# Patient Record
Sex: FEMALE | Race: WHITE | HISPANIC OR LATINO | Employment: UNEMPLOYED | URBAN - METROPOLITAN AREA
[De-identification: names, ages, dates, MRNs, and addresses within clinical notes are randomized per-mention and may not be internally consistent; named-entity substitution may affect disease eponyms.]

---

## 2017-02-28 ENCOUNTER — TRANSCRIBE ORDERS (OUTPATIENT)
Dept: LAB | Facility: HOSPITAL | Age: 52
End: 2017-02-28

## 2017-02-28 ENCOUNTER — APPOINTMENT (OUTPATIENT)
Dept: LAB | Facility: HOSPITAL | Age: 52
End: 2017-02-28
Attending: FAMILY MEDICINE

## 2017-02-28 DIAGNOSIS — R94.6 NONSPECIFIC ABNORMAL RESULTS OF THYROID FUNCTION STUDY: ICD-10-CM

## 2017-02-28 DIAGNOSIS — K59.00 UNSPECIFIED CONSTIPATION: ICD-10-CM

## 2017-02-28 DIAGNOSIS — R94.6 NONSPECIFIC ABNORMAL RESULTS OF THYROID FUNCTION STUDY: Primary | ICD-10-CM

## 2017-02-28 LAB
T4 FREE SERPL-MCNC: 0.98 NG/DL (ref 0.76–1.46)
TSH SERPL DL<=0.05 MIU/L-ACNC: 5.27 UIU/ML (ref 0.36–3.74)

## 2017-02-28 PROCEDURE — 84439 ASSAY OF FREE THYROXINE: CPT

## 2017-02-28 PROCEDURE — 36415 COLL VENOUS BLD VENIPUNCTURE: CPT

## 2017-02-28 PROCEDURE — 84443 ASSAY THYROID STIM HORMONE: CPT

## 2017-04-18 ENCOUNTER — ALLSCRIPTS OFFICE VISIT (OUTPATIENT)
Dept: OTHER | Facility: OTHER | Age: 52
End: 2017-04-18

## 2017-04-18 DIAGNOSIS — Z12.11 ENCOUNTER FOR SCREENING FOR MALIGNANT NEOPLASM OF COLON: ICD-10-CM

## 2017-04-19 ENCOUNTER — APPOINTMENT (OUTPATIENT)
Dept: LAB | Facility: CLINIC | Age: 52
End: 2017-04-19

## 2017-04-19 DIAGNOSIS — Z12.11 ENCOUNTER FOR SCREENING FOR MALIGNANT NEOPLASM OF COLON: ICD-10-CM

## 2017-04-19 LAB — HEMOCCULT STL QL IA: NEGATIVE

## 2017-04-19 PROCEDURE — G0328 FECAL BLOOD SCRN IMMUNOASSAY: HCPCS

## 2018-01-14 VITALS
HEIGHT: 61 IN | SYSTOLIC BLOOD PRESSURE: 96 MMHG | WEIGHT: 149.47 LBS | TEMPERATURE: 98.2 F | HEART RATE: 76 BPM | BODY MASS INDEX: 28.22 KG/M2 | DIASTOLIC BLOOD PRESSURE: 70 MMHG

## 2021-10-19 ENCOUNTER — OFFICE VISIT (OUTPATIENT)
Dept: OBGYN CLINIC | Facility: CLINIC | Age: 56
End: 2021-10-19

## 2021-10-19 VITALS
SYSTOLIC BLOOD PRESSURE: 115 MMHG | BODY MASS INDEX: 29.77 KG/M2 | HEART RATE: 66 BPM | DIASTOLIC BLOOD PRESSURE: 73 MMHG | WEIGHT: 155 LBS

## 2021-10-19 DIAGNOSIS — Z72.51 HIGH RISK HETEROSEXUAL BEHAVIOR: ICD-10-CM

## 2021-10-19 DIAGNOSIS — Z01.419 WOMEN'S ANNUAL ROUTINE GYNECOLOGICAL EXAMINATION: Primary | ICD-10-CM

## 2021-10-19 DIAGNOSIS — Z12.4 SCREENING FOR CERVICAL CANCER: ICD-10-CM

## 2021-10-19 DIAGNOSIS — Z11.3 SCREENING FOR STDS (SEXUALLY TRANSMITTED DISEASES): ICD-10-CM

## 2021-10-19 PROCEDURE — G0145 SCR C/V CYTO,THINLAYER,RESCR: HCPCS | Performed by: OBSTETRICS & GYNECOLOGY

## 2021-10-19 PROCEDURE — 87591 N.GONORRHOEAE DNA AMP PROB: CPT | Performed by: OBSTETRICS & GYNECOLOGY

## 2021-10-19 PROCEDURE — 87491 CHLMYD TRACH DNA AMP PROBE: CPT | Performed by: OBSTETRICS & GYNECOLOGY

## 2021-10-19 PROCEDURE — 99386 PREV VISIT NEW AGE 40-64: CPT | Performed by: OBSTETRICS & GYNECOLOGY

## 2021-10-19 PROCEDURE — G0476 HPV COMBO ASSAY CA SCREEN: HCPCS | Performed by: OBSTETRICS & GYNECOLOGY

## 2021-10-19 RX ORDER — DIPHENOXYLATE HYDROCHLORIDE AND ATROPINE SULFATE 2.5; .025 MG/1; MG/1
1 TABLET ORAL DAILY
COMMUNITY

## 2021-10-20 ENCOUNTER — HOSPITAL ENCOUNTER (OUTPATIENT)
Dept: RADIOLOGY | Age: 56
Discharge: HOME | End: 2021-10-20

## 2021-10-20 ENCOUNTER — APPOINTMENT (OUTPATIENT)
Dept: LAB | Age: 56
End: 2021-10-20

## 2021-10-20 VITALS — HEIGHT: 60 IN | WEIGHT: 155 LBS | BODY MASS INDEX: 30.43 KG/M2

## 2021-10-20 DIAGNOSIS — Z01.419 WOMEN'S ANNUAL ROUTINE GYNECOLOGICAL EXAMINATION: ICD-10-CM

## 2021-10-20 DIAGNOSIS — Z72.51 HIGH RISK HETEROSEXUAL BEHAVIOR: ICD-10-CM

## 2021-10-20 DIAGNOSIS — Z12.31 ENCOUNTER FOR SCREENING MAMMOGRAM FOR MALIGNANT NEOPLASM OF BREAST: ICD-10-CM

## 2021-10-20 LAB
HBV SURFACE AG SER QL: NORMAL
HCV AB SER QL: NORMAL

## 2021-10-20 PROCEDURE — 77067 SCR MAMMO BI INCL CAD: CPT

## 2021-10-20 PROCEDURE — 87340 HEPATITIS B SURFACE AG IA: CPT

## 2021-10-20 PROCEDURE — 87389 HIV-1 AG W/HIV-1&-2 AB AG IA: CPT

## 2021-10-20 PROCEDURE — 36415 COLL VENOUS BLD VENIPUNCTURE: CPT

## 2021-10-20 PROCEDURE — 77063 BREAST TOMOSYNTHESIS BI: CPT

## 2021-10-20 PROCEDURE — 86592 SYPHILIS TEST NON-TREP QUAL: CPT

## 2021-10-20 PROCEDURE — 86803 HEPATITIS C AB TEST: CPT

## 2021-10-21 LAB
C TRACH DNA SPEC QL NAA+PROBE: NEGATIVE
HIV 1+2 AB+HIV1 P24 AG SERPL QL IA: NORMAL
HPV HR 12 DNA CVX QL NAA+PROBE: NEGATIVE
HPV16 DNA CVX QL NAA+PROBE: NEGATIVE
HPV18 DNA CVX QL NAA+PROBE: NEGATIVE
N GONORRHOEA DNA SPEC QL NAA+PROBE: NEGATIVE
RPR SER QL: NORMAL

## 2021-10-25 ENCOUNTER — PATIENT OUTREACH (OUTPATIENT)
Dept: OBGYN CLINIC | Facility: CLINIC | Age: 56
End: 2021-10-25

## 2021-10-26 ENCOUNTER — OFFICE VISIT (OUTPATIENT)
Dept: INTERNAL MEDICINE CLINIC | Facility: CLINIC | Age: 56
End: 2021-10-26

## 2021-10-26 VITALS
OXYGEN SATURATION: 97 % | TEMPERATURE: 97.9 F | HEART RATE: 66 BPM | WEIGHT: 155.8 LBS | HEIGHT: 61 IN | BODY MASS INDEX: 29.42 KG/M2 | SYSTOLIC BLOOD PRESSURE: 105 MMHG | DIASTOLIC BLOOD PRESSURE: 66 MMHG

## 2021-10-26 DIAGNOSIS — Z23 NEED FOR INFLUENZA VACCINATION: ICD-10-CM

## 2021-10-26 DIAGNOSIS — Z76.89 ESTABLISHING CARE WITH NEW DOCTOR, ENCOUNTER FOR: Primary | ICD-10-CM

## 2021-10-26 PROBLEM — Z00.00 HEALTHCARE MAINTENANCE: Status: ACTIVE | Noted: 2021-10-26

## 2021-10-26 LAB
LAB AP GYN PRIMARY INTERPRETATION: NORMAL
Lab: NORMAL
PATH INTERP SPEC-IMP: NORMAL

## 2021-10-26 PROCEDURE — 90471 IMMUNIZATION ADMIN: CPT | Performed by: INTERNAL MEDICINE

## 2021-10-26 PROCEDURE — 90682 RIV4 VACC RECOMBINANT DNA IM: CPT | Performed by: INTERNAL MEDICINE

## 2021-10-26 PROCEDURE — 99204 OFFICE O/P NEW MOD 45 MIN: CPT | Performed by: INTERNAL MEDICINE

## 2021-10-26 RX ORDER — GLUCOSAM/CHON-MSM1/C/MANG/BOSW 750-644 MG
TABLET ORAL
COMMUNITY

## 2021-10-26 RX ORDER — LOVASTATIN 20 MG/1
1 TABLET ORAL
COMMUNITY
Start: 2017-04-18 | End: 2022-04-22 | Stop reason: ALTCHOICE

## 2021-10-26 RX ORDER — CYCLOBENZAPRINE HCL 5 MG
1 TABLET ORAL
COMMUNITY
Start: 2017-04-18 | End: 2022-07-27 | Stop reason: ALTCHOICE

## 2021-10-26 RX ORDER — AMITRIPTYLINE HYDROCHLORIDE 25 MG/1
TABLET, FILM COATED ORAL
COMMUNITY
End: 2022-07-27 | Stop reason: ALTCHOICE

## 2021-10-26 RX ORDER — ACETAMINOPHEN 500 MG
500 TABLET ORAL EVERY 6 HOURS PRN
COMMUNITY

## 2021-10-26 RX ORDER — AMITRIPTYLINE HYDROCHLORIDE 50 MG/1
1 TABLET, FILM COATED ORAL
COMMUNITY
Start: 2017-04-18 | End: 2022-07-27 | Stop reason: ALTCHOICE

## 2021-10-27 ENCOUNTER — APPOINTMENT (OUTPATIENT)
Dept: LAB | Facility: CLINIC | Age: 56
End: 2021-10-27

## 2021-10-27 ENCOUNTER — TELEPHONE (OUTPATIENT)
Dept: OBGYN CLINIC | Facility: CLINIC | Age: 56
End: 2021-10-27

## 2021-10-27 DIAGNOSIS — Z76.89 ESTABLISHING CARE WITH NEW DOCTOR, ENCOUNTER FOR: ICD-10-CM

## 2021-10-27 LAB
ALBUMIN SERPL BCP-MCNC: 3.6 G/DL (ref 3.5–5)
ALP SERPL-CCNC: 107 U/L (ref 46–116)
ALT SERPL W P-5'-P-CCNC: 22 U/L (ref 12–78)
ANION GAP SERPL CALCULATED.3IONS-SCNC: 4 MMOL/L (ref 4–13)
AST SERPL W P-5'-P-CCNC: 18 U/L (ref 5–45)
BILIRUB SERPL-MCNC: 1.15 MG/DL (ref 0.2–1)
BUN SERPL-MCNC: 11 MG/DL (ref 5–25)
CALCIUM SERPL-MCNC: 9.5 MG/DL (ref 8.3–10.1)
CHLORIDE SERPL-SCNC: 108 MMOL/L (ref 100–108)
CHOLEST SERPL-MCNC: 231 MG/DL (ref 50–200)
CO2 SERPL-SCNC: 27 MMOL/L (ref 21–32)
CREAT SERPL-MCNC: 0.6 MG/DL (ref 0.6–1.3)
EST. AVERAGE GLUCOSE BLD GHB EST-MCNC: 128 MG/DL
GFR SERPL CREATININE-BSD FRML MDRD: 102 ML/MIN/1.73SQ M
GLUCOSE P FAST SERPL-MCNC: 114 MG/DL (ref 65–99)
HBA1C MFR BLD: 6.1 %
HDLC SERPL-MCNC: 56 MG/DL
HEMOCCULT STL QL IA: NEGATIVE
LDLC SERPL CALC-MCNC: 150 MG/DL (ref 0–100)
POTASSIUM SERPL-SCNC: 3.5 MMOL/L (ref 3.5–5.3)
PROT SERPL-MCNC: 7.7 G/DL (ref 6.4–8.2)
SODIUM SERPL-SCNC: 139 MMOL/L (ref 136–145)
T4 FREE SERPL-MCNC: 1.01 NG/DL (ref 0.76–1.46)
TRIGL SERPL-MCNC: 124 MG/DL
TSH SERPL DL<=0.05 MIU/L-ACNC: 4.27 UIU/ML (ref 0.36–3.74)

## 2021-10-27 PROCEDURE — 36415 COLL VENOUS BLD VENIPUNCTURE: CPT

## 2021-10-27 PROCEDURE — 80053 COMPREHEN METABOLIC PANEL: CPT

## 2021-10-27 PROCEDURE — 83036 HEMOGLOBIN GLYCOSYLATED A1C: CPT

## 2021-10-27 PROCEDURE — G0328 FECAL BLOOD SCRN IMMUNOASSAY: HCPCS

## 2021-10-27 PROCEDURE — 84439 ASSAY OF FREE THYROXINE: CPT

## 2021-10-27 PROCEDURE — 84443 ASSAY THYROID STIM HORMONE: CPT

## 2021-10-27 PROCEDURE — 80061 LIPID PANEL: CPT

## 2021-11-08 DIAGNOSIS — E03.9 HYPOTHYROIDISM, UNSPECIFIED TYPE: Primary | ICD-10-CM

## 2021-11-08 RX ORDER — LEVOTHYROXINE SODIUM 0.05 MG/1
50 TABLET ORAL DAILY
Qty: 30 TABLET | Refills: 2 | Status: SHIPPED | OUTPATIENT
Start: 2021-11-08 | End: 2022-04-22 | Stop reason: SDUPTHER

## 2021-11-09 ENCOUNTER — OFFICE VISIT (OUTPATIENT)
Dept: OBGYN CLINIC | Facility: CLINIC | Age: 56
End: 2021-11-09

## 2021-11-09 VITALS
HEART RATE: 72 BPM | DIASTOLIC BLOOD PRESSURE: 75 MMHG | HEIGHT: 61 IN | WEIGHT: 124 LBS | BODY MASS INDEX: 23.41 KG/M2 | SYSTOLIC BLOOD PRESSURE: 108 MMHG

## 2021-11-09 DIAGNOSIS — N39.46 MIXED STRESS AND URGE URINARY INCONTINENCE: Primary | ICD-10-CM

## 2021-11-09 DIAGNOSIS — N95.2 VAGINAL ATROPHY: ICD-10-CM

## 2021-11-09 PROCEDURE — 99213 OFFICE O/P EST LOW 20 MIN: CPT | Performed by: OBSTETRICS & GYNECOLOGY

## 2021-11-09 RX ORDER — CONJUGATED ESTROGENS 0.62 MG/G
0.5 CREAM VAGINAL DAILY
Qty: 30 G | Refills: 2 | Status: SHIPPED | OUTPATIENT
Start: 2021-11-09

## 2021-11-10 ENCOUNTER — OFFICE VISIT (OUTPATIENT)
Dept: DENTISTRY | Facility: CLINIC | Age: 56
End: 2021-11-10

## 2021-11-10 VITALS — SYSTOLIC BLOOD PRESSURE: 116 MMHG | HEART RATE: 71 BPM | TEMPERATURE: 97.1 F | DIASTOLIC BLOOD PRESSURE: 77 MMHG

## 2021-11-10 DIAGNOSIS — K02.9 DENTAL CARIES INTO PULP: ICD-10-CM

## 2021-11-10 DIAGNOSIS — Z01.21 ENCOUNTER FOR DENTAL EXAMINATION AND CLEANING WITH ABNORMAL FINDINGS: Primary | ICD-10-CM

## 2021-11-10 PROCEDURE — D0140 LIMITED ORAL EVALUATION - PROBLEM FOCUSED: HCPCS | Performed by: DENTIST

## 2021-11-10 PROCEDURE — D0220 INTRAORAL - PERIAPICAL FIRST RADIOGRAPHIC IMAGE: HCPCS | Performed by: DENTIST

## 2021-11-10 PROCEDURE — D0230 INTRAORAL - PERIAPICAL EACH ADDITIONAL RADIOGRAPHIC IMAGE: HCPCS | Performed by: DENTIST

## 2021-11-30 ENCOUNTER — OFFICE VISIT (OUTPATIENT)
Dept: DENTISTRY | Facility: CLINIC | Age: 56
End: 2021-11-30

## 2021-11-30 VITALS — SYSTOLIC BLOOD PRESSURE: 113 MMHG | HEART RATE: 72 BPM | TEMPERATURE: 96.6 F | DIASTOLIC BLOOD PRESSURE: 68 MMHG

## 2021-11-30 DIAGNOSIS — K02.9 CARIES: Primary | ICD-10-CM

## 2021-11-30 DIAGNOSIS — Z01.20 VISIT FOR DENTAL EXAMINATION: ICD-10-CM

## 2021-11-30 PROCEDURE — D7210 EXTRACTION, ERUPTED TOOTH REQUIRING REMOVAL OF BONE AND/OR SECTIONING OF TOOTH, AND INCLUDING ELEVATION OF MUCOPERIOSTEAL FLAP IF INDICATED: HCPCS | Performed by: DENTIST

## 2021-11-30 PROCEDURE — D7140 EXTRACTION, ERUPTED TOOTH OR EXPOSED ROOT (ELEVATION AND/OR FORCEPS REMOVAL): HCPCS | Performed by: DENTIST

## 2021-12-04 ENCOUNTER — IMMUNIZATIONS (OUTPATIENT)
Dept: FAMILY MEDICINE CLINIC | Facility: HOSPITAL | Age: 56
End: 2021-12-04

## 2021-12-04 DIAGNOSIS — Z23 ENCOUNTER FOR IMMUNIZATION: Primary | ICD-10-CM

## 2021-12-04 PROCEDURE — 91300 COVID-19 PFIZER VACC 0.3 ML: CPT

## 2021-12-04 PROCEDURE — 0001A COVID-19 PFIZER VACC 0.3 ML: CPT

## 2022-01-25 ENCOUNTER — OFFICE VISIT (OUTPATIENT)
Dept: DENTISTRY | Facility: CLINIC | Age: 57
End: 2022-01-25

## 2022-01-25 VITALS — DIASTOLIC BLOOD PRESSURE: 77 MMHG | SYSTOLIC BLOOD PRESSURE: 118 MMHG | TEMPERATURE: 97.2 F | HEART RATE: 78 BPM

## 2022-01-25 DIAGNOSIS — Z01.20 VISIT FOR DENTAL EXAMINATION: Primary | ICD-10-CM

## 2022-01-25 DIAGNOSIS — K05.6 PERIODONTAL DISEASE: ICD-10-CM

## 2022-01-25 PROCEDURE — D0210 INTRAORAL - COMPLETE SERIES OF RADIOGRAPHIC IMAGES: HCPCS

## 2022-01-25 PROCEDURE — D0150 COMPREHENSIVE ORAL EVALUATION - NEW OR ESTABLISHED PATIENT: HCPCS

## 2022-01-25 NOTE — PROGRESS NOTES
Comprehensive Exam/FMX/ Perio chart  Reviewed hhx    CC: "I still have pain on the upper right where my tooth was pulled out " Pt is pointing to #3 area  FMX obtained    Perio charting completed  Perio findings: generalized bone loss present with 5-8mm pockets and bleeding with generalized recession  Mobility: #11      Dr Burnett Pi did exam    -Pt wears upper and lower partial dentures     -Pt has 8 remaining teeth in mouth  -Due to mobility on #11 which anchors the partial denture, it is recommended to have remaining upper 4 teeth extracted and have full upper denture made  Gave patient option on valplast partial denture for maxilla, but not ideal option as upper teeth have bone loss    -Pt also needs alveoplasty on #3 area at the time of extractions      -Recommended SRP on LL 1-3 teeth, LR 1-3 teeth to save lowers for now, may need a full denture in future, but no mobility is present on ginny teeth so she would like to try to save them for now  Wants lower partial denture smoothed/polished at a future appt as it rubs  Pt pays out of pocket for all tx, but is on the sliding fee program     Lana Zambrano RDH      NV: ext #5, 6, 10, 11 and alveoplasty 90 mins on OS day- 90 minutes minimum  Nv2: Will need SRP for LL, LR- 60 mins with hygiene     NV3: start with making full upper denture

## 2022-02-02 ENCOUNTER — OFFICE VISIT (OUTPATIENT)
Dept: DENTISTRY | Facility: CLINIC | Age: 57
End: 2022-02-02

## 2022-02-02 VITALS — SYSTOLIC BLOOD PRESSURE: 136 MMHG | HEART RATE: 98 BPM | DIASTOLIC BLOOD PRESSURE: 77 MMHG | TEMPERATURE: 97.8 F

## 2022-02-02 DIAGNOSIS — Z01.21 ENCOUNTER FOR DENTAL EXAMINATION AND CLEANING WITH ABNORMAL FINDINGS: Primary | ICD-10-CM

## 2022-02-02 PROCEDURE — D7140 EXTRACTION, ERUPTED TOOTH OR EXPOSED ROOT (ELEVATION AND/OR FORCEPS REMOVAL): HCPCS | Performed by: DENTIST

## 2022-02-02 PROCEDURE — D7321: HCPCS | Performed by: DENTIST

## 2022-02-02 RX ORDER — PENICILLIN V POTASSIUM 500 MG/1
500 TABLET ORAL EVERY 6 HOURS SCHEDULED
Qty: 28 TABLET | Refills: 0 | Status: SHIPPED | OUTPATIENT
Start: 2022-02-02 | End: 2022-02-09

## 2022-02-02 NOTE — PROGRESS NOTES
Oral Surgery    [facility] presents for Ext #5, 6, 10, 11 and alveoloplasty on site tooth# 14  [facility], patient denies any changes  Obtained a direct and personal consent  Risks and complications were explained  Pt agreed and consented  Consent scanned in doc center  Pre-Op BP WNL  Administered 4 carpule of 2 % Lidocaine w/ 1:100,000 epi via infiltration  ? Adequate anesthesia obtained, reflected gingiva, elevated, and extracted #5,610,11  Blade #15 was used to flap tooth #14 site  Periosteal elevator was used to expose the bone  Surgical bur 703 and round #8 bur was used to remove and smooth out the undercuts  Socket irrigated, and 4 0 chromic gut sutures placed       Upon dismissal, patient received POI, ice, gauze, and RX: penicillin     NV: follow up

## 2022-02-07 ENCOUNTER — OFFICE VISIT (OUTPATIENT)
Dept: DENTISTRY | Facility: CLINIC | Age: 57
End: 2022-02-07

## 2022-02-07 VITALS — TEMPERATURE: 97.1 F | SYSTOLIC BLOOD PRESSURE: 106 MMHG | DIASTOLIC BLOOD PRESSURE: 67 MMHG | HEART RATE: 77 BPM

## 2022-02-07 DIAGNOSIS — K05.6 PERIODONTAL DISEASE: Primary | ICD-10-CM

## 2022-02-07 PROCEDURE — D4342 PERIODONTAL SCALING AND ROOT PLANING - 1 TO 3 TEETH PER QUADRANT: HCPCS

## 2022-02-07 NOTE — PROGRESS NOTES
Reviewed hhx  ASA: II    Patient presents for SRP in quads :LR, LL    Anesthesia-  Topical gel benzocaine 20% was applied to tissue  1 Carp of Septocaine 4% with epi was given via short needle  Type of injection: ginny infitrations  Neg aspirations and no complications for all  Cavitron and hand instruments used  Bleeding:moderate to heavy  Supra/sub calculus was removed from tooth surfaces    OHI: reviewed with the patient the need to maintain proper oral hygiene regimen at home  Following scaling and root planing, the patient will continue on 3-4 month periodontal maintenance appointments if healing is sufficient  *Pt inquired about making a new lower partial denture, hygienist explained she must talk to the dentist about that  Pt understands  NV: perio re-eval 4-6 weeks  Pt scheduled 3/22/2022  NV2: Scheduled 5/31/22 with Dr Ford Hernandez to continue tx for FUD Severo Schneider, [facility]

## 2022-02-08 ENCOUNTER — OFFICE VISIT (OUTPATIENT)
Dept: OBGYN CLINIC | Facility: CLINIC | Age: 57
End: 2022-02-08

## 2022-02-08 VITALS
BODY MASS INDEX: 28.53 KG/M2 | HEART RATE: 70 BPM | WEIGHT: 151 LBS | RESPIRATION RATE: 16 BRPM | DIASTOLIC BLOOD PRESSURE: 70 MMHG | SYSTOLIC BLOOD PRESSURE: 136 MMHG

## 2022-02-08 DIAGNOSIS — N39.46 MIXED STRESS AND URGE URINARY INCONTINENCE: Primary | ICD-10-CM

## 2022-02-08 PROBLEM — R32 URINARY INCONTINENCE: Status: ACTIVE | Noted: 2022-02-08

## 2022-02-08 LAB
SL AMB  POCT GLUCOSE, UA: NEGATIVE
SL AMB LEUKOCYTE ESTERASE,UA: NEGATIVE
SL AMB POCT BILIRUBIN,UA: NEGATIVE
SL AMB POCT BLOOD,UA: NEGATIVE
SL AMB POCT CLARITY,UA: CLEAR
SL AMB POCT COLOR,UA: YELLOW
SL AMB POCT KETONES,UA: NEGATIVE
SL AMB POCT NITRITE,UA: NEGATIVE
SL AMB POCT PH,UA: 6.5
SL AMB POCT SPECIFIC GRAVITY,UA: 1
SL AMB POCT URINE PROTEIN: NEGATIVE
SL AMB POCT UROBILINOGEN: 0.2

## 2022-02-08 PROCEDURE — 99213 OFFICE O/P EST LOW 20 MIN: CPT | Performed by: OBSTETRICS & GYNECOLOGY

## 2022-02-08 PROCEDURE — 81002 URINALYSIS NONAUTO W/O SCOPE: CPT | Performed by: OBSTETRICS & GYNECOLOGY

## 2022-02-08 NOTE — PROGRESS NOTES
Follow Up Visit  Urinary Incontinence     Subjective:  Bakari Patel is a 61 y o   female who presents for follow up for mild, mixed stress and urge incontinence  Last seen by Dr Janae Garcia on 2021  Discussed conservative management with behavioral modifications (avoidance of triggers, timed voids, Kegel exercises, possible use of estrogen cream, etc )  Patient returns today for follow up  States her symptoms are much improved  She denies major episodes of incontinence throughout the day  Only occasionally with sneezing  States she is waling more, doing daily Kegel exercises, drinking more water, avoiding carbohydrates  She notes only a couple episodes of awaking at night to use the bathroom  She is happy with the improvement in her symptoms  Objective:  Vitals:    22 1322   BP: 136/70   Pulse: 70   Resp: 16       Physical Exam  Constitutional:       General: She is not in acute distress  Appearance: She is normal weight  She is not ill-appearing or diaphoretic  HENT:      Head: Normocephalic and atraumatic  Eyes:      Conjunctiva/sclera: Conjunctivae normal       Pupils: Pupils are equal, round, and reactive to light  Cardiovascular:      Rate and Rhythm: Normal rate  Pulmonary:      Effort: Pulmonary effort is normal  No respiratory distress  Genitourinary:     Comments: Deferred   Musculoskeletal:         General: Normal range of motion  Cervical back: Normal range of motion  Skin:     General: Skin is warm and dry  Neurological:      General: No focal deficit present  Mental Status: She is alert and oriented to person, place, and time  Mental status is at baseline  Psychiatric:         Mood and Affect: Mood normal          Behavior: Behavior normal          Thought Content:  Thought content normal          Assessment:  Bakari Patel is a 61 y o   female with mild, mixed urinary incontinence, symptoms much improved after behavioral modifications since last office visit  She would like to continue conservative management for now  She states she is happy with the improvement in symptoms       Plan:   - RTC 6 months for follow up, or sooner for worsening symptoms/concerns   - Continue current management   - Urine dip today negative     Carmen Terrazas MD   PGY-3, OBGYN  02/08/22

## 2022-03-10 ENCOUNTER — OFFICE VISIT (OUTPATIENT)
Dept: DENTISTRY | Facility: CLINIC | Age: 57
End: 2022-03-10

## 2022-03-10 VITALS — HEART RATE: 77 BPM | TEMPERATURE: 97.3 F | DIASTOLIC BLOOD PRESSURE: 73 MMHG | SYSTOLIC BLOOD PRESSURE: 117 MMHG

## 2022-03-10 DIAGNOSIS — K05.6 PERIODONTAL DISEASE: Primary | ICD-10-CM

## 2022-03-10 PROCEDURE — D0171 RE-EVALUATION - POST-OPERATIVE OFFICE VISIT: HCPCS

## 2022-03-10 NOTE — PROGRESS NOTES
Periodontal Re-evaluation  Reviewed hhx    Perio charting completed  Perio findings: ideal healing has occurred  Pockets are between 2-3 mm with localized bleeding and generalized recession and bone loss  Following recommendations:  Patient has ideal healing and should remain on 3-4 month periodontal maintenance appointments     -Pt asked to move up her appt from 5/31/22 for fabrication of dentures sooner because her daughter is graduating high school  Confirmed with Dr Selvin Gu the soonest she can come in would be beginning of April 2022 due to having extractions done beginning of February 2022 as long as she is comfortable seeing any resident      -Pt states she has an existing lower partial denture but would like a new one  Added partial denture to tx plan today  NV: fabrication of full upper denture and lower partial denture  NV2: Pt is to remain on 3 month periodontal maintenance appts with hygiene         Cecilia Toribio, [facility]

## 2022-04-04 ENCOUNTER — OFFICE VISIT (OUTPATIENT)
Dept: DENTISTRY | Facility: CLINIC | Age: 57
End: 2022-04-04

## 2022-04-04 VITALS — SYSTOLIC BLOOD PRESSURE: 110 MMHG | HEART RATE: 71 BPM | DIASTOLIC BLOOD PRESSURE: 68 MMHG | TEMPERATURE: 96.8 F

## 2022-04-04 DIAGNOSIS — Z01.21 ENCOUNTER FOR DENTAL EXAMINATION AND CLEANING WITH ABNORMAL FINDINGS: Primary | ICD-10-CM

## 2022-04-04 PROCEDURE — WIS5000 PRELIMINARY IMPRESSIONS: Performed by: DENTIST

## 2022-04-04 NOTE — PROGRESS NOTES
Removable    Pt presents for a denture visit and gave verbal consent for treatment:    Reviewed health history - Pt is ASA 2    Treatment consents signed: Yes    Perio: Non applicable    Pain Scale: 0    Caries Assessment: none    Oral Hygiene instruction reviewed    Annual evaluation of soft tissue recommended    Teeth 21, 22, 27 and 28 are present so routine prophy visits recommended to the pt  Explained denture making process to patient, including the necessary number of visits and timeframe to make denture  Reviewed denture expectations, adjustment period, and hygiene  Upper and lower Silgimix impressions made using stock trays  Sent it to the lab for fabricating of custom trays     NV: Border molding and final impression

## 2022-04-22 ENCOUNTER — OFFICE VISIT (OUTPATIENT)
Dept: INTERNAL MEDICINE CLINIC | Facility: CLINIC | Age: 57
End: 2022-04-22

## 2022-04-22 ENCOUNTER — APPOINTMENT (OUTPATIENT)
Dept: LAB | Facility: CLINIC | Age: 57
End: 2022-04-22

## 2022-04-22 VITALS
HEART RATE: 75 BPM | BODY MASS INDEX: 29.02 KG/M2 | OXYGEN SATURATION: 97 % | SYSTOLIC BLOOD PRESSURE: 97 MMHG | TEMPERATURE: 97.6 F | DIASTOLIC BLOOD PRESSURE: 64 MMHG | WEIGHT: 153.6 LBS

## 2022-04-22 DIAGNOSIS — E78.5 HYPERLIPIDEMIA, UNSPECIFIED HYPERLIPIDEMIA TYPE: ICD-10-CM

## 2022-04-22 DIAGNOSIS — E03.9 HYPOTHYROIDISM, UNSPECIFIED TYPE: Primary | ICD-10-CM

## 2022-04-22 DIAGNOSIS — M79.89 BILATERAL HAND SWELLING: ICD-10-CM

## 2022-04-22 PROBLEM — E03.8 SUBCLINICAL HYPOTHYROIDISM: Status: ACTIVE | Noted: 2022-04-22

## 2022-04-22 LAB
CRP SERPL QL: 6 MG/L
ERYTHROCYTE [SEDIMENTATION RATE] IN BLOOD: 33 MM/HOUR (ref 0–29)

## 2022-04-22 PROCEDURE — 86038 ANTINUCLEAR ANTIBODIES: CPT

## 2022-04-22 PROCEDURE — 99213 OFFICE O/P EST LOW 20 MIN: CPT | Performed by: INTERNAL MEDICINE

## 2022-04-22 PROCEDURE — 86200 CCP ANTIBODY: CPT

## 2022-04-22 PROCEDURE — 36415 COLL VENOUS BLD VENIPUNCTURE: CPT

## 2022-04-22 PROCEDURE — 85652 RBC SED RATE AUTOMATED: CPT

## 2022-04-22 PROCEDURE — 86430 RHEUMATOID FACTOR TEST QUAL: CPT

## 2022-04-22 PROCEDURE — 86140 C-REACTIVE PROTEIN: CPT

## 2022-04-22 RX ORDER — LEVOTHYROXINE SODIUM 0.05 MG/1
50 TABLET ORAL DAILY
Qty: 30 TABLET | Refills: 2 | Status: SHIPPED | OUTPATIENT
Start: 2022-04-22

## 2022-04-22 RX ORDER — ATORVASTATIN CALCIUM 10 MG/1
10 TABLET, FILM COATED ORAL DAILY
Qty: 30 TABLET | Refills: 2 | Status: SHIPPED | OUTPATIENT
Start: 2022-04-22

## 2022-04-22 NOTE — PATIENT INSTRUCTIONS
Please  your medications from the [address] at [facility]  Please get the blood work done as soon as you can  We will call you with the results

## 2022-04-22 NOTE — PROGRESS NOTES
[facility]  INTERNAL MEDICINE OFFICE VISIT     PATIENT INFORMATION     Sridhar Harrison   61 y o  female   MRN: [id number]    ASSESSMENT/PLAN     Diagnoses and all orders for this visit:    Hypothyroidism, unspecified type  -     levothyroxine (Euthyrox) 50 mcg tablet; Take 1 tablet (50 mcg total) by mouth daily    Hyperlipidemia, unspecified hyperlipidemia type  -     atorvastatin (LIPITOR) 10 mg tablet; Take 1 tablet (10 mg total) by mouth daily    Bilateral hand swelling  -     Concern for possible RA given morning stiffness and mild swelling of bilateral hands that lasts 30-45 minutes and has been ongoing for about 3 months  Associated with bilateral L>R knee stiffness (not only in the morning) without any other symptoms   -     Antinuclear Antibodies (KIESHA), IFA; Future  -     Rheumatoid Factor; Future  -     Sedimentation rate, automated; Future  -     C-reactive protein; Future  -     Cyclic citrul peptide antibody, IgG; Future      Schedule a follow-up appointment in 3-6 months  HEALTH MAINTENANCE     Immunization History   Administered Date(s) Administered    COVID-19 PFIZER VACCINE 0 3 ML IM 12/04/2021    Influenza, recombinant, quadrivalent,injectable, preservative free 10/26/2021    Influenza, seasonal, injectable 10/15/2012, 11/18/2013, 10/06/2014, 10/18/2016    Tdap 10/15/2012     CHIEF COMPLAINT     Chief Complaint   Patient presents with    Follow-up      HISTORY OF PRESENT ILLNESS     56F with PMH of hyperlipidemia, subclinical hypothyroidism presents for 6 month follow up  She is complaining of stiffness in her hands in the morning and headaches  She has stiffness in both hands in the morning for about the last 3 months  Stiffness lasts for about 30-45 minutes and is significant, but will self-resolve  Tylenol or Ibuprofen help when the pain is very strong  She also has L>R knee stiffness during the day   She does sometimes notice that her hands get swollen in the morning but her knees do not get swollen  Denies fevers, chills, lightheadedness, dizziness, chest pain, dyspnea, nausea, vomiting, abdominal pain  Regarding her headache, this has been occurring intermittently for a couple years  She notices that it has been improving in both severity and frequency over the last few months  However, she still does have occasional headaches  They used to occur everyday, now 2-3 days a week  They last for a couple hours each time (used to be all day)  Headaches are left sided frontal-parietal region and sometimes periorbital  She denies prodromal symptoms (flashing lights, tunnel vision, changes in hearing)  She denies photophobia, associated n/v, lacrimation  She does admit to some phonophobia  She does not usually take Tylenol and Ibuprofen when she has a headache, but may take 2 Tylenol 500mg or 2 Ibuprofen 200mg when the headache is very strong (she will need this on average once a week)  Discussed levothyroxine use with patient  She is unable to afford the medication at her pharmacy  She was also placed on lovastatin a few years ago but has not been taking for several years due to cost concerns  Her lipid panel in 10/2021 showed persistent hyperlipidemia  I discussed [employer] $4 program with patient and informed her that levothyroxine and lipitor are both covered  REVIEW OF SYSTEMS     Review of Systems - see HPI    OBJECTIVE     Vitals:    04/22/22 0800   BP: 97/64   BP Location: Right arm   Patient Position: Sitting   Cuff Size: Standard   Pulse: 75   Temp: 97 6 °F (36 4 °C)   TempSrc: Temporal   SpO2: 97%   Weight: 69 7 kg (153 lb 9 6 oz)     Physical Exam  Vitals reviewed  Constitutional:       General: She is not in acute distress  Appearance: Normal appearance  She is well-developed  HENT:      Head: Normocephalic and atraumatic  Cardiovascular:      Rate and Rhythm: Normal rate and regular rhythm        Heart sounds: S1 normal and S2 normal  No murmur heard  Pulmonary:      Effort: Pulmonary effort is normal  No respiratory distress  Breath sounds: Normal breath sounds  Musculoskeletal:      Right lower leg: No edema  Left lower leg: No edema  Comments: Bilateral 2-4 DIP enlargement   Skin:     General: Skin is warm and dry  Neurological:      Mental Status: She is alert and oriented to person, place, and time  Psychiatric:         Mood and Affect: Mood normal          Thought Content:  Thought content normal         CURRENT MEDICATIONS     Current Outpatient Medications:     acetaminophen (TYLENOL) 500 mg tablet, Take 500 mg by mouth every 6 (six) hours as needed, Disp: , Rfl:     Calcium Carb-Cholecalciferol (Caltrate 600+D) 600-800 MG-UNIT TABS, Take by mouth  , Disp: , Rfl:     conjugated estrogens (Premarin) vaginal cream, Insert 0 5 g into the vagina daily, Disp: 30 g, Rfl: 2    Glucosamine-Chondroitin (Osteo Bi-Flex Regular Strength) 250-200 MG TABS, Take by mouth  , Disp: , Rfl:     multivitamin (THERAGRAN) TABS, Take 1 tablet by mouth daily, Disp: , Rfl:     amitriptyline (ELAVIL) 25 mg tablet, Take by mouth (Patient not taking: Reported on 10/26/2021), Disp: , Rfl:     amitriptyline (ELAVIL) 50 mg tablet, Take 1 tablet by mouth (Patient not taking: Reported on 10/26/2021), Disp: , Rfl:     cyclobenzaprine (FLEXERIL) 5 mg tablet, Take 1 tablet by mouth (Patient not taking: Reported on 10/26/2021), Disp: , Rfl:     levothyroxine (Euthyrox) 50 mcg tablet, Take 1 tablet (50 mcg total) by mouth daily (Patient not taking: Reported on 2021 ), Disp: 30 tablet, Rfl: 2    lovastatin (MEVACOR) 20 mg tablet, Take 1 tablet by mouth (Patient not taking: Reported on 10/26/2021), Disp: , Rfl:     PAST MEDICAL & SURGICAL HISTORY     Past Medical History:   Diagnosis Date    Periodontal disease 2022     Past Surgical History:   Procedure Laterality Date     SECTION      TUBAL LIGATION SOCIAL & FAMILY HISTORY     Social History     Socioeconomic History    Marital status: Single     Spouse name: Not on file    Number of children: Not on file    Years of education: Not on file    Highest education level: Not on file   Occupational History    Not on file   Tobacco Use    Smoking status: Never Smoker    Smokeless tobacco: Never Used   Vaping Use    Vaping Use: Never used   Substance and Sexual Activity    Alcohol use: Yes     Comment: social    Drug use: Never    Sexual activity: Yes     Partners: Male     Birth control/protection: Post-menopausal   Other Topics Concern    Not on file   Social History Narrative    Not on file     Social Determinants of Health     Financial Resource Strain: Medium Risk    Difficulty of Paying Living Expenses: Somewhat hard   Food Insecurity: No Food Insecurity    Worried About Running Out of Food in the Last Year: Never true    Jimenez of Food in the Last Year: Never true   Transportation Needs: No Transportation Needs    Lack of Transportation (Medical): No    Lack of Transportation (Non-Medical):  No   Physical Activity: Sufficiently Active    Days of Exercise per Week: 7 days    Minutes of Exercise per Session: 30 min   Stress: No Stress Concern Present    Feeling of Stress : Not at all   Social Connections: Socially Isolated    Frequency of Communication with Friends and Family: Twice a week    Frequency of Social Gatherings with Friends and Family: Never    Attends Rastafari Services: Never    Active Member of Clubs or Organizations: No    Attends Club or Organization Meetings: Never    Marital Status: Never    Intimate Partner Violence: Not At Risk    Fear of Current or Ex-Partner: No    Emotionally Abused: No    Physically Abused: No    Sexually Abused: No   Housing Stability: Low Risk     Unable to Pay for Housing in the Last Year: No    Number of Places Lived in the Last Year: 1    Unstable Housing in the Last Year: No     Social History     Substance and Sexual Activity   Alcohol Use Yes    Comment: social     Social History     Substance and Sexual Activity   Drug Use Never     Social History     Tobacco Use   Smoking Status Never Smoker   Smokeless Tobacco Never Used     Family History   Problem Relation Age of Onset    Hypertension Mother     Diabetes Father                ==  Samuel Balderas DO  PGY-1  [facility] [address]  [facility]   [facility] [address] , [address] [address] [facility], [address] [facility]  Office: [phone]  Fax: [phone]

## 2022-04-25 LAB
ANA TITR SER IF: NEGATIVE {TITER}
RHEUMATOID FACT SER QL LA: NEGATIVE

## 2022-04-26 LAB — CCP AB SER IA-ACNC: >340

## 2022-04-29 DIAGNOSIS — M06.041 RHEUMATOID ARTHRITIS INVOLVING BOTH HANDS WITH NEGATIVE RHEUMATOID FACTOR (HCC): Primary | ICD-10-CM

## 2022-04-29 DIAGNOSIS — M06.042 RHEUMATOID ARTHRITIS INVOLVING BOTH HANDS WITH NEGATIVE RHEUMATOID FACTOR (HCC): Primary | ICD-10-CM

## 2022-05-05 ENCOUNTER — OFFICE VISIT (OUTPATIENT)
Dept: DENTISTRY | Facility: CLINIC | Age: 57
End: 2022-05-05

## 2022-05-05 VITALS — SYSTOLIC BLOOD PRESSURE: 105 MMHG | DIASTOLIC BLOOD PRESSURE: 70 MMHG | HEART RATE: 72 BPM | TEMPERATURE: 97.5 F

## 2022-05-05 DIAGNOSIS — K08.109 EDENTULOUS: Primary | ICD-10-CM

## 2022-05-05 PROCEDURE — WIS5001 FINAL IMPRESSIONS DENTURE: Performed by: DENTIST

## 2022-05-05 NOTE — PROGRESS NOTES
Final Impression  Pt presents for maxillary complete denture and mandibular partial denture final impression  Pt initially planned for mandibular cast metal partial  Considering perio status, recommend acrylic mandibular partial denture per Dr Oh Lee     Maxillary custom tray modified to properly fit arch and extend short of vestibule  Palatal stop made with bite registration impression  Border molding performed with heavy body around vestibules then palatal seal  Light body wash performed  Contours, vestibule and intaglio captured  Custom tray fabricated prior to visit not considered adequate  Mandibular stock tray used with alginate        NV: Upper and lower wax rims

## 2022-05-24 ENCOUNTER — OFFICE VISIT (OUTPATIENT)
Dept: DENTISTRY | Facility: CLINIC | Age: 57
End: 2022-05-24

## 2022-05-24 VITALS — SYSTOLIC BLOOD PRESSURE: 112 MMHG | DIASTOLIC BLOOD PRESSURE: 72 MMHG | HEART RATE: 82 BPM | TEMPERATURE: 97.8 F

## 2022-05-24 DIAGNOSIS — K08.109 EDENTULOUS: Primary | ICD-10-CM

## 2022-05-24 PROCEDURE — WIS5002 BITE RIMS: Performed by: DENTIST

## 2022-05-24 NOTE — PROGRESS NOTES
Wax Rim    Pt presents for Wax rim  Denture seated but patient noted mild discomfort  Verified internal and removed any interferences that prevent complete and comfortable seating  Verified and adjusted maxillary plane occlusion to be even and balanced and comply with centric relation  Due to lip length and height of #21, 22, 27, and 28 denture is short of ideal lip length  Verified interpupillary line and ala-tragus line  Adjusted mandibular rim to be even with maxillary rim and leveled to height of Centric relation  Marked midline, and canine positioning  Pt selected shade B2  Discussed expectations  Pt understood and left in good spirits      NV: Teeth Try-in

## 2022-06-13 ENCOUNTER — OFFICE VISIT (OUTPATIENT)
Dept: DENTISTRY | Facility: CLINIC | Age: 57
End: 2022-06-13

## 2022-06-13 VITALS — HEART RATE: 88 BPM | DIASTOLIC BLOOD PRESSURE: 72 MMHG | TEMPERATURE: 92.8 F | SYSTOLIC BLOOD PRESSURE: 107 MMHG

## 2022-06-13 DIAGNOSIS — Z01.21 ENCOUNTER FOR DENTAL EXAMINATION AND CLEANING WITH ABNORMAL FINDINGS: Primary | ICD-10-CM

## 2022-06-13 PROCEDURE — D4910 PERIODONTAL MAINTENANCE: HCPCS

## 2022-06-13 NOTE — PROGRESS NOTES
Perio Maint    Dental procedures in this visit     - PERIODONTAL MAINTENANCE (Completed)     Service provider: Marcus Calero, [facility], [facility]     Billing provider: Adelaida Brink DDS     Rev med hist with Pt  In Epic  ASA-II  Pts CC= " I am excited to get my teeth as it has been a long time "  Pt is scheduled next week for a try in  If all looks and fits well,the next appt should be deliver  Method Used:  · Prophy Method Used: Ultrasonic Scaling  · Hand Scaling  · Polished  · Flossed    Radiographs Taken:  · None    Intra/Extra Oral Cancer Screening:  · Within normal limits      Oral Hygiene:  · Fair    Plaque:  · Generalized  · Moderate    Calculus:  · Localized  · Light  · Posteriors    Bleeding:  · Bleeding on probin 50%  · Localized  · Moderate    Stain:  · None    Periodontal Charting:  · Full probing-Pocket depth has increased in localized areas with generalized recession  Total pocket depth for #22-MB/ML=10mm and direct F=7mm with +2 mobility  Periodontal Classification:  · Localized  · Severe  · Periodontal Disease      Nutritional Counseling:  · N/A    OHI: Disp and demonstrated proxy brush  Reviewed the benefits of a waterpik  Xenia Aguilar    No orders of the defined types were placed in this encounter        NV: Teeth Try-In  NV2:3 mth perio maint /Re-Eval  #22

## 2022-06-20 ENCOUNTER — OFFICE VISIT (OUTPATIENT)
Dept: DENTISTRY | Facility: CLINIC | Age: 57
End: 2022-06-20

## 2022-06-20 VITALS — DIASTOLIC BLOOD PRESSURE: 75 MMHG | HEART RATE: 85 BPM | TEMPERATURE: 96.9 F | SYSTOLIC BLOOD PRESSURE: 111 MMHG

## 2022-06-20 DIAGNOSIS — K08.109 EDENTULOUS: Primary | ICD-10-CM

## 2022-06-20 PROCEDURE — WIS5003 WAX TRY-IN: Performed by: DENTIST

## 2022-06-20 NOTE — PROGRESS NOTES
Teeth Try-In     Pt presents for Teeth try-in (Max comp AND Babar partial)  Denture seated and patient noted it was comfortable  Pt verified satisfaction with shade, shape, and positioning of teeth  Verified occlusion of teeth  Even occlusal contacts throughout denture  Discussed with patient that metal clasp will be present around #50 and 28 and acrylic clasps around #68 and 27  Pt verbalized understanding  Discussed expectations, and alternatives (implants) if patient is still not satisfied with long term fit  Pt understood and left in good spirits  Dentures submitted to Arely patricia Gustafson       NV: Delivery

## 2022-07-19 ENCOUNTER — OFFICE VISIT (OUTPATIENT)
Dept: DENTISTRY | Facility: CLINIC | Age: 57
End: 2022-07-19

## 2022-07-19 VITALS — DIASTOLIC BLOOD PRESSURE: 65 MMHG | TEMPERATURE: 97.6 F | SYSTOLIC BLOOD PRESSURE: 114 MMHG | HEART RATE: 74 BPM

## 2022-07-19 DIAGNOSIS — K08.109 TEETH MISSING: Primary | ICD-10-CM

## 2022-07-19 PROCEDURE — D5110 COMPLETE DENTURE - MAXILLARY: HCPCS | Performed by: DENTIST

## 2022-07-19 PROCEDURE — D5212 MANDIBULAR PARTIAL DENTURE - RESIN BASE (INCLUDING, RETENTIVE/CLASPING MATERIALS, RESTS, AND TEETH): HCPCS | Performed by: DENTIST

## 2022-07-19 NOTE — PROGRESS NOTES
Pt presents for denture delivery, max complete, lower rpd  Denture picked up and verified prior to appt  Maxillary complete denture and mandibular removable denture seated, patient had no sore spots  Verified internal and removed any interferences that prevent complete and comfortable seating  Verified occlusion to be even and balanced  Pt stated she liked the aesthetics, feel, and comfort  Advised patient to return if he feels any pain or discomfort when wearing dentures for adjustments  Provided home care instructions, discussed expectations  Pt was given denture care kit  Pt understood and left in good spirits      NV: Adjustment

## 2022-07-26 ENCOUNTER — OFFICE VISIT (OUTPATIENT)
Dept: DENTISTRY | Facility: CLINIC | Age: 57
End: 2022-07-26

## 2022-07-26 DIAGNOSIS — Z01.20 ENCOUNTER FOR DENTAL EXAMINATION: Primary | ICD-10-CM

## 2022-07-26 NOTE — PROGRESS NOTES
Wash Kinney came with c/o : I need denture adjustment  Med hx rvd: no change  ASA II  Pian level: 3/10   o/e:   Pt recently got upper cd and lower partial dent  Pt c/o discomfort lower rt and left buccal vestibule, sore spots removed, pt comfortable with fit   Pt comfortable with Upper CD , no complaints  Pt asked to return if further adjustments are needed     nv: denture adj

## 2022-07-27 ENCOUNTER — OFFICE VISIT (OUTPATIENT)
Dept: INTERNAL MEDICINE CLINIC | Facility: CLINIC | Age: 57
End: 2022-07-27

## 2022-07-27 VITALS
DIASTOLIC BLOOD PRESSURE: 68 MMHG | WEIGHT: 150.8 LBS | TEMPERATURE: 97.3 F | SYSTOLIC BLOOD PRESSURE: 103 MMHG | OXYGEN SATURATION: 97 % | BODY MASS INDEX: 28.49 KG/M2 | HEART RATE: 79 BPM

## 2022-07-27 DIAGNOSIS — Z78.9 NEED FOR COMMUNITY RESOURCE: ICD-10-CM

## 2022-07-27 DIAGNOSIS — Z00.00 HEALTHCARE MAINTENANCE: ICD-10-CM

## 2022-07-27 DIAGNOSIS — M79.89 BILATERAL HAND SWELLING: ICD-10-CM

## 2022-07-27 DIAGNOSIS — E03.8 SUBCLINICAL HYPOTHYROIDISM: Primary | ICD-10-CM

## 2022-07-27 DIAGNOSIS — R20.2 PARESTHESIA OF LEFT ARM: ICD-10-CM

## 2022-07-27 PROCEDURE — 99214 OFFICE O/P EST MOD 30 MIN: CPT | Performed by: INTERNAL MEDICINE

## 2022-07-27 NOTE — PROGRESS NOTES
[facility]  INTERNAL MEDICINE OFFICE VISIT     PATIENT INFORMATION     Nanda Postal   61 y o  female   MRN: [id number]    ASSESSMENT/PLAN     Diagnoses and all orders for this visit:    Subclinical hypothyroidism  -     Will plan on ordering TSH at annual physical if patient can obtain financial assistance  -     Continue levothyroxine 50mcg daily    Healthcare maintenance  -     Was planning on ordering CBC, BMP, A1c, lipid panel, but due to patient not having insurance will hold off until annual physical   -     Instructed patient to stop atorvastatin for now as it may be contributing to her LUE pain  -     Will contact financial counselor to provide further assistance    Paresthesia of left arm        -     Likely a combination of degenerative cervical spine disease as well as RA that has yet to be treated  Encouraged patient to attend her Rheumatology appointment on 9/27         -     Was planning on ordering XR C spine, but due to patient not having insurance will hold off pending Rheumatology evaluation  Bilateral hand swelling  -     Was planning on ordering XR bilateral hands, anti-SSA/SSB Ab, Lyme antibody for persistent but improving hand stiffness/swelling, but due to patient not having insurance will hold off pending Rheumatology evaluation  Schedule a follow-up appointment in 3 months for annual physical     HEALTH MAINTENANCE     Immunization History   Administered Date(s) Administered    COVID-19 PFIZER VACCINE 0 3 ML IM 12/04/2021    Influenza, recombinant, quadrivalent,injectable, preservative free 10/26/2021    Influenza, seasonal, injectable 10/15/2012, 11/18/2013, 10/06/2014, 10/18/2016    Tdap 10/15/2012     CHIEF COMPLAINT     Chief Complaint   Patient presents with    Follow-up      HISTORY OF PRESENT ILLNESS     56F with PMH of subclinical hypothyroidism and HLD presents for follow up after starting levothyroxine and lipitor  Patient reports she is feeling better with the new medications  Patient has not brooke taking amitriptyline, calcium carb cholecarciferol  She reports compliance with the rest of her medications  After starting her levothyroxine, patient has noted improvement in mild constipation, cold intolerance, fatigue  Patient is still reporting some bilateral knee stiffness similar to her prior visit, albeit somewhat improved  She had labs done after our last visit that suggested possible RA  She has an appointment scheduled with Rheumatology on 9/27  Today she does complain of some LUE paresthesias for about 2 months and its severity has not changed since onset  It primarily happens during the evening and is unable to sleep on her left side because of the sensation (can sleep well otherwise)  Other than exacerbation when laying left lateral recumbent, patient reports no other positional exacerbating factors  Tylenol does help the pain a little  She denies any trauma to her neck or LUE around when this started  The only injury she notes is a L wrist fracture 10 years ago  She has some pain when abducting her LUE above the level of her shoulder, otherwise no limits to her daily function  She noticed this pain/paresthesias prior to starting her lipitor but it has gotten worse  REVIEW OF SYSTEMS     Review of Systems - 12 point ROS performed and negative unless stated above  OBJECTIVE     Vitals:    07/27/22 0758   BP: 103/68   BP Location: Left arm   Patient Position: Sitting   Cuff Size: Standard   Pulse: 79   Temp: (!) 97 3 °F (36 3 °C)   TempSrc: Temporal   SpO2: 97%   Weight: 68 4 kg (150 lb 12 8 oz)     Physical Exam  Vitals and nursing note reviewed  Constitutional:       General: She is not in acute distress  Appearance: Normal appearance  She is well-developed  HENT:      Head: Normocephalic and atraumatic     Eyes:      Conjunctiva/sclera: Conjunctivae normal    Cardiovascular:      Rate and Rhythm: Normal rate and regular rhythm  Heart sounds: S1 normal and S2 normal  No murmur heard  Pulmonary:      Effort: Pulmonary effort is normal  No respiratory distress  Breath sounds: Normal breath sounds  Abdominal:      General: Bowel sounds are normal  There is no distension  Palpations: Abdomen is soft  Tenderness: There is no abdominal tenderness  Musculoskeletal:      Cervical back: Neck supple  Right lower leg: No edema  Left lower leg: No edema  Comments: Tinnel positive   Skin:     General: Skin is warm and dry  Neurological:      Mental Status: She is alert and oriented to person, place, and time  Cranial Nerves: Cranial nerves are intact  Sensory: Sensation is intact  Comments: 4/5 strength in LUE shoulder abduction only, 5/5 in LUE bicep flexion/extension   Psychiatric:         Mood and Affect: Mood normal          Thought Content:  Thought content normal        CURRENT MEDICATIONS     Current Outpatient Medications:     acetaminophen (TYLENOL) 500 mg tablet, Take 500 mg by mouth every 6 (six) hours as needed, Disp: , Rfl:     atorvastatin (LIPITOR) 10 mg tablet, Take 1 tablet (10 mg total) by mouth daily, Disp: 30 tablet, Rfl: 2    conjugated estrogens (Premarin) vaginal cream, Insert 0 5 g into the vagina daily, Disp: 30 g, Rfl: 2    Glucosamine-Chondroitin (Osteo Bi-Flex Regular Strength) 250-200 MG TABS, Take by mouth  , Disp: , Rfl:     levothyroxine (Euthyrox) 50 mcg tablet, Take 1 tablet (50 mcg total) by mouth daily, Disp: 30 tablet, Rfl: 2    multivitamin (THERAGRAN) TABS, Take 1 tablet by mouth daily, Disp: , Rfl:     PAST MEDICAL & SURGICAL HISTORY     Past Medical History:   Diagnosis Date    Periodontal disease 2022     Past Surgical History:   Procedure Laterality Date     SECTION      TUBAL LIGATION       SOCIAL & FAMILY HISTORY     Social History     Socioeconomic History    Marital status: Single     Spouse name: Not on file    Number of children: Not on file    Years of education: Not on file    Highest education level: Not on file   Occupational History    Not on file   Tobacco Use    Smoking status: Never Smoker    Smokeless tobacco: Never Used   Vaping Use    Vaping Use: Never used   Substance and Sexual Activity    Alcohol use: Yes     Comment: social    Drug use: Never    Sexual activity: Yes     Partners: Male     Birth control/protection: Post-menopausal   Other Topics Concern    Not on file   Social History Narrative    Not on file     Social Determinants of Health     Financial Resource Strain: Medium Risk    Difficulty of Paying Living Expenses: Somewhat hard   Food Insecurity: No Food Insecurity    Worried About Running Out of Food in the Last Year: Never true    Jimenez of Food in the Last Year: Never true   Transportation Needs: No Transportation Needs    Lack of Transportation (Medical): No    Lack of Transportation (Non-Medical):  No   Physical Activity: Sufficiently Active    Days of Exercise per Week: 7 days    Minutes of Exercise per Session: 30 min   Stress: No Stress Concern Present    Feeling of Stress : Not at all   Social Connections: Socially Isolated    Frequency of Communication with Friends and Family: Twice a week    Frequency of Social Gatherings with Friends and Family: Never    Attends [employer] Services: Never    Active Member of Clubs or Organizations: No    Attends Club or Organization Meetings: Never    Marital Status: Never    Intimate Partner Violence: Not At Risk    Fear of Current or Ex-Partner: No    Emotionally Abused: No    Physically Abused: No    Sexually Abused: No   Housing Stability: Low Risk     Unable to Pay for Housing in the Last Year: No    Number of Places Lived in the Last Year: 1    Unstable Housing in the Last Year: No     Social History     Substance and Sexual Activity   Alcohol Use Yes    Comment: social     Substance and Sexual Activity   Alcohol Use Yes    Comment: social        Substance and Sexual Activity   Drug Use Never     Social History     Tobacco Use   Smoking Status Never Smoker   Smokeless Tobacco Never Used     Family History   Problem Relation Age of Onset    Hypertension Mother     Diabetes Father                ==  Petey Look, DO  PGY-2  [facility] Internal Medicine [address] [facility]  [address]   [facility] , [facility] [address] [facility]  Office: [phone]  Fax: [phone]

## 2022-07-27 NOTE — PATIENT INSTRUCTIONS
Please STOP taking your ATORVASTATIN for now  Please get the blood work and X-rays done before your appointment with the Rheumatologist (joint doctor) on 9/27/2022

## 2022-08-02 ENCOUNTER — PATIENT OUTREACH (OUTPATIENT)
Dept: INTERNAL MEDICINE CLINIC | Facility: CLINIC | Age: 57
End: 2022-08-02

## 2022-08-02 NOTE — PROGRESS NOTES
MARCELLUS has received PCP request to assist pt with community resources as indicated   SW spoke to pt via  ID # [id number]  Pt does shares that she now has [employer] but she is concerned about the cost of getting blood work done  SW has explained the [facility] program and has provided their # [phone]  Pt relates she had [address] / [facility] years ago  She uses a discount program at [facility] to get her medications  Pt is not eligible for on going [place] not the [place] WA[employer] Program   Sw explained once she has a hospital bill she can re-apply and SW encouraged her to call for more info  Pt relates she resides with her dgt and babysits as clans houses to help get by  She is also aware of the  Food Bank at the [place] [address] of the ,  She relates she is independent in her ADLs  Pt denies any other needs at present  Pt has MARCELLUS # should she have additional questions in the future  Please re-consult MARCELLUS if needed

## 2022-08-11 DIAGNOSIS — E03.9 HYPOTHYROIDISM, UNSPECIFIED TYPE: ICD-10-CM

## 2022-08-11 RX ORDER — LEVOTHYROXINE SODIUM 0.05 MG/1
50 TABLET ORAL DAILY
Qty: 90 TABLET | Refills: 3 | Status: SHIPPED | OUTPATIENT
Start: 2022-08-11

## 2022-08-11 NOTE — TELEPHONE ENCOUNTER
Patient request Medication  refill be sent to [facility] [phone]      Patient states she has 4 pills left

## 2022-09-08 ENCOUNTER — TELEPHONE (OUTPATIENT)
Dept: INTERNAL MEDICINE CLINIC | Facility: CLINIC | Age: 57
End: 2022-09-08

## 2022-09-08 NOTE — TELEPHONE ENCOUNTER
Patient was present in office to get lab work done  Upon review of chart there are no active lab scripts in her chart  Patient stated that she discussed with Angela Villalobos that she was to get blood work done before she sees her Rheumatologist on 09/27/22  Please place scripts before the above date, if appropriate  Thank you

## 2022-09-09 DIAGNOSIS — R20.2 PARESTHESIA OF LEFT ARM: ICD-10-CM

## 2022-09-09 DIAGNOSIS — E03.9 HYPOTHYROIDISM, UNSPECIFIED TYPE: ICD-10-CM

## 2022-09-09 DIAGNOSIS — E03.8 SUBCLINICAL HYPOTHYROIDISM: ICD-10-CM

## 2022-09-09 DIAGNOSIS — Z00.00 HEALTHCARE MAINTENANCE: Primary | ICD-10-CM

## 2022-09-09 DIAGNOSIS — M79.89 BILATERAL HAND SWELLING: ICD-10-CM

## 2022-09-09 NOTE — TELEPHONE ENCOUNTER
Called using  [phone], per patient she must have labs done first and then she can apply for the financial program  Please place lab orders so patient can have completed

## 2022-09-09 NOTE — TELEPHONE ENCOUNTER
Per chart review, it appears Dr Erik Boston was planning to hold off on labs until obtaining financial assistance  Did this occur?

## 2022-09-09 NOTE — TELEPHONE ENCOUNTER
Reached out to patient to ask her if financial assistance has began  Did not get a hold of patient, had to leave a VM  Will try again at a later time

## 2022-09-13 ENCOUNTER — APPOINTMENT (OUTPATIENT)
Dept: LAB | Facility: CLINIC | Age: 57
End: 2022-09-13

## 2022-09-13 DIAGNOSIS — M79.89 BILATERAL HAND SWELLING: ICD-10-CM

## 2022-09-13 DIAGNOSIS — Z00.00 HEALTHCARE MAINTENANCE: ICD-10-CM

## 2022-09-13 DIAGNOSIS — E03.8 SUBCLINICAL HYPOTHYROIDISM: ICD-10-CM

## 2022-09-13 PROCEDURE — 84439 ASSAY OF FREE THYROXINE: CPT

## 2022-09-13 PROCEDURE — 86235 NUCLEAR ANTIGEN ANTIBODY: CPT

## 2022-09-13 PROCEDURE — 80061 LIPID PANEL: CPT

## 2022-09-13 PROCEDURE — 83036 HEMOGLOBIN GLYCOSYLATED A1C: CPT

## 2022-09-13 PROCEDURE — 85025 COMPLETE CBC W/AUTO DIFF WBC: CPT

## 2022-09-13 PROCEDURE — 86618 LYME DISEASE ANTIBODY: CPT

## 2022-09-13 PROCEDURE — 84443 ASSAY THYROID STIM HORMONE: CPT

## 2022-09-13 PROCEDURE — 36415 COLL VENOUS BLD VENIPUNCTURE: CPT

## 2022-09-13 PROCEDURE — 80048 BASIC METABOLIC PNL TOTAL CA: CPT

## 2022-09-14 LAB
ANION GAP SERPL CALCULATED.3IONS-SCNC: 4 MMOL/L (ref 4–13)
B BURGDOR IGG+IGM SER-ACNC: <0.2 AI
BASOPHILS # BLD AUTO: 0.09 THOUSANDS/ΜL (ref 0–0.1)
BASOPHILS NFR BLD AUTO: 2 % (ref 0–1)
BUN SERPL-MCNC: 9 MG/DL (ref 5–25)
CALCIUM SERPL-MCNC: 9 MG/DL (ref 8.3–10.1)
CHLORIDE SERPL-SCNC: 109 MMOL/L (ref 96–108)
CHOLEST SERPL-MCNC: 222 MG/DL
CO2 SERPL-SCNC: 28 MMOL/L (ref 21–32)
CREAT SERPL-MCNC: 0.56 MG/DL (ref 0.6–1.3)
EOSINOPHIL # BLD AUTO: 0.18 THOUSAND/ΜL (ref 0–0.61)
EOSINOPHIL NFR BLD AUTO: 3 % (ref 0–6)
ERYTHROCYTE [DISTWIDTH] IN BLOOD BY AUTOMATED COUNT: 12.3 % (ref 11.6–15.1)
EST. AVERAGE GLUCOSE BLD GHB EST-MCNC: 131 MG/DL
GFR SERPL CREATININE-BSD FRML MDRD: 103 ML/MIN/1.73SQ M
GLUCOSE P FAST SERPL-MCNC: 115 MG/DL (ref 65–99)
HBA1C MFR BLD: 6.2 %
HCT VFR BLD AUTO: 38.8 % (ref 34.8–46.1)
HDLC SERPL-MCNC: 53 MG/DL
HGB BLD-MCNC: 12.8 G/DL (ref 11.5–15.4)
IMM GRANULOCYTES # BLD AUTO: 0.01 THOUSAND/UL (ref 0–0.2)
IMM GRANULOCYTES NFR BLD AUTO: 0 % (ref 0–2)
LDLC SERPL CALC-MCNC: 142 MG/DL (ref 0–100)
LYMPHOCYTES # BLD AUTO: 1.77 THOUSANDS/ΜL (ref 0.6–4.47)
LYMPHOCYTES NFR BLD AUTO: 31 % (ref 14–44)
MCH RBC QN AUTO: 29.5 PG (ref 26.8–34.3)
MCHC RBC AUTO-ENTMCNC: 33 G/DL (ref 31.4–37.4)
MCV RBC AUTO: 89 FL (ref 82–98)
MONOCYTES # BLD AUTO: 0.31 THOUSAND/ΜL (ref 0.17–1.22)
MONOCYTES NFR BLD AUTO: 5 % (ref 4–12)
NEUTROPHILS # BLD AUTO: 3.39 THOUSANDS/ΜL (ref 1.85–7.62)
NEUTS SEG NFR BLD AUTO: 59 % (ref 43–75)
NRBC BLD AUTO-RTO: 0 /100 WBCS
PLATELET # BLD AUTO: 269 THOUSANDS/UL (ref 149–390)
PMV BLD AUTO: 10.4 FL (ref 8.9–12.7)
POTASSIUM SERPL-SCNC: 3.7 MMOL/L (ref 3.5–5.3)
RBC # BLD AUTO: 4.34 MILLION/UL (ref 3.81–5.12)
SODIUM SERPL-SCNC: 141 MMOL/L (ref 135–147)
T4 FREE SERPL-MCNC: 1.35 NG/DL (ref 0.76–1.46)
TRIGL SERPL-MCNC: 134 MG/DL
TSH SERPL DL<=0.05 MIU/L-ACNC: 0.12 UIU/ML (ref 0.45–4.5)
WBC # BLD AUTO: 5.75 THOUSAND/UL (ref 4.31–10.16)

## 2022-09-15 LAB
ENA SS-A AB SER-ACNC: <0.2 AI (ref 0–0.9)
ENA SS-B AB SER-ACNC: <0.2 AI (ref 0–0.9)

## 2022-09-19 ENCOUNTER — HOSPITAL ENCOUNTER (OUTPATIENT)
Dept: RADIOLOGY | Facility: HOSPITAL | Age: 57
Discharge: HOME | End: 2022-09-19

## 2022-09-19 DIAGNOSIS — R20.2 PARESTHESIA OF LEFT ARM: ICD-10-CM

## 2022-09-19 DIAGNOSIS — M79.89 BILATERAL HAND SWELLING: ICD-10-CM

## 2022-09-19 PROCEDURE — 73130 X-RAY EXAM OF HAND: CPT

## 2022-09-19 PROCEDURE — 72040 X-RAY EXAM NECK SPINE 2-3 VW: CPT

## 2022-10-04 ENCOUNTER — OFFICE VISIT (OUTPATIENT)
Dept: DENTISTRY | Facility: CLINIC | Age: 57
End: 2022-10-04

## 2022-10-04 DIAGNOSIS — K08.109 EDENTULISM: Primary | ICD-10-CM

## 2022-10-04 PROCEDURE — D0191 ASSESSMENT OF A PATIENT: HCPCS | Performed by: DENTIST

## 2022-10-04 NOTE — PROGRESS NOTES
Pt  Presents to clinic for a denture adjustment  Dentures delivered 7/19 and pt  Returned for adjustment a week later  [facility], no changes  Pt  Is ASA2 Pt  Reports no pain and no sensitivity  Pt  Says her dentures are comfortable and do not require any alterations  Checked intraorally, no sores noted, no candida infection noted, and no new findings  Dentures appear to fit well and be comfortable  No adjustments needed      NV: due for recall (3mrc)  NNV: denture adjustment as needed

## 2022-10-12 PROBLEM — Z00.00 HEALTHCARE MAINTENANCE: Status: RESOLVED | Noted: 2021-10-26 | Resolved: 2022-10-12

## 2022-11-01 ENCOUNTER — CONSULT (OUTPATIENT)
Dept: MULTI SPECIALTY CLINIC | Facility: CLINIC | Age: 57
End: 2022-11-01

## 2022-11-01 VITALS
HEART RATE: 70 BPM | WEIGHT: 150 LBS | OXYGEN SATURATION: 97 % | SYSTOLIC BLOOD PRESSURE: 109 MMHG | BODY MASS INDEX: 28.34 KG/M2 | TEMPERATURE: 97.5 F | DIASTOLIC BLOOD PRESSURE: 74 MMHG

## 2022-11-01 DIAGNOSIS — M06.042 RHEUMATOID ARTHRITIS INVOLVING BOTH HANDS WITH NEGATIVE RHEUMATOID FACTOR (HCC): Primary | ICD-10-CM

## 2022-11-01 DIAGNOSIS — E03.8 SUBCLINICAL HYPOTHYROIDISM: ICD-10-CM

## 2022-11-01 DIAGNOSIS — M06.041 RHEUMATOID ARTHRITIS INVOLVING BOTH HANDS WITH NEGATIVE RHEUMATOID FACTOR (HCC): Primary | ICD-10-CM

## 2022-11-01 RX ORDER — HYDROXYCHLOROQUINE SULFATE 200 MG/1
200 TABLET, FILM COATED ORAL
Qty: 90 TABLET | Refills: 2 | Status: SHIPPED | OUTPATIENT
Start: 2022-11-01 | End: 2023-01-30

## 2022-11-01 RX ORDER — MELOXICAM 7.5 MG/1
7.5 TABLET ORAL DAILY
Qty: 30 TABLET | Refills: 0 | Status: SHIPPED | OUTPATIENT
Start: 2022-11-01

## 2022-11-01 NOTE — PROGRESS NOTES
Assessment/Plan:     Diagnoses and all orders for this visit:    Rheumatoid arthritis involving both hands with negative rheumatoid factor (HCC)  -CCP positive (>330), RF (-)  Prior CCP (8 years ago) was WNL  ESR and CRP also elevated  -most likely early RA given high titer of CCP  -joint involvement at this time: left shoulder, left knee  Hands symptomatic but Xray WNL  -Family history of RA in mother  -will get Xray of left shoulder given pain with abduction, in addition to Xray of left knee  -bilateral hand Xray without apparent joint involvement (no joint space narrowing, osteophytes, edema)  -START plaquenil 200 mg qd  -START meloxicam 7 5 mg qd for 30 days  -f/u 3 months, and get ESR/CRP done just prior to visit  -     Ambulatory Referral to Rheumatology    Subclinical Hypothyroidism  -levothyroxine 50 mcg/qd  -last TSH/T4 on this medication showed TSH suppression <1 and free T4 WNL          Subjective:      Patient ID: Nani Eilas is a 61 y o  [place] female with positive inflammatory markers who is here for consult on possible RA  Has been having joint pains for 1 5 years  Affected joints are bilateral pink PIP/DIP, third finger MCP/PIP  She has not noticed enlargement of these joints  Does not endorse difficulty with gripping or wearing rings  She states stiffness/pain is worse in the morning and lasts 30-45 minutes  She also has bilateral shoulder pain  Has been losing hair  Is on levothyroxine 50 mcg/qd  Last TSH suppressed and free T4 WNL  No other systemic S&S  Denies: low-grade fever, rash, other joint involvement, oral ulcers, eye pain/grittyness, cough, SOB  Denies rashes during sun exposure  No symptoms of Raynauds  Mother had RA with crippling deformities of hands      Objective:    /74 (BP Location: Right arm, Patient Position: Sitting, Cuff Size: Large)   Pulse 70   Temp 97 5 °F (36 4 °C) (Temporal)   Wt 68 kg (150 lb)   SpO2 97%   BMI 28 34 kg/m² Physical Exam  Constitutional:       General: She is not in acute distress  Appearance: She is not ill-appearing, toxic-appearing or diaphoretic  HENT:      Mouth/Throat:      Mouth: Mucous membranes are moist    Cardiovascular:      Rate and Rhythm: Normal rate and regular rhythm  Pulses: Normal pulses  Heart sounds: No murmur heard  Pulmonary:      Effort: Pulmonary effort is normal  No respiratory distress  Breath sounds: Normal breath sounds  No wheezing or rales  Abdominal:      General: There is no distension  Palpations: Abdomen is soft  Tenderness: There is no abdominal tenderness  There is no guarding  Musculoskeletal:         General: No swelling, tenderness or deformity  Right lower leg: No edema  Left lower leg: No edema  Comments: Left shoulder with pain with abduction >90 degrees    Mild swelling of left knee synovium    (-) squeeze test on bilateral MTP and MCP   Skin:     General: Skin is warm  Findings: No rash  Comments: B/l LE varicose veins     Neurological:      Mental Status: She is alert and oriented to person, place, and time  Psychiatric:         Mood and Affect: Mood normal          Behavior: Behavior normal          Thought Content:  Thought content normal          Judgment: Judgment normal

## 2022-11-01 NOTE — PATIENT INSTRUCTIONS
-start daily Plaquenil 300 mg daily, and meloxicam for your  -return in 3 months   Get blood work done just prior to next rheumatology visit!  -please get the Xray of your left shoulder and left knee Head is atraumatic.

## 2022-11-02 ENCOUNTER — TELEPHONE (OUTPATIENT)
Dept: INTERNAL MEDICINE CLINIC | Facility: CLINIC | Age: 57
End: 2022-11-02

## 2022-11-02 NOTE — TELEPHONE ENCOUNTER
Patient states this medicine is very expensive to purchase as she has no insurance     She is requesting another affordable medicine in place of:  hydroxychloroquine (PLAQUENIL) 200 mg tablet

## 2022-11-03 ENCOUNTER — OFFICE VISIT (OUTPATIENT)
Dept: DENTISTRY | Facility: CLINIC | Age: 57
End: 2022-11-03

## 2022-11-03 VITALS — DIASTOLIC BLOOD PRESSURE: 70 MMHG | SYSTOLIC BLOOD PRESSURE: 107 MMHG | TEMPERATURE: 96.9 F

## 2022-11-03 DIAGNOSIS — Z00.00 ENCOUNTER FOR SCREENING AND PREVENTATIVE CARE: Primary | ICD-10-CM

## 2022-11-03 NOTE — PROGRESS NOTES
Reviewed Medical History with pt  Speaks Eng  Slightly and [place]se  ASA:II  Chief Complaint:sometimes clasp hurts #22    Periodic Exam, Perio maint  Intraoral exam:nf  Pt wearing FUD, PLD-Dr Ruel Chirinos adjusted partial around #22  4 mandibular teeth remain  Oral Hygiene:fair: lt-moder  plaque, no calculus, moderate bleeding  Spot probed  Perio :generalized 3mm, local  7mm #22 m   Hand scaled, Flossed, polished  Patient tolerated well  Dr Ruel Chirinos examined:Grade I mobility #22  Possible cause of pocket due to clasp-today's adjustment may relieve pressure      Needs: 3-4 mos perio maint  (oral hygiene is ok),Pas (2)      Joselito Blanton [facility] , PHDHP

## 2022-11-04 NOTE — TELEPHONE ENCOUNTER
Spoke to Gissel at Dr Cai Read office  She spoke with Dr Celina Mir and he advised that Plaquenil is the preferred medication and there are no alternatives  He recommends patient download the Good RX joseline or go onto [facility] to see if she can get it at a discounted price  I called patient via [employer]  [phone] to make her aware   Patient was agreeable to this and had no questions at this time

## 2022-11-11 ENCOUNTER — OFFICE VISIT (OUTPATIENT)
Dept: INTERNAL MEDICINE CLINIC | Facility: CLINIC | Age: 57
End: 2022-11-11

## 2022-11-11 VITALS
TEMPERATURE: 98.1 F | HEART RATE: 83 BPM | WEIGHT: 152 LBS | HEIGHT: 61 IN | SYSTOLIC BLOOD PRESSURE: 122 MMHG | BODY MASS INDEX: 28.7 KG/M2 | DIASTOLIC BLOOD PRESSURE: 73 MMHG

## 2022-11-11 DIAGNOSIS — Z23 NEED FOR TDAP VACCINATION: ICD-10-CM

## 2022-11-11 DIAGNOSIS — Z12.11 SCREENING FOR MALIGNANT NEOPLASM OF COLON: ICD-10-CM

## 2022-11-11 DIAGNOSIS — Z12.31 ENCOUNTER FOR SCREENING MAMMOGRAM FOR MALIGNANT NEOPLASM OF BREAST: ICD-10-CM

## 2022-11-11 DIAGNOSIS — E03.9 HYPOTHYROIDISM, UNSPECIFIED TYPE: ICD-10-CM

## 2022-11-11 DIAGNOSIS — E78.5 HYPERLIPIDEMIA, UNSPECIFIED HYPERLIPIDEMIA TYPE: ICD-10-CM

## 2022-11-11 DIAGNOSIS — M06.00 RHEUMATOID ARTHRITIS WITH NEGATIVE RHEUMATOID FACTOR, INVOLVING UNSPECIFIED SITE (HCC): ICD-10-CM

## 2022-11-11 DIAGNOSIS — Z00.00 ANNUAL PHYSICAL EXAM: Primary | ICD-10-CM

## 2022-11-11 DIAGNOSIS — M79.89 BILATERAL HAND SWELLING: ICD-10-CM

## 2022-11-11 PROBLEM — M06.09 RHEUMATOID ARTHRITIS OF MULTIPLE SITES WITH NEGATIVE RHEUMATOID FACTOR (HCC): Status: ACTIVE | Noted: 2022-11-11

## 2022-11-11 PROBLEM — I01.1 RHEUMATOID AORTITIS: Status: ACTIVE | Noted: 2022-11-11

## 2022-11-11 PROBLEM — I01.1 RHEUMATOID AORTITIS: Status: RESOLVED | Noted: 2022-11-11 | Resolved: 2022-11-11

## 2022-11-11 RX ORDER — LEVOTHYROXINE SODIUM 0.03 MG/1
25 TABLET ORAL
Qty: 30 TABLET | Refills: 5 | Status: SHIPPED | OUTPATIENT
Start: 2022-11-11

## 2022-11-11 NOTE — PATIENT INSTRUCTIONS
Please start taking the LEVOTHYROXINE at the new dose of 25 mcg once a day  Please get the blood work done in 3 months to check your thyroid levels  Please get your mammogram done when you are able to  We will call you with the results  Please provide a stool sample for your colon cancer screening  Please make an appointment to see the eye doctor  Please follow up with the joint doctor (Rheumatology) as scheduled in February 2023

## 2022-11-11 NOTE — PROGRESS NOTES
[address] [facility]    NAME: Florian Morrison  AGE: 61 y o  SEX: female  : 1965     DATE: 2022     Assessment and Plan:     Problem List Items Addressed This Visit    None     Visit Diagnoses     Annual physical exam    -  Primary    Hypothyroidism, unspecified type        Relevant Medications    levothyroxine (Euthyrox) 25 mcg tablet    Other Relevant Orders    TSH, 3rd generation with Free T4 reflex  Will recheck thyroid function in 3 months after dose adjustment of levothyroxine from 50 to 25 mcg daily    Hyperlipidemia, unspecified hyperlipidemia type      ASCVD risk 2 0% excluding RA diagnosis, will hold off on statin for now and monitor with diet changes    Bilateral hand swelling      Continue Hydroxychloroquine and Mobic  Continue Rheumatology follow up    Need for Tdap vaccination        Relevant Orders    TDAP VACCINE GREATER THAN OR EQUAL TO 8YO IM (Completed)    Encounter for screening mammogram for malignant neoplasm of breast        Relevant Orders    Mammo screening bilateral w 3d & cad    Screening for malignant neoplasm of colon        Relevant Orders    Occult Blood, Fecal Immunochemical    Rheumatoid arthritis with negative rheumatoid factor, involving unspecified site [facility])        Relevant Orders    Ambulatory Referral to Ophthalmology - monitoring on Plaquenil          Immunizations and preventive care screenings were discussed with patient today  Appropriate education was printed on patient's after visit summary  Counseling:  Alcohol/drug use: discussed moderation in alcohol intake, the recommendations for healthy alcohol use, and avoidance of illicit drug use  Dental Health: discussed importance of regular tooth brushing, flossing, and dental visits  · Exercise: the importance of regular exercise/physical activity was discussed   Recommend exercise 3-5 times per week for at least 30 minutes  Return in about 6 months (around 5/11/2023) for follow up chronic conditions  Chief Complaint:     Chief Complaint   Patient presents with   • Physical Exam      History of Present Illness:     Adult Annual Physical   Patient here for a comprehensive physical exam  Has PMH of subclinical hypothyroidism, HLD and recently diagnosed rheumatoid arthritis  Recently saw Rheum and was started on Plaquenil and Mobic; she has had some minor GI upset (primarily some loose stools) otherwise she has noticed some improvement in her arthralgias  She is having some pain in her right hip/flank that radiates down the back of her leg  She recognizes that without insurance, XRays would be expensive and is ok with deferring imaging at this time as this pain remits with Tylenol  Went over recent lab work  Patient has hyperlipidemia (, ) and is prediabetic (A1c 6 2, BG on ), so we discussed diet and exercise changes that she should make at length  This includes increasing consumption of vegetables, fruits, protein and decrease her consumption of carbohydrates, processed foods, dairy  In addition, her TSH is low 0 117 associated with recent GI upset (see above), so we discussed reducing her levothyroxine dose  That being said, aside from this GI upset, she admits to some diaphoresis but denies palpitations, tremors, weight loss  Discussed other healthcare maintenance  She is agreeable to proceed with mammogram and FIT testing as scheduled  She is agreeable to Dtap vaccine today  She already got her flu vaccine last week  She has never smoked cigarettes or used alcohol  Diet and Physical Activity  · Diet/Nutrition: well balanced diet  · Exercise: walking  Depression Screening  PHQ-2/9 Depression Screening         General Health  · Sleep: intermittently has trouble sleeping but is not affecting her daily functioning  · Hearing: normal - bilateral   · Vision: wears glasses  · Dental: regular dental visits  /GYN Health  · Patient is: postmenopausal  · Last menstrual period: at 46yo  · Contraceptive method: none  Review of Systems:     Review of Systems - 12 point ROS performed and negative unless stated above  Past Medical History:     Past Medical History:   Diagnosis Date   • Hypothyroidism    • Periodontal disease 2022      Past Surgical History:     Past Surgical History:   Procedure Laterality Date   •  SECTION     • TUBAL LIGATION        Social History:     Social History     Socioeconomic History   • Marital status: Single     Spouse name: None   • Number of children: None   • Years of education: None   • Highest education level: None   Occupational History   • None   Tobacco Use   • Smoking status: Never Smoker   • Smokeless tobacco: Never Used   Vaping Use   • Vaping Use: Never used   Substance and Sexual Activity   • Alcohol use: Not Currently     Comment: social   • Drug use: Never   • Sexual activity: Yes     Partners: Male     Birth control/protection: Post-menopausal   Other Topics Concern   • None   Social History Narrative   • None     Social Determinants of Health     Financial Resource Strain: Medium Risk   • Difficulty of Paying Living Expenses: Somewhat hard   Food Insecurity: No Food Insecurity   • Worried About Running Out of Food in the Last Year: Never true   • Ran Out of Food in the Last Year: Never true   Transportation Needs: No Transportation Needs   • Lack of Transportation (Medical): No   • Lack of Transportation (Non-Medical):  No   Physical Activity: Not on file   Stress: Not on file   Social Connections: Not on file   Intimate Partner Violence: Not on file   Housing Stability: Low Risk    • Unable to Pay for Housing in the Last Year: No   • Number of Places Lived in the Last Year: 1   • Unstable Housing in the Last Year: No      Family History:     Family History   Problem Relation Age of Onset   • Hypertension Mother    • Diabetes Father       Current Medications:     Current Outpatient Medications   Medication Sig Dispense Refill   • acetaminophen (TYLENOL) 500 mg tablet Take 500 mg by mouth every 6 (six) hours as needed     • conjugated estrogens (Premarin) vaginal cream Insert 0 5 g into the vagina daily 30 g 2   • Glucosamine-Chondroitin (Osteo Bi-Flex Regular Strength) 250-200 MG TABS Take by mouth       • hydroxychloroquine (PLAQUENIL) 200 mg tablet Take 1 tablet (200 mg total) by mouth daily with breakfast 90 tablet 2   • levothyroxine (Euthyrox) 25 mcg tablet Take 1 tablet (25 mcg total) by mouth daily in the early morning 30 tablet 5   • meloxicam (Mobic) 7 5 mg tablet Take 1 tablet (7 5 mg total) by mouth daily 30 tablet 0   • multivitamin (THERAGRAN) TABS Take 1 tablet by mouth daily     • atorvastatin (LIPITOR) 10 mg tablet Take 1 tablet (10 mg total) by mouth daily (Patient not taking: No sig reported) 30 tablet 2     No current facility-administered medications for this visit  Allergies:     No Known Allergies   Physical Exam:     /73 (BP Location: Right arm, Patient Position: Sitting, Cuff Size: Adult)   Pulse 83   Temp 98 1 °F (36 7 °C) (Temporal)   Ht 5' 1" (1 549 m)   Wt 68 9 kg (152 lb)   BMI 28 72 kg/m²     Physical Exam  Vitals reviewed  Constitutional:       General: She is not in acute distress  Appearance: Normal appearance  She is well-developed  HENT:      Head: Normocephalic and atraumatic  Eyes:      Conjunctiva/sclera: Conjunctivae normal    Cardiovascular:      Rate and Rhythm: Normal rate and regular rhythm  Heart sounds: S1 normal and S2 normal  No murmur heard  Pulmonary:      Effort: Pulmonary effort is normal  No respiratory distress  Breath sounds: Normal breath sounds  Abdominal:      General: Bowel sounds are normal  There is no distension  Palpations: Abdomen is soft  Tenderness: There is no abdominal tenderness     Musculoskeletal:      Cervical back: Neck supple  Right lower leg: No edema  Left lower leg: No edema  Skin:     General: Skin is warm and dry  Neurological:      Mental Status: She is alert and oriented to person, place, and time  Psychiatric:         Mood and Affect: Mood normal          Thought Content:  Thought content normal           Niki Rogel DO  [address]

## 2022-12-01 ENCOUNTER — APPOINTMENT (OUTPATIENT)
Dept: LAB | Facility: CLINIC | Age: 57
End: 2022-12-01

## 2022-12-01 DIAGNOSIS — M06.041 RHEUMATOID ARTHRITIS INVOLVING BOTH HANDS WITH NEGATIVE RHEUMATOID FACTOR (HCC): ICD-10-CM

## 2022-12-01 DIAGNOSIS — M06.042 RHEUMATOID ARTHRITIS INVOLVING BOTH HANDS WITH NEGATIVE RHEUMATOID FACTOR (HCC): ICD-10-CM

## 2022-12-01 LAB
CRP SERPL QL: <3 MG/L
ERYTHROCYTE [SEDIMENTATION RATE] IN BLOOD: 25 MM/HOUR (ref 0–29)

## 2022-12-02 ENCOUNTER — APPOINTMENT (OUTPATIENT)
Dept: LAB | Facility: CLINIC | Age: 57
End: 2022-12-02

## 2022-12-02 DIAGNOSIS — Z12.11 SCREENING FOR MALIGNANT NEOPLASM OF COLON: ICD-10-CM

## 2022-12-02 LAB — HEMOCCULT STL QL IA: NEGATIVE

## 2023-01-09 ENCOUNTER — OFFICE VISIT (OUTPATIENT)
Dept: INTERNAL MEDICINE CLINIC | Facility: CLINIC | Age: 58
End: 2023-01-09

## 2023-01-09 VITALS
TEMPERATURE: 98.3 F | HEART RATE: 71 BPM | BODY MASS INDEX: 27.9 KG/M2 | OXYGEN SATURATION: 98 % | HEIGHT: 61 IN | SYSTOLIC BLOOD PRESSURE: 108 MMHG | DIASTOLIC BLOOD PRESSURE: 74 MMHG | WEIGHT: 147.8 LBS

## 2023-01-09 DIAGNOSIS — R10.9 STOMACH PAIN: Primary | ICD-10-CM

## 2023-01-09 RX ORDER — OMEPRAZOLE 40 MG/1
40 CAPSULE, DELAYED RELEASE ORAL DAILY
Qty: 14 CAPSULE | Refills: 0 | Status: SHIPPED | OUTPATIENT
Start: 2023-01-09

## 2023-01-09 NOTE — PROGRESS NOTES
[address] [facility]  INTERNAL MEDICINE OFFICE VISIT     PATIENT INFORMATION     Otis Díaz   61 y o  female   MRN: [id number]    ASSESSMENT/PLAN     1  Stomach pain  -     omeprazole (PriLOSEC) 40 MG capsule; Take 1 capsule (40 mg total) by mouth daily   - Patient presents to clinic for same-day visit for 10 days of stomach pain  - Patient states that pain is worse with eating, particularly at breakfast after she takes Plaquenil   - Patient does state that pain does seem to be worse with acidic foods   - Concerned that patient may be having adverse effect from Plaquenil as stomach pain is known side effect   - Instructed patient's on the importance of taking Plaquenil after completing full breakfast   - Will also treat with omeprazole for 2 weeks to see if there may be improvement in symptoms  - If no improvement with omeprazole, advised patient to contact rheumatology office for further advice as she may need to discontinue Plaquenil  HEALTH MAINTENANCE     Immunization History   Administered Date(s) Administered   • COVID-19 PFIZER VACCINE 0 3 ML IM 04/10/2021, 05/01/2021, 12/04/2021, 07/13/2022   • Influenza, recombinant, quadrivalent,injectable, preservative free 10/26/2021   • Influenza, seasonal, injectable 10/15/2012, 11/18/2013, 10/06/2014, 10/18/2016   • Tdap 10/15/2012, 11/11/2022         CHIEF COMPLAINT     Chief Complaint   Patient presents with   • Abdominal Pain     Pain for about 10 days      HISTORY OF PRESENT ILLNESS     Ms Mandeep Albert is a 79-year-old female, Tajik speaking and encounter included use of [email or web address]  #[id number]  Patient has past medical history of subclinical hypothyroidism, hyperlipidemia, recently diagnosed rheumatoid arthritis for which she started treatment with Plaquenil and Mobic  Patient sees rheumatology who prescribes these medications to her    Per chart review, patient had some minor GI upset in the past including loose stools when plan Plaquenil was initiated but improvement in arthralgias  Patient is seen in office today for same-day visit for stomach pain  She states that she has been having severe stomach pain for the past 10 days  Patient states that in the morning she takes her levothyroxine, and then prior to breakfast she will take her Plaquenil  She has noticed that after taking Plaquenil in the morning she starts to develop stomach pain  Tylenol helped initially, but pain progressed and Tylenol is no longer helping  Patient describes the pain as a "fist in her stomach "with sharp pain  Patient states that she feels some radiation of pain into her back along her flanks does feel some associated nausea but no vomiting  Patient describes no pain in her chest or in the back of her throat  No burning sensations or distaste  Patient does state that the pain does seem to get worse after eating and taking her medicine  Particularly, acidic or greasy foods seem to make pain worse though it is worse with any eating  Some nausea, no vomiting  No diarrhea or constipation  No bright red blood in stool and no black stool noted  Patient does report a history of gallstones many years ago  Patient was taking meloxicam when she was first diagnosed and treated with rheumatoid arthritis  However, patient and only taking 1 month of meloxicam and that ended approximately 1 month ago  She is now taking Tylenol for the pain and will occasionally take 1 Advil if pain is severe  Presently denies any fever, chills, nausea, vomiting, diarrhea, constipation, chest pain, shortness of breath  REVIEW OF SYSTEMS     Review of Systems   Constitutional: Negative for chills, fatigue and fever  HENT: Negative for congestion and rhinorrhea  Eyes: Negative for pain and redness  Respiratory: Negative for cough, choking, shortness of breath and wheezing      Cardiovascular: Negative for chest pain, palpitations and leg swelling  Gastrointestinal: Positive for abdominal pain and nausea  Negative for abdominal distention, blood in stool, constipation, diarrhea and vomiting  Genitourinary: Negative for difficulty urinating and dysuria  Musculoskeletal: Positive for back pain  Negative for arthralgias  Skin: Negative for rash and wound  Neurological: Negative for dizziness, syncope, speech difficulty, weakness, light-headedness and headaches  Psychiatric/Behavioral: Negative for agitation, behavioral problems and confusion  OBJECTIVE     Vitals:    01/09/23 1107   BP: 108/74   BP Location: Right arm   Patient Position: Sitting   Cuff Size: Standard   Pulse: 71   Temp: 98 3 °F (36 8 °C)   TempSrc: Temporal   SpO2: 98%   Weight: 67 kg (147 lb 12 8 oz)   Height: 5' 1" (1 549 m)     Physical Exam  Constitutional:       General: She is not in acute distress  Appearance: Normal appearance  She is not ill-appearing  Cardiovascular:      Rate and Rhythm: Normal rate and regular rhythm  Pulses: Normal pulses  Heart sounds: Normal heart sounds  No murmur heard  Pulmonary:      Effort: Pulmonary effort is normal  No respiratory distress  Breath sounds: Normal breath sounds  No wheezing, rhonchi or rales  Abdominal:      General: Abdomen is flat  Bowel sounds are normal  There is no distension  Palpations: Abdomen is soft  There is no mass  Tenderness: There is abdominal tenderness (severe tenderness to palpation at center of stomach  )  Musculoskeletal:      Right lower leg: No edema  Left lower leg: No edema  Neurological:      Mental Status: She is alert and oriented to person, place, and time  Psychiatric:         Mood and Affect: Mood normal          Behavior: Behavior normal          Thought Content:  Thought content normal        CURRENT MEDICATIONS     Current Outpatient Medications:   •  acetaminophen (TYLENOL) 500 mg tablet, Take 500 mg by mouth every 6 (six) hours as needed, Disp: , Rfl:   •  conjugated estrogens (Premarin) vaginal cream, Insert 0 5 g into the vagina daily, Disp: 30 g, Rfl: 2  •  Glucosamine-Chondroitin (Osteo Bi-Flex Regular Strength) 250-200 MG TABS, Take by mouth  , Disp: , Rfl:   •  hydroxychloroquine (PLAQUENIL) 200 mg tablet, Take 1 tablet (200 mg total) by mouth daily with breakfast, Disp: 90 tablet, Rfl: 2  •  levothyroxine (Euthyrox) 25 mcg tablet, Take 1 tablet (25 mcg total) by mouth daily in the early morning, Disp: 30 tablet, Rfl: 5  •  multivitamin (THERAGRAN) TABS, Take 1 tablet by mouth daily, Disp: , Rfl:   •  omeprazole (PriLOSEC) 40 MG capsule, Take 1 capsule (40 mg total) by mouth daily, Disp: 14 capsule, Rfl: 0  •  atorvastatin (LIPITOR) 10 mg tablet, Take 1 tablet (10 mg total) by mouth daily (Patient not taking: Reported on 11/3/2022), Disp: 30 tablet, Rfl: 2    PAST MEDICAL & SURGICAL HISTORY     Past Medical History:   Diagnosis Date   • Hypothyroidism    • Periodontal disease 2022     Past Surgical History:   Procedure Laterality Date   •  SECTION     • TUBAL LIGATION       SOCIAL & FAMILY HISTORY     Social History     Socioeconomic History   • Marital status: Single     Spouse name: Not on file   • Number of children: Not on file   • Years of education: Not on file   • Highest education level: Not on file   Occupational History   • Not on file   Tobacco Use   • Smoking status: Never   • Smokeless tobacco: Never   Vaping Use   • Vaping Use: Never used   Substance and Sexual Activity   • Alcohol use: Not Currently     Comment: social   • Drug use: Never   • Sexual activity: Yes     Partners: Male     Birth control/protection: Post-menopausal   Other Topics Concern   • Not on file   Social History Narrative   • Not on file     Social Determinants of Health     Financial Resource Strain: Medium Risk   • Difficulty of Paying Living Expenses: Somewhat hard   Food Insecurity: No Food Insecurity   • Worried About Running Out of Food in the Last Year: Never true   • Ran Out of Food in the Last Year: Never true   Transportation Needs: No Transportation Needs   • Lack of Transportation (Medical): No   • Lack of Transportation (Non-Medical): No   Physical Activity: Not on file   Stress: Not on file   Social Connections: Not on file   Intimate Partner Violence: Not on file   Housing Stability: Low Risk    • Unable to Pay for Housing in the Last Year: No   • Number of Places Lived in the Last Year: 1   • Unstable Housing in the Last Year: No     Social History     Substance and Sexual Activity   Alcohol Use Not Currently    Comment: social     Social History     Substance and Sexual Activity   Drug Use Never     Social History     Tobacco Use   Smoking Status Never   Smokeless Tobacco Never     Family History   Problem Relation Age of Onset   • Hypertension Mother    • Diabetes Father      ==  Agustín Vizcaino,   Internal Medicine Residency, PGY-2  [address]   [facility] , [address] [address] [facility] [address] [facility]  Office: [phone]  Fax: [phone]

## 2023-01-13 ENCOUNTER — APPOINTMENT (EMERGENCY)
Dept: RADIOLOGY | Facility: HOSPITAL | Age: 58
End: 2023-01-13

## 2023-01-13 ENCOUNTER — HOSPITAL ENCOUNTER (INPATIENT)
Facility: HOSPITAL | Age: 58
LOS: 3 days | Discharge: HOME | End: 2023-01-17
Attending: EMERGENCY MEDICINE | Admitting: SURGERY

## 2023-01-13 DIAGNOSIS — R10.9 ABDOMINAL PAIN: ICD-10-CM

## 2023-01-13 DIAGNOSIS — R79.89 ELEVATED LFTS: ICD-10-CM

## 2023-01-13 DIAGNOSIS — K80.50 CHOLEDOCHOLITHIASIS: ICD-10-CM

## 2023-01-13 DIAGNOSIS — K80.20 SYMPTOMATIC CHOLELITHIASIS: ICD-10-CM

## 2023-01-13 DIAGNOSIS — K80.21 CALCULUS OF GALLBLADDER WITH BILIARY OBSTRUCTION BUT WITHOUT CHOLECYSTITIS: Primary | ICD-10-CM

## 2023-01-13 DIAGNOSIS — K80.20 CHOLELITHIASIS: ICD-10-CM

## 2023-01-13 LAB
ALBUMIN SERPL BCP-MCNC: 3.6 G/DL (ref 3.5–5)
ALP SERPL-CCNC: 557 U/L (ref 46–116)
ALT SERPL W P-5'-P-CCNC: 367 U/L (ref 12–78)
ANION GAP SERPL CALCULATED.3IONS-SCNC: 7 MMOL/L (ref 4–13)
AST SERPL W P-5'-P-CCNC: 228 U/L (ref 5–45)
BASOPHILS # BLD AUTO: 0.04 THOUSANDS/ÂΜL (ref 0–0.1)
BASOPHILS NFR BLD AUTO: 1 % (ref 0–1)
BILIRUB SERPL-MCNC: 2.2 MG/DL (ref 0.2–1)
BUN SERPL-MCNC: 11 MG/DL (ref 5–25)
CALCIUM SERPL-MCNC: 9.1 MG/DL (ref 8.3–10.1)
CARDIAC TROPONIN I PNL SERPL HS: 6 NG/L
CHLORIDE SERPL-SCNC: 107 MMOL/L (ref 96–108)
CO2 SERPL-SCNC: 27 MMOL/L (ref 21–32)
CREAT SERPL-MCNC: 0.51 MG/DL (ref 0.6–1.3)
EOSINOPHIL # BLD AUTO: 0.09 THOUSAND/ÂΜL (ref 0–0.61)
EOSINOPHIL NFR BLD AUTO: 1 % (ref 0–6)
ERYTHROCYTE [DISTWIDTH] IN BLOOD BY AUTOMATED COUNT: 13.2 % (ref 11.6–15.1)
GFR SERPL CREATININE-BSD FRML MDRD: 106 ML/MIN/1.73SQ M
GLUCOSE SERPL-MCNC: 123 MG/DL (ref 65–140)
HCT VFR BLD AUTO: 32.9 % (ref 34.8–46.1)
HGB BLD-MCNC: 10.7 G/DL (ref 11.5–15.4)
IMM GRANULOCYTES # BLD AUTO: 0.02 THOUSAND/UL (ref 0–0.2)
IMM GRANULOCYTES NFR BLD AUTO: 0 % (ref 0–2)
LIPASE SERPL-CCNC: 201 U/L (ref 73–393)
LYMPHOCYTES # BLD AUTO: 1.35 THOUSANDS/ÂΜL (ref 0.6–4.47)
LYMPHOCYTES NFR BLD AUTO: 20 % (ref 14–44)
MCH RBC QN AUTO: 29.4 PG (ref 26.8–34.3)
MCHC RBC AUTO-ENTMCNC: 32.5 G/DL (ref 31.4–37.4)
MCV RBC AUTO: 90 FL (ref 82–98)
MONOCYTES # BLD AUTO: 0.49 THOUSAND/ÂΜL (ref 0.17–1.22)
MONOCYTES NFR BLD AUTO: 7 % (ref 4–12)
NEUTROPHILS # BLD AUTO: 4.93 THOUSANDS/ÂΜL (ref 1.85–7.62)
NEUTS SEG NFR BLD AUTO: 71 % (ref 43–75)
NRBC BLD AUTO-RTO: 0 /100 WBCS
PLATELET # BLD AUTO: 302 THOUSANDS/UL (ref 149–390)
PMV BLD AUTO: 9.8 FL (ref 8.9–12.7)
POTASSIUM SERPL-SCNC: 3.2 MMOL/L (ref 3.5–5.3)
PROT SERPL-MCNC: 7.2 G/DL (ref 6.4–8.4)
RBC # BLD AUTO: 3.64 MILLION/UL (ref 3.81–5.12)
SODIUM SERPL-SCNC: 141 MMOL/L (ref 135–147)
WBC # BLD AUTO: 6.92 THOUSAND/UL (ref 4.31–10.16)

## 2023-01-13 RX ORDER — POTASSIUM CHLORIDE 20 MEQ/1
40 TABLET, EXTENDED RELEASE ORAL ONCE
Status: COMPLETED | OUTPATIENT
Start: 2023-01-14 | End: 2023-01-14

## 2023-01-13 RX ORDER — ONDANSETRON 2 MG/ML
4 INJECTION INTRAMUSCULAR; INTRAVENOUS ONCE
Status: COMPLETED | OUTPATIENT
Start: 2023-01-13 | End: 2023-01-13

## 2023-01-13 RX ADMIN — SODIUM CHLORIDE 1000 ML: 0.9 INJECTION, SOLUTION INTRAVENOUS at 20:35

## 2023-01-13 RX ADMIN — ONDANSETRON 4 MG: 2 INJECTION INTRAMUSCULAR; INTRAVENOUS at 20:34

## 2023-01-13 RX ADMIN — IOHEXOL 100 ML: 350 INJECTION, SOLUTION INTRAVENOUS at 22:48

## 2023-01-13 RX ADMIN — MORPHINE SULFATE 2 MG: 2 INJECTION, SOLUTION INTRAMUSCULAR; INTRAVENOUS at 21:30

## 2023-01-14 ENCOUNTER — APPOINTMENT (INPATIENT)
Dept: RADIOLOGY | Facility: HOSPITAL | Age: 58
End: 2023-01-14

## 2023-01-14 PROBLEM — K80.20 SYMPTOMATIC CHOLELITHIASIS: Status: ACTIVE | Noted: 2023-01-14

## 2023-01-14 LAB
2HR DELTA HS TROPONIN: 1 NG/L
ALBUMIN SERPL BCP-MCNC: 3.1 G/DL (ref 3.5–5)
ALP SERPL-CCNC: 539 U/L (ref 46–116)
ALT SERPL W P-5'-P-CCNC: 361 U/L (ref 12–78)
ANION GAP SERPL CALCULATED.3IONS-SCNC: 6 MMOL/L (ref 4–13)
AST SERPL W P-5'-P-CCNC: 238 U/L (ref 5–45)
BASOPHILS # BLD AUTO: 0.04 THOUSANDS/ÂΜL (ref 0–0.1)
BASOPHILS NFR BLD AUTO: 1 % (ref 0–1)
BILIRUB SERPL-MCNC: 1.12 MG/DL (ref 0.2–1)
BUN SERPL-MCNC: 7 MG/DL (ref 5–25)
CALCIUM ALBUM COR SERPL-MCNC: 9.5 MG/DL (ref 8.3–10.1)
CALCIUM SERPL-MCNC: 8.8 MG/DL (ref 8.3–10.1)
CARDIAC TROPONIN I PNL SERPL HS: 7 NG/L
CHLORIDE SERPL-SCNC: 112 MMOL/L (ref 96–108)
CO2 SERPL-SCNC: 27 MMOL/L (ref 21–32)
CREAT SERPL-MCNC: 0.51 MG/DL (ref 0.6–1.3)
EOSINOPHIL # BLD AUTO: 0.1 THOUSAND/ÂΜL (ref 0–0.61)
EOSINOPHIL NFR BLD AUTO: 2 % (ref 0–6)
ERYTHROCYTE [DISTWIDTH] IN BLOOD BY AUTOMATED COUNT: 13.1 % (ref 11.6–15.1)
GFR SERPL CREATININE-BSD FRML MDRD: 106 ML/MIN/1.73SQ M
GLUCOSE SERPL-MCNC: 116 MG/DL (ref 65–140)
HCT VFR BLD AUTO: 34 % (ref 34.8–46.1)
HGB BLD-MCNC: 10.8 G/DL (ref 11.5–15.4)
IMM GRANULOCYTES # BLD AUTO: 0.01 THOUSAND/UL (ref 0–0.2)
IMM GRANULOCYTES NFR BLD AUTO: 0 % (ref 0–2)
LYMPHOCYTES # BLD AUTO: 1.5 THOUSANDS/ÂΜL (ref 0.6–4.47)
LYMPHOCYTES NFR BLD AUTO: 25 % (ref 14–44)
MAGNESIUM SERPL-MCNC: 2.2 MG/DL (ref 1.6–2.6)
MCH RBC QN AUTO: 29.3 PG (ref 26.8–34.3)
MCHC RBC AUTO-ENTMCNC: 31.8 G/DL (ref 31.4–37.4)
MCV RBC AUTO: 92 FL (ref 82–98)
MONOCYTES # BLD AUTO: 0.42 THOUSAND/ÂΜL (ref 0.17–1.22)
MONOCYTES NFR BLD AUTO: 7 % (ref 4–12)
NEUTROPHILS # BLD AUTO: 3.91 THOUSANDS/ÂΜL (ref 1.85–7.62)
NEUTS SEG NFR BLD AUTO: 65 % (ref 43–75)
NRBC BLD AUTO-RTO: 0 /100 WBCS
PHOSPHATE SERPL-MCNC: 3.4 MG/DL (ref 2.7–4.5)
PLATELET # BLD AUTO: 309 THOUSANDS/UL (ref 149–390)
PMV BLD AUTO: 10.1 FL (ref 8.9–12.7)
POTASSIUM SERPL-SCNC: 3.5 MMOL/L (ref 3.5–5.3)
PROT SERPL-MCNC: 6.6 G/DL (ref 6.4–8.4)
RBC # BLD AUTO: 3.68 MILLION/UL (ref 3.81–5.12)
SODIUM SERPL-SCNC: 145 MMOL/L (ref 135–147)
WBC # BLD AUTO: 5.98 THOUSAND/UL (ref 4.31–10.16)

## 2023-01-14 RX ORDER — HEPARIN SODIUM 5000 [USP'U]/ML
5000 INJECTION, SOLUTION INTRAVENOUS; SUBCUTANEOUS EVERY 8 HOURS SCHEDULED
Status: DISCONTINUED | OUTPATIENT
Start: 2023-01-14 | End: 2023-01-17 | Stop reason: HOSPADM

## 2023-01-14 RX ORDER — OXYCODONE HYDROCHLORIDE 5 MG/1
5 TABLET ORAL EVERY 4 HOURS PRN
Status: DISCONTINUED | OUTPATIENT
Start: 2023-01-14 | End: 2023-01-17 | Stop reason: HOSPADM

## 2023-01-14 RX ORDER — HYDROMORPHONE HCL/PF 1 MG/ML
0.5 SYRINGE (ML) INJECTION EVERY 4 HOURS PRN
Status: DISCONTINUED | OUTPATIENT
Start: 2023-01-14 | End: 2023-01-17 | Stop reason: HOSPADM

## 2023-01-14 RX ORDER — PANTOPRAZOLE SODIUM 40 MG/1
40 TABLET, DELAYED RELEASE ORAL
Status: DISCONTINUED | OUTPATIENT
Start: 2023-01-14 | End: 2023-01-17 | Stop reason: HOSPADM

## 2023-01-14 RX ORDER — ACETAMINOPHEN 325 MG/1
650 TABLET ORAL EVERY 6 HOURS SCHEDULED
Status: DISCONTINUED | OUTPATIENT
Start: 2023-01-14 | End: 2023-01-17 | Stop reason: HOSPADM

## 2023-01-14 RX ORDER — OXYCODONE HYDROCHLORIDE 10 MG/1
10 TABLET ORAL EVERY 4 HOURS PRN
Status: DISCONTINUED | OUTPATIENT
Start: 2023-01-14 | End: 2023-01-17 | Stop reason: HOSPADM

## 2023-01-14 RX ORDER — HYDROXYCHLOROQUINE SULFATE 200 MG/1
200 TABLET, FILM COATED ORAL
Status: DISCONTINUED | OUTPATIENT
Start: 2023-01-14 | End: 2023-01-17 | Stop reason: HOSPADM

## 2023-01-14 RX ORDER — ONDANSETRON 2 MG/ML
4 INJECTION INTRAMUSCULAR; INTRAVENOUS EVERY 6 HOURS PRN
Status: DISCONTINUED | OUTPATIENT
Start: 2023-01-14 | End: 2023-01-17 | Stop reason: HOSPADM

## 2023-01-14 RX ORDER — SODIUM CHLORIDE, SODIUM LACTATE, POTASSIUM CHLORIDE, CALCIUM CHLORIDE 600; 310; 30; 20 MG/100ML; MG/100ML; MG/100ML; MG/100ML
125 INJECTION, SOLUTION INTRAVENOUS CONTINUOUS
Status: DISCONTINUED | OUTPATIENT
Start: 2023-01-14 | End: 2023-01-16

## 2023-01-14 RX ORDER — POTASSIUM CHLORIDE 20 MEQ/1
40 TABLET, EXTENDED RELEASE ORAL ONCE
Status: COMPLETED | OUTPATIENT
Start: 2023-01-14 | End: 2023-01-14

## 2023-01-14 RX ORDER — LEVOTHYROXINE SODIUM 0.03 MG/1
25 TABLET ORAL
Status: DISCONTINUED | OUTPATIENT
Start: 2023-01-14 | End: 2023-01-17 | Stop reason: HOSPADM

## 2023-01-14 RX ADMIN — POTASSIUM CHLORIDE 40 MEQ: 1500 TABLET, EXTENDED RELEASE ORAL at 08:12

## 2023-01-14 RX ADMIN — OXYCODONE HYDROCHLORIDE 10 MG: 10 TABLET ORAL at 17:35

## 2023-01-14 RX ADMIN — OXYCODONE HYDROCHLORIDE 10 MG: 10 TABLET ORAL at 12:23

## 2023-01-14 RX ADMIN — PANTOPRAZOLE SODIUM 40 MG: 40 TABLET, DELAYED RELEASE ORAL at 12:27

## 2023-01-14 RX ADMIN — SODIUM CHLORIDE, SODIUM LACTATE, POTASSIUM CHLORIDE, AND CALCIUM CHLORIDE 125 ML/HR: 600; 310; 30; 20 INJECTION, SOLUTION INTRAVENOUS at 18:35

## 2023-01-14 RX ADMIN — SODIUM CHLORIDE, SODIUM LACTATE, POTASSIUM CHLORIDE, AND CALCIUM CHLORIDE 125 ML/HR: 600; 310; 30; 20 INJECTION, SOLUTION INTRAVENOUS at 03:26

## 2023-01-14 RX ADMIN — LEVOTHYROXINE SODIUM 25 MCG: 25 TABLET ORAL at 12:22

## 2023-01-14 RX ADMIN — HYDROMORPHONE HYDROCHLORIDE 0.5 MG: 1 INJECTION, SOLUTION INTRAMUSCULAR; INTRAVENOUS; SUBCUTANEOUS at 14:34

## 2023-01-14 RX ADMIN — HYDROXYCHLOROQUINE SULFATE 200 MG: 200 TABLET ORAL at 12:23

## 2023-01-14 RX ADMIN — OXYCODONE HYDROCHLORIDE 10 MG: 10 TABLET ORAL at 08:12

## 2023-01-14 RX ADMIN — HYDROMORPHONE HYDROCHLORIDE 0.5 MG: 1 INJECTION, SOLUTION INTRAMUSCULAR; INTRAVENOUS; SUBCUTANEOUS at 18:41

## 2023-01-14 RX ADMIN — HEPARIN SODIUM 5000 UNITS: 5000 INJECTION INTRAVENOUS; SUBCUTANEOUS at 14:35

## 2023-01-14 RX ADMIN — ACETAMINOPHEN 650 MG: 325 TABLET, FILM COATED ORAL at 12:22

## 2023-01-14 RX ADMIN — ACETAMINOPHEN 650 MG: 325 TABLET, FILM COATED ORAL at 18:41

## 2023-01-14 RX ADMIN — ONDANSETRON 4 MG: 2 INJECTION INTRAMUSCULAR; INTRAVENOUS at 12:23

## 2023-01-14 RX ADMIN — POTASSIUM CHLORIDE 40 MEQ: 1500 TABLET, EXTENDED RELEASE ORAL at 00:08

## 2023-01-14 RX ADMIN — HEPARIN SODIUM 5000 UNITS: 5000 INJECTION INTRAVENOUS; SUBCUTANEOUS at 21:34

## 2023-01-14 NOTE — INCIDENTAL FINDINGS
The following findings require follow up:  Radiographic finding   Finding: Right ovarian speckled calcifications, raise concern for possible germ cell tumor   Follow up required: elective ultrasound    Follow up should be done within 3 month(s)    Please notify the following clinician to assist with the follow up:   Dr Gosia Butler, [address] [facility] Internal Medicine     [phone]

## 2023-01-14 NOTE — ED ATTENDING ATTESTATION
1/13/2023  IJamari MD, saw and evaluated the patient  I have discussed the patient with the resident/non-physician practitioner and agree with the resident's/non-physician practitioner's findings, Plan of Care, and MDM as documented in the resident's/non-physician practitioner's note, except where noted  All available labs and Radiology studies were reviewed  I was present for key portions of any procedure(s) performed by the resident/non-physician practitioner and I was immediately available to provide assistance  At this point I agree with the current assessment done in the Emergency Department  I have conducted an independent evaluation of this patient a history and physical is as follows: This is a evaluation of a 40-year-old female with past medical history of hypothyroidism as well as hyperlipidemia and recent diagnosis of rheumatoid arthritis started on Plaquenil who presents to the emergency department complaining of ongoing abdominal pain in his epigastric and right upper quadrant region  Patient states that symptoms have been ongoing for approximately 2 weeks but intensifying over the past few days  She notes that when she eats she also feels bloated and soon after has nausea with occasional vomiting  She denies any change in her stools and consistency or color  Has had decreased p o  intake secondary to symptoms  Seen by her PCP a few days ago for similar symptoms and advised supportive care concerning for gastritis and to return if anything changes  Symptoms have been ongoing so she presents at this time  On exam patient is uncomfortable appearing but nontoxic  Vital signs are currently stable  HEENT is normocephalic and atraumatic with moist mucous membranes  Sclera are anicteric  Neck is supple forage motion  Heart is regular rate without murmurs  Lungs are clear no wheezing rhonchi rales  Abdomen soft positive bowel sounds    Patient does have tenderness palpation of the right upper and epigastric regions  No rebound or guarding no palpable masses  No CVA tenderness palpation  No lower extremity edema no calf tenderness palpation  Intact distal pulses capillary for less than 2 seconds  Weekly oriented and appropriate  Assessment and plan right upper quadrant epigastric pain  Will evaluate for intra-abdominal pathology with blood work as well as imaging  Bedside ultrasound does show cholelithiasis  Will obtain formal imaging  Given patient's age and location of discomfort we will also obtain EKG and troponin  Will provide symptomatic control  Reevaluate and treat accordingly  ED Course       Reviewed records from 1/9/2023 when patient was seen at internal medicine office  PT with surgical evaluation pending at end of shift  Portions of the record may have been created with voice recognition software  Occasional wrong word or “sound-a-like" substitutions may have occurred due to the inherent limitations of voice recognition software  Review chart carefully and recognize, using context, where substitutions have occurred    Critical Care Time  Procedures

## 2023-01-14 NOTE — ED PROVIDER NOTES
History  Chief Complaint   Patient presents with   • Abdominal Pain     Patient states she has been having abdominal pain for approx 1 month but has progressively gotten worse over past 2 weeks  Patient c/o generalized abdominal pain, specifically after eating  Patient also reports vomiting     68-year-old female patient with history of hypothyroidism presenting with epigastric, left upper quadrant abdominal pain onset 1 month but worsening in the past 2 weeks  Patient states that she has pain whenever she eats  States she has had nausea vomiting every time she eats the past couple days  Denies taking any medications for the pain  Denies any fever, chest pain, shortness of breath, urinary symptoms, diarrhea  Patient states that she has family members that has had gallbladder disease in the past           Prior to Admission Medications   Prescriptions Last Dose Informant Patient Reported? Taking?    Glucosamine-Chondroitin (Osteo Bi-Flex Regular Strength) 250-200 MG TABS   Yes No   Sig: Take by mouth     acetaminophen (TYLENOL) 500 mg tablet   Yes No   Sig: Take 500 mg by mouth every 6 (six) hours as needed   atorvastatin (LIPITOR) 10 mg tablet   No No   Sig: Take 1 tablet (10 mg total) by mouth daily   Patient not taking: Reported on 11/3/2022   conjugated estrogens (Premarin) vaginal cream   No No   Sig: Insert 0 5 g into the vagina daily   hydroxychloroquine (PLAQUENIL) 200 mg tablet   No No   Sig: Take 1 tablet (200 mg total) by mouth daily with breakfast   levothyroxine (Euthyrox) 25 mcg tablet   No No   Sig: Take 1 tablet (25 mcg total) by mouth daily in the early morning   multivitamin (THERAGRAN) TABS   Yes No   Sig: Take 1 tablet by mouth daily   omeprazole (PriLOSEC) 40 MG capsule   No No   Sig: Take 1 capsule (40 mg total) by mouth daily      Facility-Administered Medications: None       Past Medical History:   Diagnosis Date   • Hypothyroidism    • Periodontal disease 02/07/2022       Past Surgical History:   Procedure Laterality Date   •  SECTION     • TUBAL LIGATION         Family History   Problem Relation Age of Onset   • Hypertension Mother    • Diabetes Father      I have reviewed and agree with the history as documented  E-Cigarette/Vaping   • E-Cigarette Use Never User      E-Cigarette/Vaping Substances   • Nicotine No    • THC No    • CBD No    • Flavoring No    • Other No    • Unknown No      Social History     Tobacco Use   • Smoking status: Never   • Smokeless tobacco: Never   Vaping Use   • Vaping Use: Never used   Substance Use Topics   • Alcohol use: Not Currently     Comment: social   • Drug use: Never        Review of Systems   Constitutional: Negative for fever  Gastrointestinal: Positive for abdominal pain, nausea and vomiting  All other systems reviewed and are negative  Physical Exam  ED Triage Vitals   Temperature Pulse Respirations Blood Pressure SpO2   23   98 2 °F (36 8 °C) 72 20 120/76 98 %      Temp Source Heart Rate Source Patient Position - Orthostatic VS BP Location FiO2 (%)   23 1934 23 1934 23 2350 23 2350 --   Oral Monitor Lying Right arm       Pain Score       234       8             Orthostatic Vital Signs  Vitals:    01/15/23 1457 01/15/23 1458 01/15/23 1500 01/15/23 1508   BP:   133/70 131/66   Pulse: 68 66 66 60   Patient Position - Orthostatic VS:           Physical Exam  Vitals reviewed  Constitutional:       Appearance: Normal appearance  HENT:      Head: Normocephalic and atraumatic  Nose: Nose normal       Mouth/Throat:      Mouth: Mucous membranes are moist       Pharynx: Oropharynx is clear  Eyes:      Extraocular Movements: Extraocular movements intact  Conjunctiva/sclera: Conjunctivae normal    Cardiovascular:      Rate and Rhythm: Normal rate and regular rhythm  Pulses: Normal pulses  Heart sounds: Normal heart sounds  Pulmonary:      Effort: Pulmonary effort is normal       Breath sounds: Normal breath sounds  Abdominal:      General: Bowel sounds are normal       Palpations: Abdomen is soft  Tenderness: There is abdominal tenderness in the epigastric area and left upper quadrant  Musculoskeletal:         General: Normal range of motion  Cervical back: Normal range of motion  Skin:     General: Skin is warm and dry  Neurological:      General: No focal deficit present  Mental Status: She is alert and oriented to person, place, and time  Mental status is at baseline           ED Medications  Medications   lactated ringers infusion (125 mL/hr Intravenous Continued from OR 1/15/23 1458)   ondansetron (ZOFRAN) injection 4 mg (4 mg Intravenous Given 1/14/23 1223)   heparin (porcine) subcutaneous injection 5,000 Units (5,000 Units Subcutaneous Not Given 1/15/23 1358)   oxyCODONE (ROXICODONE) IR tablet 5 mg (has no administration in time range)   oxyCODONE (ROXICODONE) immediate release tablet 10 mg (10 mg Oral Given 1/14/23 1735)   HYDROmorphone (DILAUDID) injection 0 5 mg (0 5 mg Intravenous Given 1/14/23 1841)   hydroxychloroquine (PLAQUENIL) tablet 200 mg (0 mg Oral Hold 1/15/23 0648)   levothyroxine tablet 25 mcg (0 mcg Oral Hold 1/15/23 0526)   pantoprazole (PROTONIX) EC tablet 40 mg (0 mg Oral Hold 1/15/23 0526)   acetaminophen (TYLENOL) tablet 650 mg (0 mg Oral Hold 1/15/23 1200)   ceFAZolin (ANCEF) IVPB (premix in dextrose) 2,000 mg 50 mL (has no administration in time range)   metroNIDAZOLE (FLAGYL) IVPB (premix) 500 mg 100 mL (has no administration in time range)   ondansetron (ZOFRAN) injection 4 mg (4 mg Intravenous Given 1/13/23 2034)   sodium chloride 0 9 % bolus 1,000 mL (0 mL Intravenous Stopped 1/14/23 0244)   morphine injection 2 mg (2 mg Intravenous Given 1/13/23 2130)   iohexol (OMNIPAQUE) 350 MG/ML injection (SINGLE-DOSE) 100 mL (100 mL Intravenous Given 1/13/23 2248)   potassium chloride (K-DUR,KLOR-CON) CR tablet 40 mEq (40 mEq Oral Given 1/14/23 0008)   potassium chloride (K-DUR,KLOR-CON) CR tablet 40 mEq (40 mEq Oral Given 1/14/23 0812)   potassium chloride 20 mEq IVPB (premix) (20 mEq Intravenous New Bag 1/15/23 1026)   indomethacin (INDOCIN) rectal suppository (100 mg Rectal Given 1/15/23 1425)   iohexol (OMNIPAQUE) 300 mg/mL injection (8 mL Other Given 1/15/23 1459)       Diagnostic Studies  Results Reviewed     Procedure Component Value Units Date/Time    Comprehensive metabolic panel [[id number]]  (Abnormal) Collected: 01/14/23 0520    Lab Status: Final result Specimen: Blood from Arm, Right Updated: 01/14/23 3497     Sodium 145 mmol/L      Potassium 3 5 mmol/L      Chloride 112 mmol/L      CO2 27 mmol/L      ANION GAP 6 mmol/L      BUN 7 mg/dL      Creatinine 0 51 mg/dL      Glucose 116 mg/dL      Calcium 8 8 mg/dL      Corrected Calcium 9 5 mg/dL       U/L       U/L      Alkaline Phosphatase 539 U/L      Total Protein 6 6 g/dL      Albumin 3 1 g/dL      Total Bilirubin 1 12 mg/dL      eGFR 106 ml/min/1 73sq m     Narrative:      [place] guidelines for Chronic Kidney Disease (CKD):   •  Stage 1 with normal or high GFR (GFR > 90 mL/min/1 73 square meters)  •  Stage 2 Mild CKD (GFR = 60-89 mL/min/1 73 square meters)  •  Stage 3A Moderate CKD (GFR = 45-59 mL/min/1 73 square meters)  •  Stage 3B Moderate CKD (GFR = 30-44 mL/min/1 73 square meters)  •  Stage 4 Severe CKD (GFR = 15-29 mL/min/1 73 square meters)  •  Stage 5 End Stage CKD (GFR <15 mL/min/1 73 square meters)  Note: GFR calculation is accurate only with a steady state creatinine    Magnesium [[id number]]  (Normal) Collected: 01/14/23 0520    Lab Status: Final result Specimen: Blood from Arm, Right Updated: 01/14/23 0633     Magnesium 2 2 mg/dL     Phosphorus [[id number]]  (Normal) Collected: 01/14/23 0520    Lab Status: Final result Specimen: Blood from Arm, Right Updated: 01/14/23 7064 Phosphorus 3 4 mg/dL     CBC and differential [[id number]]  (Abnormal) Collected: 01/14/23 0520    Lab Status: Final result Specimen: Blood from Arm, Right Updated: 01/14/23 0547     WBC 5 98 Thousand/uL      RBC 3 68 Million/uL      Hemoglobin 10 8 g/dL      Hematocrit 34 0 %      MCV 92 fL      MCH 29 3 pg      MCHC 31 8 g/dL      RDW 13 1 %      MPV 10 1 fL      Platelets 401 Thousands/uL      nRBC 0 /100 WBCs      Neutrophils Relative 65 %      Immat GRANS % 0 %      Lymphocytes Relative 25 %      Monocytes Relative 7 %      Eosinophils Relative 2 %      Basophils Relative 1 %      Neutrophils Absolute 3 91 Thousands/µL      Immature Grans Absolute 0 01 Thousand/uL      Lymphocytes Absolute 1 50 Thousands/µL      Monocytes Absolute 0 42 Thousand/µL      Eosinophils Absolute 0 10 Thousand/µL      Basophils Absolute 0 04 Thousands/µL     Platelet count [[id number]]     Lab Status: No result Specimen: Blood     HS Troponin I 2hr [[id number]]  (Normal) Collected: 01/14/23 0011    Lab Status: Final result Specimen: Blood from Arm, Right Updated: 01/14/23 0047     hs TnI 2hr 7 ng/L      Delta 2hr hsTnI 1 ng/L     HS Troponin 0hr (reflex protocol) [[id number]]  (Normal) Collected: 01/13/23 2157    Lab Status: Final result Specimen: Blood from Arm, Right Updated: 01/13/23 2232     hs TnI 0hr 6 ng/L     Comprehensive metabolic panel [[id number]]  (Abnormal) Collected: 01/13/23 2033    Lab Status: Final result Specimen: Blood from Arm, Right Updated: 01/13/23 2113     Sodium 141 mmol/L      Potassium 3 2 mmol/L      Chloride 107 mmol/L      CO2 27 mmol/L      ANION GAP 7 mmol/L      BUN 11 mg/dL      Creatinine 0 51 mg/dL      Glucose 123 mg/dL      Calcium 9 1 mg/dL       U/L       U/L      Alkaline Phosphatase 557 U/L      Total Protein 7 2 g/dL      Albumin 3 6 g/dL      Total Bilirubin 2 20 mg/dL      eGFR 106 ml/min/1 73sq m     Narrative:      [place] guidelines for Chronic Kidney Disease (CKD):   •  Stage 1 with normal or high GFR (GFR > 90 mL/min/1 73 square meters)  •  Stage 2 Mild CKD (GFR = 60-89 mL/min/1 73 square meters)  •  Stage 3A Moderate CKD (GFR = 45-59 mL/min/1 73 square meters)  •  Stage 3B Moderate CKD (GFR = 30-44 mL/min/1 73 square meters)  •  Stage 4 Severe CKD (GFR = 15-29 mL/min/1 73 square meters)  •  Stage 5 End Stage CKD (GFR <15 mL/min/1 73 square meters)  Note: GFR calculation is accurate only with a steady state creatinine    Lipase [[id number]]  (Normal) Collected: 01/13/23 2033    Lab Status: Final result Specimen: Blood from Arm, Right Updated: 01/13/23 2112     Lipase 201 u/L     CBC and differential [[id number]]  (Abnormal) Collected: 01/13/23 2033    Lab Status: Final result Specimen: Blood from Arm, Right Updated: 01/13/23 2049     WBC 6 92 Thousand/uL      RBC 3 64 Million/uL      Hemoglobin 10 7 g/dL      Hematocrit 32 9 %      MCV 90 fL      MCH 29 4 pg      MCHC 32 5 g/dL      RDW 13 2 %      MPV 9 8 fL      Platelets 790 Thousands/uL      nRBC 0 /100 WBCs      Neutrophils Relative 71 %      Immat GRANS % 0 %      Lymphocytes Relative 20 %      Monocytes Relative 7 %      Eosinophils Relative 1 %      Basophils Relative 1 %      Neutrophils Absolute 4 93 Thousands/µL      Immature Grans Absolute 0 02 Thousand/uL      Lymphocytes Absolute 1 35 Thousands/µL      Monocytes Absolute 0 49 Thousand/µL      Eosinophils Absolute 0 09 Thousand/µL      Basophils Absolute 0 04 Thousands/µL                  MRI abdomen wo contrast and mrcp   Final Result by Jeremias Amaro DO (01/14 1025)      1  Mild CBD dilatation secondary to 5-6 mm filling defect distal CBD consistent with choledocholithiasis  2  Distended gallbladder with cholelithiasis but no evidence for cholecystitis  3  Fibroid uterus  The study was marked in [facility] for immediate notification        Workstation performed: [id number]         CT abdomen pelvis with contrast   Final Result by Ramirez Shin MD (01/14 3983)      Myomatous uterus  Speckled calcifications are seen in the right ovary concerning for the possibility of a germ cell tumor  Recommend elective ultrasound for further evaluation  Workstation performed: [id number]         US right upper quadrant   Final Result by Ramirez Shin MD (01/13 3496)      Gallstones are present  Sonographic Pimentel sign was negative however patient was given analgesia  Recommend clinical correlation  There is hepatomegaly  Workstation performed: [id number]         FL ERCP biliary only    (Results Pending)         Procedures  POC Biliary US    Date/Time: 1/13/2023 11:00 PM  Performed by: Power Moss MD  Authorized by: Power Moss MD     Patient location:  ED  Performed by:  Resident  Procedure details:     Exam Type:  Diagnostic    Indications: upper right quadrant abdominal pain      Assessment for:  Cholelithiasis and cholecystitis    Views obtained: gallbladder (transverse and longitudinal)      Image quality: diagnostic      Image availability:  Images available in PACS  Findings:     Cholelithiasis: identified      Common bile duct:  Unable to visualize    Gallbladder wall:  Abnormal    Gallbladder wall thickened (>3 mm): yes      Pericholecystic fluid: not identified      Sonographic Pimentel's sign: negative    Interpretation:     Biliary ultrasound impressions: cholelithiasis            ED Course                                       Medical Decision Making  62 y/o female presenting with epigastric, LUQ pain, n/v  Patient tender to epigastric and LUQ tenderness  Labs show elevated lfts, mild anemia  Bedside RUQ us shows cholelithaisis  Concern for GB disease, possible cholecystitis  Pending CT abdomen pelvis, signed out to Dr Myron Griffith  Abdominal pain: acute illness or injury     Details: treated with morphine and zofran  improved  likely secondary to GB disease     Cholelithiasis: acute illness or injury     Details: Bedside US shows gb stones  pending formal RUQ US and CT scan  Elevated LFTs: acute illness or injury     Details: newly elevated LFTS, likely seocndary to GB disease  Amount and/or Complexity of Data Reviewed  Labs: ordered  Details: elevated lft  Radiology: ordered  Details: RUQ US bedside shows gb stones  mild wall thickeneing  no other signs of cholelcystitis  Discussion of management or test interpretation with external provider(s): Signed out to Dr Michael Mandel  Risk  Prescription drug management              Disposition  Final diagnoses:   Abdominal pain   Cholelithiasis   Elevated LFTs     Time reflects when diagnosis was documented in both MDM as applicable and the Disposition within this note     Time User Action Codes Description Comment    1/14/2023  2:40 AM Lo Solange Stallion Add [K80 21] Calculus of gallbladder with biliary obstruction but without cholecystitis     1/14/2023 10:53 AM Elfrieda Oliva Add [K80 50] Choledocholithiasis     1/14/2023 11:47 AM Elfrieda Oliva Add [K80 20] Symptomatic cholelithiasis     1/15/2023  3:44 PM Og Posey Add [R10 9] Abdominal pain     1/15/2023  3:45 PM Mary Dakota Seok Add [K80 20] Cholelithiasis     1/15/2023  3:45 PM Og Posey Add [R79 89] Elevated LFTs       ED Disposition     ED Disposition   Admit    Condition   Stable    Date/Time   Sun Teto 15, 2023  3:44 PM    Comment   Case was discussed with Dr Marli Adams and the patient's admission status was agreed to be inpatient service to the service of Dr Boni Peterson    None         Current Discharge Medication List      CONTINUE these medications which have NOT CHANGED    Details   acetaminophen (TYLENOL) 500 mg tablet Take 500 mg by mouth every 6 (six) hours as needed      atorvastatin (LIPITOR) 10 mg tablet Take 1 tablet (10 mg total) by mouth daily  Qty: 30 tablet, Refills: 2    Associated Diagnoses: Hyperlipidemia, unspecified hyperlipidemia type      conjugated estrogens (Premarin) vaginal cream Insert 0 5 g into the vagina daily  Qty: 30 g, Refills: 2    Associated Diagnoses: Vaginal atrophy      Glucosamine-Chondroitin (Osteo Bi-Flex Regular Strength) 250-200 MG TABS Take by mouth        hydroxychloroquine (PLAQUENIL) 200 mg tablet Take 1 tablet (200 mg total) by mouth daily with breakfast  Qty: 90 tablet, Refills: 2    Comments: Refill requests to Dr Gail Espinoza  Associated Diagnoses: Rheumatoid arthritis involving both hands with negative rheumatoid factor (HCC)      levothyroxine (Euthyrox) 25 mcg tablet Take 1 tablet (25 mcg total) by mouth daily in the early morning  Qty: 30 tablet, Refills: 5    Associated Diagnoses: Hypothyroidism, unspecified type      multivitamin (THERAGRAN) TABS Take 1 tablet by mouth daily      omeprazole (PriLOSEC) 40 MG capsule Take 1 capsule (40 mg total) by mouth daily  Qty: 14 capsule, Refills: 0    Associated Diagnoses: Stomach pain           No discharge procedures on file  PDMP Review     None           ED Provider  Attending physically available and evaluated [facility]  I managed the patient along with the ED Attending      Electronically Signed by         Cornelius Mac MD  01/15/23 6890

## 2023-01-14 NOTE — CONSULTS
CONSULT: GASTROENTEROLOGY          Inpatient consult to gastroenterology     Performed by  Rei Love MD     Authorized by Ana Kelly MD            PATIENT INFORMATION      Emily Carrasco [age] y o  female MRN: [id number]  [facility] Encounter: [id number]  PCP: Richie Johns DO  Date of Admission:  1/13/2023  Date of Consultation: 01/14/23  Requesting Physician: Jenn Mariee DO    [address] Teresa Alexander is a 61 y o  old female with PMH including GERD, hypothyroidism who presented with worsening right upper quadrant abdominal pain  Gastroenterology team has been consulted for assistance with management of choledocholithiasis  1   Choledocholithiasis  2  Cholelithiasis  3  Elevated transaminases  Patient presenting with chronic right upper quadrant abdominal pain, found to have cholelithiasis with concern for choledocholithiasis upon CAT scan  She did have elevated transaminases with T bili 2 2, ,  on admission  CT abdomen showing questionable choledocholithiasis  Initially there was a plan for laparoscopic cholecystectomy, MRCP was obtained which confirmed a filling defect in the CBD concerning for choledocholithiasis  Fortunately her LFTs have been downtrending, T bili from 2 2-1 1  Other LFTs stable  She is afebrile, does not have leukocytosis  Low suspicion for cholangitis at present  -- Okay for low-fat diet for now, plan for ERCP tentatively tomorrow versus next week  -- Discussed with the patient as well as primary team  -- Have low threshold to start antibiotics and initiate septic work-up if patient does develop fever, chills, encephalopathy, worsening leukocytosis  -- Continue to obtain CMP daily  -- We will need cholecystectomy  -- Outpatient GI follow-up    GI team will follow       HISTORY OF PRESENT ILLNESS      Adline Duane is a 61 y o  female who is originally admitted for chronic right upper quadrant abdominal pain on 2023  GI team is consulted for choledocholithiasis  60 yo F presenting with worsening chronic right upper quadrant abdominal pain  During my encounter reports improvement in pain, denies any significant fever, confusion, worsening pain, nausea, vomiting, diarrhea, constipation, unintentional weight changes, blood in stool, melena  He was found to have elevated LFTs, imaging concerning for cholelithiasis with choledocholithiasis  Denies any family history concerning for gallbladder related disorders, smoking, aggressive alcohol use  REVIEW OF SYSTEMS     A thorough 12-point review of systems has been conducted  Pertinent positives and negatives are mentioned in the history of present illness  PAST MEDICAL & SURGICAL HISTORY      Past Medical History:   Diagnosis Date   • Hypothyroidism    • Periodontal disease 2022       Past Surgical History:   Procedure Laterality Date   •  SECTION     • TUBAL LIGATION         MEDICATIONS & ALLERGIES       Medications:   Prior to Admission medications    Medication Sig Start Date End Date Taking?  Authorizing Provider   acetaminophen (TYLENOL) 500 mg tablet Take 500 mg by mouth every 6 (six) hours as needed    Historical Provider, MD   atorvastatin (LIPITOR) 10 mg tablet Take 1 tablet (10 mg total) by mouth daily  Patient not taking: Reported on 11/3/2022 4/22/22   Nehal Fraser DO   conjugated estrogens (Premarin) vaginal cream Insert 0 5 g into the vagina daily 21   Tawanda Gomez MD   Glucosamine-Chondroitin (Osteo Bi-Flex Regular Strength) 250-200 MG TABS Take by mouth      Historical Provider, MD   hydroxychloroquine (PLAQUENIL) 200 mg tablet Take 1 tablet (200 mg total) by mouth daily with breakfast 22  Christi Barrera DO   levothyroxine (Euthyrox) 25 mcg tablet Take 1 tablet (25 mcg total) by mouth daily in the early morning 22   Nehal Fraser DO multivitamin (THERAGRAN) TABS Take 1 tablet by mouth daily    Historical Provider, MD   omeprazole (PriLOSEC) 40 MG capsule Take 1 capsule (40 mg total) by mouth daily 1/9/23   Rory Chatterjee DO       Allergies: No Known Allergies    SOCIAL HISTORY      Substance Use History:   Social History     Substance and Sexual Activity   Alcohol Use Not Currently    Comment: social     Social History     Tobacco Use   Smoking Status Never   Smokeless Tobacco Never     Social History     Substance and Sexual Activity   Drug Use Never       FAMILY HISTORY      As in the HPI  PHYSICAL EXAM     Vitals:   Blood Pressure: 113/58 (01/14/23 0715)  Pulse: 58 (01/14/23 0715)  Temperature: 98 8 °F (37 1 °C) (01/14/23 0715)  Temp Source: Oral (01/13/23 1934)  Respirations: 18 (01/14/23 0715)  SpO2: 96 % (01/14/23 0715)    Physical Exam:   GENERAL: NAD  HEENT:  NC/AT, MMM  CARDIAC:  RRR, +S1/S2, no S3/S4 heard  PULMONARY:  CTA B/L, no wheezing/rales/rhonci, non-labored breathing  ABDOMEN: non tender  RECTAL:  Normal appearing stool  NEUROLOGIC:  Alert/oriented x3  EXTREMITIES: No swelling  SKIN:  No rashes or erythema     ADDITIONAL DATA     Lab Results:     Results from last 7 days   Lab Units 01/14/23  0520   WBC Thousand/uL 5 98   HEMOGLOBIN g/dL 10 8*   HEMATOCRIT % 34 0*   PLATELETS Thousands/uL 309   NEUTROS PCT % 65   LYMPHS PCT % 25   MONOS PCT % 7   EOS PCT % 2     Results from last 7 days   Lab Units 01/14/23  0520   POTASSIUM mmol/L 3 5   CHLORIDE mmol/L 112*   CO2 mmol/L 27   BUN mg/dL 7   CREATININE mg/dL 0 51*   CALCIUM mg/dL 8 8   ALK PHOS U/L 539*   ALT U/L 361*   AST U/L 238*           Imaging:    MRI abdomen wo contrast and mrcp    Result Date: 1/14/2023  Narrative: MRI OF THE ABDOMEN WITHOUT CONTRAST WITH MRCP INDICATION: Left upper quadrant pain  History of gallstones  Suspected choledocholithiasis   COMPARISON: CT abdomen and pelvis 1/13/2023 TECHNIQUE:  Multiplanar/multisequence MRI of the abdomen with 3D MRCP was performed without the administration of contrast  FINDINGS: Evaluation of the solid organs is limited without IV contrast  LOWER CHEST:   Mild dependent hypoventilatory changes  LIVER: Normal in size and configuration  No suspicious mass  Limited evaluation of hepatic veins and portal veins on this non-contrast MRI is unremarkable  BILE DUCTS:  No intrahepatic bile duct dilation  Common bile duct is mildly distended at 7-8 mm  5-6 mm filling defect in the distal CBD consistent with choledocholithiasis  No discernible biliary stricture or suspicious mass  GALLBLADDER:  The gallbladder is mildly distended with numerous small gallstones  No pericholecystic edema to suggest cholecystitis  PANCREAS:  Unremarkable  The pancreatic duct is normal caliber without evidence of pancreatic divisum  ADRENAL GLANDS:  Normal  SPLEEN:  Normal  KIDNEYS/PROXIMAL URETERS:  No hydroureteronephrosis  Probable tiny cysts lower poles of both kidneys  BOWEL:  No dilated loops of bowel  PERITONEAL CAVITY/RETROPERITONEUM:  Trace pelvic free fluid  Multiple uterine fibroids noted including a suspected 3 cm subserosal right uterine body fibroid  LYMPH NODES:  No enlarged lymphadenopathy  VASCULAR STRUCTURES:  Unremarkable  No aneurysm  ABDOMINAL WALL:  Unremarkable  OSSEOUS STRUCTURES:  No suspicious osseous lesion  Impression: 1  Mild CBD dilatation secondary to 5-6 mm filling defect distal CBD consistent with choledocholithiasis  2  Distended gallbladder with cholelithiasis but no evidence for cholecystitis  3  Fibroid uterus  The study was marked in [facility] for immediate notification  Workstation performed: [id number]     US right upper quadrant    Result Date: 1/13/2023  Narrative: RIGHT UPPER QUADRANT ULTRASOUND INDICATION:     r/o cholecystitis  COMPARISON:  None TECHNIQUE:   Real-time ultrasound of the right upper quadrant was performed with a curvilinear transducer with both volumetric sweeps and still imaging techniques  FINDINGS: PANCREAS:  Visualized portions of the pancreas are within normal limits  Body and tail the pancreas are not visualized  AORTA AND IVC:  Visualized portions are normal for patient age  LIVER: Size:  Moderately enlarged  The liver measures 20 9 cm in the midclavicular line  Contour:  Surface contour is smooth  Parenchyma:  Echogenicity and echotexture are within normal limits  No liver mass identified  Limited imaging of the main portal vein shows it to be patent and hepatopetal   BILIARY: The gallbladder is at the upper limits of normal in wall thickness measuring 3 mm  No wall thickening or pericholecystic fluid  There are multiple dependent calculi without sludge  No sonographic Pimentel sign however patient was given analgesia    No intrahepatic biliary dilatation  CBD measures 4 0 mm  No choledocholithiasis  KIDNEY: Right kidney measures 11 5 x 5 4 x 5 6 cm  Volume 181 7 mL Kidney within normal limits  ASCITES:   None  Impression: Gallstones are present  Sonographic Pimentel sign was negative however patient was given analgesia  Recommend clinical correlation  There is hepatomegaly  Workstation performed: [id number]     CT abdomen pelvis with contrast    Result Date: 1/14/2023  Narrative: CT ABDOMEN AND PELVIS WITH IV CONTRAST INDICATION:   LUQ abdominal pain epigastric, LUQ abd pain  COMPARISON:  6/9/2007  TECHNIQUE:  CT examination of the abdomen and pelvis was performed  Axial, sagittal, and coronal 2D reformatted images were created from the source data and submitted for interpretation  Radiation dose length product (DLP) for this visit:  290 92 mGy-cm   This examination, like all CT scans performed in the [facility], was performed utilizing techniques to minimize radiation dose exposure, including the use of iterative  reconstruction and automated exposure control  IV Contrast:  100 mL of iohexol (OMNIPAQUE) Enteric Contrast:  Enteric contrast was not administered   FINDINGS: ABDOMEN LOWER CHEST:  Right middle lobe subcentimeter granuloma  There is a small hiatal hernia  Bibasilar subsegmental atelectatic changes  Otherwise unremarkable lung bases  LIVER/BILIARY TREE:  Liver is diffusely decreased in density consistent with fatty change  No CT evidence of suspicious hepatic mass  Normal hepatic contours  No biliary dilatation  GALLBLADDER:  There are gallstone(s) within the gallbladder, without pericholecystic inflammatory changes  SPLEEN:  Unremarkable  PANCREAS:  Unremarkable  ADRENAL GLANDS:  Unremarkable  KIDNEYS/URETERS:  Unremarkable  No hydronephrosis  STOMACH AND BOWEL:  Unremarkable  APPENDIX:  A normal appendix was visualized  ABDOMINOPELVIC CAVITY:  No ascites  No pneumoperitoneum  No lymphadenopathy  VESSELS:  Unremarkable for patient's age  PELVIS REPRODUCTIVE ORGANS:  Myomatous uterus  Speckled calcifications are seen in the right ovary concerning for the possibility of a germ cell tumor  Recommend elective ultrasound for further evaluation  URINARY BLADDER:  Unremarkable  ABDOMINAL WALL/INGUINAL REGIONS:  Unremarkable  OSSEOUS STRUCTURES:  No acute fracture or destructive osseous lesion  Impression: Myomatous uterus  Speckled calcifications are seen in the right ovary concerning for the possibility of a germ cell tumor  Recommend elective ultrasound for further evaluation  Workstation performed: [id number]       EKG, Pathology, and Other Studies Reviewed on Admission:   · EKG: Reviewed    Counseling / Coordination of Care Time: 30 total mins spent n consult  Greater than 50% of total time spent on patient counseling and coordination of care  Rema Carmona MD   PGY-4, Department of Gastroenterology     ** Please Note: This note is constructed using a voice recognition dictation system   **

## 2023-01-14 NOTE — H&P
H&P Exam - [facility] Jose Morrison 61 y o  female MRN: [id number]  Unit/Bed#: ED 17 Encounter: [id number]    Assessment/Plan     Assessment:  61 F p/w acute on chronic RUQ pain, found to have cholelithiasis with c/f choledocholithiasis  AVSS, room air     Abdomen:     Labs:   K 3 2  Tbili 2 2        RUQ US 3mm Gb wall, cholelithiasis, no pericholecystic fluid, CBD 4mm    CTAP: cholelithiasis, incidental R ovarian calcifications  Rec/d elective US         Plan:  -Surgical Admission  -maintain NPO  -trend LFTS  -pain/nausea control   -evaluation with MRCP for choledocholithiasis  -will require duct clearance if MRCP showing choledocholithiasis  -plan for laparoscopic cholecystectomy this admission  DVt PPx    History of Present Illness   History, ROS and PFSH unobtainable from any source due to n/a  HPI:  Watson Manuel is a 61 y o  female who presents with a two week history of right upper quadrant pain with intermittent nausea  Upon further questioning, she reports colicky symptoms after meals  She denies fever, chills, and vomiting  She notes no urinary or bowel habit changes  She has a prior hx of tubal ligations and  section  She has previously been made aware of her gallstones by a healthcare provider  Review of Systems   Constitutional: Positive for appetite change  Negative for chills and fever  Gastrointestinal: Positive for abdominal pain and nausea  All other systems reviewed and are negative        Historical Information   Past Medical History:   Diagnosis Date   • Hypothyroidism    • Periodontal disease 2022     Past Surgical History:   Procedure Laterality Date   •  SECTION     • TUBAL LIGATION       Social History   Social History     Substance and Sexual Activity   Alcohol Use Not Currently    Comment: social     Social History     Substance and Sexual Activity   Drug Use Never     Social History     Tobacco Use Smoking Status Never   Smokeless Tobacco Never     E-Cigarette/Vaping   • E-Cigarette Use Never User      E-Cigarette/Vaping Substances   • Nicotine No    • THC No    • CBD No    • Flavoring No    • Other No    • Unknown No      Family History:   Family History   Problem Relation Age of Onset   • Hypertension Mother    • Diabetes Father        Meds/Allergies   all medications and allergies reviewed  No Known Allergies    Objective   First Vitals:   Blood Pressure: 120/76 (01/13/23 1934)  Pulse: 72 (01/13/23 1934)  Temperature: 98 2 °F (36 8 °C) (01/13/23 1934)  Temp Source: Oral (01/13/23 1934)  Respirations: 20 (01/13/23 1934)  SpO2: 98 % (01/13/23 1934)    Current Vitals:   Blood Pressure: 120/66 (01/13/23 2350)  Pulse: 70 (01/13/23 2350)  Temperature: 98 2 °F (36 8 °C) (01/13/23 1934)  Temp Source: Oral (01/13/23 1934)  Respirations: 18 (01/13/23 2350)  SpO2: 96 % (01/13/23 2350)    No intake or output data in the 24 hours ending 01/14/23 0159    Invasive Devices     Peripheral Intravenous Line  Duration           Peripheral IV 01/13/23 Right Antecubital <1 day                Physical Exam  Vitals reviewed  Constitutional:       General: She is not in acute distress  HENT:      Head: Normocephalic and atraumatic  Mouth/Throat:      Mouth: Mucous membranes are moist    Eyes:      General: No scleral icterus  Cardiovascular:      Rate and Rhythm: Normal rate and regular rhythm  Pulmonary:      Effort: Pulmonary effort is normal  No respiratory distress  Abdominal:      General: A surgical scar is present  There is no distension  Palpations: Abdomen is soft  Tenderness: There is abdominal tenderness  There is no guarding or rebound  Negative signs include Pimentel's sign  Musculoskeletal:         General: No swelling, tenderness or deformity  Cervical back: No rigidity or tenderness  Lymphadenopathy:      Cervical: No cervical adenopathy  Skin:     General: Skin is warm and dry  Capillary Refill: Capillary refill takes 2 to 3 seconds  Coloration: Skin is not jaundiced  Neurological:      General: No focal deficit present  Mental Status: She is alert  Mental status is at baseline  Psychiatric:         Mood and Affect: Mood normal          Behavior: Behavior normal          Lab Results:   I have personally reviewed pertinent lab results  , CBC:   Lab Results   Component Value Date    WBC 6 92 01/13/2023    HGB 10 7 (L) 01/13/2023    HCT 32 9 (L) 01/13/2023    MCV 90 01/13/2023     01/13/2023    MCH 29 4 01/13/2023    MCHC 32 5 01/13/2023    RDW 13 2 01/13/2023    MPV 9 8 01/13/2023    NRBC 0 01/13/2023   , CMP:   Lab Results   Component Value Date    SODIUM 141 01/13/2023    K 3 2 (L) 01/13/2023     01/13/2023    CO2 27 01/13/2023    BUN 11 01/13/2023    CREATININE 0 51 (L) 01/13/2023    CALCIUM 9 1 01/13/2023     (H) 01/13/2023     (H) 01/13/2023    ALKPHOS 557 (H) 01/13/2023    EGFR 106 01/13/2023   , Coagulation: No results found for: PT, INR, APTT, Urinalysis: No results found for: Herschell Ricks, SPECGRAV, PHUR, LEUKOCYTESUR, NITRITE, PROTEINUA, GLUCOSEU, KETONESU, BILIRUBINUR, BLOODU  Imaging: I have personally reviewed pertinent reports  and I have personally reviewed pertinent films in PACS  EKG, Pathology, and Other Studies: I have personally reviewed pertinent reports  and I have personally reviewed pertinent films in PACS      US right upper quadrant    Result Date: 1/13/2023  Impression: Gallstones are present  Sonographic Pimentel sign was negative however patient was given analgesia  Recommend clinical correlation  There is hepatomegaly  Workstation performed: [id number]     CT abdomen pelvis with contrast    Result Date: 1/14/2023  Impression: Myomatous uterus  Speckled calcifications are seen in the right ovary concerning for the possibility of a germ cell tumor  Recommend elective ultrasound for further evaluation   Workstation performed: [id number]     Code Status: No Order  Advance Directive and Living Will:      Power of :    POLST:

## 2023-01-14 NOTE — PLAN OF CARE
Problem: PAIN - ADULT  Goal: Verbalizes/displays adequate comfort level or baseline comfort level  Description: Interventions:  - Encourage patient to monitor pain and request assistance  - Assess pain using appropriate pain scale  - Administer analgesics based on type and severity of pain and evaluate response  - Implement non-pharmacological measures as appropriate and evaluate response  - Consider cultural and social influences on pain and pain management  - Notify physician/advanced practitioner if interventions unsuccessful or patient reports new pain  Outcome: Progressing     Problem: INFECTION - ADULT  Goal: Absence or prevention of progression during hospitalization  Description: INTERVENTIONS:  - Assess and monitor for signs and symptoms of infection  - Monitor lab/diagnostic results  - Monitor all insertion sites, i e  indwelling lines, tubes, and drains  - Monitor endotracheal if appropriate and nasal secretions for changes in amount and color  - [place] appropriate cooling/warming therapies per order  - Administer medications as ordered  - Instruct and encourage patient and family to use good hand hygiene technique  - Identify and instruct in appropriate isolation precautions for identified infection/condition  Outcome: Progressing     Problem: SAFETY ADULT  Goal: Patient will remain free of falls  Description: INTERVENTIONS:  - Educate patient/family on patient safety including physical limitations  - Instruct patient to call for assistance with activity   - Consult OT/PT to assist with strengthening/mobility   - Keep Call bell within reach  - Keep bed low and locked with side rails adjusted as appropriate  - Keep care items and personal belongings within reach  - Initiate and maintain comfort rounds  - Make Fall Risk Sign visible to staff  - Apply yellow socks and bracelet for high fall risk patients  - Consider moving patient to room near nurses station  Outcome: Progressing     Problem: DISCHARGE PLANNING  Goal: Discharge to home or other facility with appropriate resources  Description: INTERVENTIONS:  - Identify barriers to discharge w/patient and caregiver  - Arrange for needed discharge resources and transportation as appropriate  - Identify discharge learning needs (meds, wound care, etc )  - Arrange for interpretive services to assist at discharge as needed  - Refer to Case Management Department for coordinating discharge planning if the patient needs post-hospital services based on physician/advanced practitioner order or complex needs related to functional status, cognitive ability, or social support system  Outcome: Progressing

## 2023-01-15 ENCOUNTER — APPOINTMENT (INPATIENT)
Dept: RADIOLOGY | Facility: HOSPITAL | Age: 58
End: 2023-01-15

## 2023-01-15 ENCOUNTER — ANESTHESIA (INPATIENT)
Dept: PERIOP | Facility: HOSPITAL | Age: 58
End: 2023-01-15

## 2023-01-15 ENCOUNTER — ANESTHESIA EVENT (INPATIENT)
Dept: PERIOP | Facility: HOSPITAL | Age: 58
End: 2023-01-15

## 2023-01-15 ENCOUNTER — APPOINTMENT (OUTPATIENT)
Dept: PERIOP | Facility: HOSPITAL | Age: 58
End: 2023-01-15

## 2023-01-15 LAB
ALBUMIN SERPL BCP-MCNC: 3.1 G/DL (ref 3.5–5)
ALP SERPL-CCNC: 451 U/L (ref 46–116)
ALT SERPL W P-5'-P-CCNC: 256 U/L (ref 12–78)
ANION GAP SERPL CALCULATED.3IONS-SCNC: 7 MMOL/L (ref 4–13)
AST SERPL W P-5'-P-CCNC: 96 U/L (ref 5–45)
BASOPHILS # BLD AUTO: 0.05 THOUSANDS/ÂΜL (ref 0–0.1)
BASOPHILS NFR BLD AUTO: 1 % (ref 0–1)
BILIRUB SERPL-MCNC: 1.17 MG/DL (ref 0.2–1)
BUN SERPL-MCNC: 7 MG/DL (ref 5–25)
CALCIUM ALBUM COR SERPL-MCNC: 9.6 MG/DL (ref 8.3–10.1)
CALCIUM SERPL-MCNC: 8.9 MG/DL (ref 8.3–10.1)
CHLORIDE SERPL-SCNC: 108 MMOL/L (ref 96–108)
CO2 SERPL-SCNC: 28 MMOL/L (ref 21–32)
CREAT SERPL-MCNC: 0.47 MG/DL (ref 0.6–1.3)
EOSINOPHIL # BLD AUTO: 0.07 THOUSAND/ÂΜL (ref 0–0.61)
EOSINOPHIL NFR BLD AUTO: 1 % (ref 0–6)
ERYTHROCYTE [DISTWIDTH] IN BLOOD BY AUTOMATED COUNT: 13.1 % (ref 11.6–15.1)
GFR SERPL CREATININE-BSD FRML MDRD: 109 ML/MIN/1.73SQ M
GLUCOSE SERPL-MCNC: 99 MG/DL (ref 65–140)
HCT VFR BLD AUTO: 33 % (ref 34.8–46.1)
HGB BLD-MCNC: 10.6 G/DL (ref 11.5–15.4)
IMM GRANULOCYTES # BLD AUTO: 0.01 THOUSAND/UL (ref 0–0.2)
IMM GRANULOCYTES NFR BLD AUTO: 0 % (ref 0–2)
LYMPHOCYTES # BLD AUTO: 1.82 THOUSANDS/ÂΜL (ref 0.6–4.47)
LYMPHOCYTES NFR BLD AUTO: 33 % (ref 14–44)
MCH RBC QN AUTO: 29.1 PG (ref 26.8–34.3)
MCHC RBC AUTO-ENTMCNC: 32.1 G/DL (ref 31.4–37.4)
MCV RBC AUTO: 91 FL (ref 82–98)
MONOCYTES # BLD AUTO: 0.3 THOUSAND/ÂΜL (ref 0.17–1.22)
MONOCYTES NFR BLD AUTO: 6 % (ref 4–12)
NEUTROPHILS # BLD AUTO: 3.24 THOUSANDS/ÂΜL (ref 1.85–7.62)
NEUTS SEG NFR BLD AUTO: 59 % (ref 43–75)
NRBC BLD AUTO-RTO: 0 /100 WBCS
PLATELET # BLD AUTO: 298 THOUSANDS/UL (ref 149–390)
PMV BLD AUTO: 10.1 FL (ref 8.9–12.7)
POTASSIUM SERPL-SCNC: 3.5 MMOL/L (ref 3.5–5.3)
PROT SERPL-MCNC: 6.5 G/DL (ref 6.4–8.4)
RBC # BLD AUTO: 3.64 MILLION/UL (ref 3.81–5.12)
SODIUM SERPL-SCNC: 143 MMOL/L (ref 135–147)
WBC # BLD AUTO: 5.49 THOUSAND/UL (ref 4.31–10.16)

## 2023-01-15 PROCEDURE — BF101ZZ FLUOROSCOPY OF BILE DUCTS USING LOW OSMOLAR CONTRAST: ICD-10-PCS | Performed by: RADIOLOGY

## 2023-01-15 PROCEDURE — 0FC98ZZ EXTIRPATION OF MATTER FROM COMMON BILE DUCT, VIA NATURAL OR ARTIFICIAL OPENING ENDOSCOPIC: ICD-10-PCS | Performed by: RADIOLOGY

## 2023-01-15 PROCEDURE — 0F798DZ DILATION OF COMMON BILE DUCT WITH INTRALUMINAL DEVICE, VIA NATURAL OR ARTIFICIAL OPENING ENDOSCOPIC: ICD-10-PCS | Performed by: RADIOLOGY

## 2023-01-15 RX ORDER — METOCLOPRAMIDE HYDROCHLORIDE 5 MG/ML
10 INJECTION INTRAMUSCULAR; INTRAVENOUS ONCE AS NEEDED
Status: DISCONTINUED | OUTPATIENT
Start: 2023-01-15 | End: 2023-01-15 | Stop reason: HOSPADM

## 2023-01-15 RX ORDER — LIDOCAINE HYDROCHLORIDE 10 MG/ML
INJECTION, SOLUTION EPIDURAL; INFILTRATION; INTRACAUDAL; PERINEURAL AS NEEDED
Status: DISCONTINUED | OUTPATIENT
Start: 2023-01-15 | End: 2023-01-15

## 2023-01-15 RX ORDER — ROCURONIUM BROMIDE 10 MG/ML
INJECTION, SOLUTION INTRAVENOUS AS NEEDED
Status: DISCONTINUED | OUTPATIENT
Start: 2023-01-15 | End: 2023-01-15

## 2023-01-15 RX ORDER — POTASSIUM CHLORIDE 14.9 MG/ML
20 INJECTION INTRAVENOUS
Status: COMPLETED | OUTPATIENT
Start: 2023-01-15 | End: 2023-01-15

## 2023-01-15 RX ORDER — CEFAZOLIN SODIUM 2 G/50ML
2000 SOLUTION INTRAVENOUS
Status: COMPLETED | OUTPATIENT
Start: 2023-01-16 | End: 2023-01-16

## 2023-01-15 RX ORDER — DIPHENHYDRAMINE HYDROCHLORIDE 50 MG/ML
12.5 INJECTION INTRAMUSCULAR; INTRAVENOUS ONCE AS NEEDED
Status: DISCONTINUED | OUTPATIENT
Start: 2023-01-15 | End: 2023-01-15 | Stop reason: HOSPADM

## 2023-01-15 RX ORDER — SODIUM CHLORIDE, SODIUM LACTATE, POTASSIUM CHLORIDE, CALCIUM CHLORIDE 600; 310; 30; 20 MG/100ML; MG/100ML; MG/100ML; MG/100ML
INJECTION, SOLUTION INTRAVENOUS CONTINUOUS PRN
Status: DISCONTINUED | OUTPATIENT
Start: 2023-01-15 | End: 2023-01-15

## 2023-01-15 RX ORDER — METRONIDAZOLE 500 MG/100ML
500 INJECTION, SOLUTION INTRAVENOUS
Status: COMPLETED | OUTPATIENT
Start: 2023-01-16 | End: 2023-01-16

## 2023-01-15 RX ORDER — PROPOFOL 10 MG/ML
INJECTION, EMULSION INTRAVENOUS AS NEEDED
Status: DISCONTINUED | OUTPATIENT
Start: 2023-01-15 | End: 2023-01-15

## 2023-01-15 RX ORDER — ONDANSETRON 2 MG/ML
INJECTION INTRAMUSCULAR; INTRAVENOUS AS NEEDED
Status: DISCONTINUED | OUTPATIENT
Start: 2023-01-15 | End: 2023-01-15

## 2023-01-15 RX ORDER — EPHEDRINE SULFATE 50 MG/ML
INJECTION INTRAVENOUS AS NEEDED
Status: DISCONTINUED | OUTPATIENT
Start: 2023-01-15 | End: 2023-01-15

## 2023-01-15 RX ORDER — DEXAMETHASONE SODIUM PHOSPHATE 10 MG/ML
INJECTION, SOLUTION INTRAMUSCULAR; INTRAVENOUS AS NEEDED
Status: DISCONTINUED | OUTPATIENT
Start: 2023-01-15 | End: 2023-01-15

## 2023-01-15 RX ORDER — SUCCINYLCHOLINE/SOD CL,ISO/PF 100 MG/5ML
SYRINGE (ML) INTRAVENOUS AS NEEDED
Status: DISCONTINUED | OUTPATIENT
Start: 2023-01-15 | End: 2023-01-15

## 2023-01-15 RX ORDER — FENTANYL CITRATE/PF 50 MCG/ML
25 SYRINGE (ML) INJECTION
Status: DISCONTINUED | OUTPATIENT
Start: 2023-01-15 | End: 2023-01-15 | Stop reason: HOSPADM

## 2023-01-15 RX ADMIN — SODIUM CHLORIDE, SODIUM LACTATE, POTASSIUM CHLORIDE, AND CALCIUM CHLORIDE: .6; .31; .03; .02 INJECTION, SOLUTION INTRAVENOUS at 14:05

## 2023-01-15 RX ADMIN — HEPARIN SODIUM 5000 UNITS: 5000 INJECTION INTRAVENOUS; SUBCUTANEOUS at 05:27

## 2023-01-15 RX ADMIN — HEPARIN SODIUM 5000 UNITS: 5000 INJECTION INTRAVENOUS; SUBCUTANEOUS at 21:24

## 2023-01-15 RX ADMIN — EPHEDRINE SULFATE 5 MG: 50 INJECTION INTRAVENOUS at 14:32

## 2023-01-15 RX ADMIN — SODIUM CHLORIDE, SODIUM LACTATE, POTASSIUM CHLORIDE, AND CALCIUM CHLORIDE 125 ML/HR: 600; 310; 30; 20 INJECTION, SOLUTION INTRAVENOUS at 01:42

## 2023-01-15 RX ADMIN — ROCURONIUM BROMIDE 20 MG: 10 INJECTION, SOLUTION INTRAVENOUS at 14:15

## 2023-01-15 RX ADMIN — ONDANSETRON 4 MG: 2 INJECTION INTRAMUSCULAR; INTRAVENOUS at 14:14

## 2023-01-15 RX ADMIN — PROPOFOL 200 MG: 10 INJECTION, EMULSION INTRAVENOUS at 14:09

## 2023-01-15 RX ADMIN — SODIUM CHLORIDE, SODIUM LACTATE, POTASSIUM CHLORIDE, AND CALCIUM CHLORIDE 125 ML/HR: 600; 310; 30; 20 INJECTION, SOLUTION INTRAVENOUS at 20:17

## 2023-01-15 RX ADMIN — POTASSIUM CHLORIDE 20 MEQ: 14.9 INJECTION, SOLUTION INTRAVENOUS at 10:26

## 2023-01-15 RX ADMIN — SUGAMMADEX 200 MG: 100 INJECTION, SOLUTION INTRAVENOUS at 14:45

## 2023-01-15 RX ADMIN — SODIUM CHLORIDE, SODIUM LACTATE, POTASSIUM CHLORIDE, AND CALCIUM CHLORIDE 125 ML/HR: 600; 310; 30; 20 INJECTION, SOLUTION INTRAVENOUS at 08:29

## 2023-01-15 RX ADMIN — SODIUM CHLORIDE, SODIUM LACTATE, POTASSIUM CHLORIDE, AND CALCIUM CHLORIDE: .6; .31; .03; .02 INJECTION, SOLUTION INTRAVENOUS at 14:26

## 2023-01-15 RX ADMIN — LIDOCAINE HYDROCHLORIDE 50 MG: 10 INJECTION, SOLUTION EPIDURAL; INFILTRATION; INTRACAUDAL; PERINEURAL at 14:09

## 2023-01-15 RX ADMIN — Medication 100 MG: at 14:09

## 2023-01-15 RX ADMIN — IOHEXOL 8 ML: 300 INJECTION, SOLUTION INTRAVENOUS at 14:59

## 2023-01-15 RX ADMIN — DEXAMETHASONE SODIUM PHOSPHATE 10 MG: 10 INJECTION, SOLUTION INTRAMUSCULAR; INTRAVENOUS at 14:14

## 2023-01-15 RX ADMIN — POTASSIUM CHLORIDE 20 MEQ: 14.9 INJECTION, SOLUTION INTRAVENOUS at 08:29

## 2023-01-15 NOTE — PLAN OF CARE
Problem: PAIN - ADULT  Goal: Verbalizes/displays adequate comfort level or baseline comfort level  Description: Interventions:  - Encourage patient to monitor pain and request assistance  - Assess pain using appropriate pain scale  - Administer analgesics based on type and severity of pain and evaluate response  - Implement non-pharmacological measures as appropriate and evaluate response  - Consider cultural and social influences on pain and pain management  - Notify physician/advanced practitioner if interventions unsuccessful or patient reports new pain  Outcome: Progressing     Problem: INFECTION - ADULT  Goal: Absence or prevention of progression during hospitalization  Description: INTERVENTIONS:  - Assess and monitor for signs and symptoms of infection  - Monitor lab/diagnostic results  - Monitor all insertion sites, i e  indwelling lines, tubes, and drains  - Monitor endotracheal if appropriate and nasal secretions for changes in amount and color  - [place] appropriate cooling/warming therapies per order  - Administer medications as ordered  - Instruct and encourage patient and family to use good hand hygiene technique  - Identify and instruct in appropriate isolation precautions for identified infection/condition  Outcome: Progressing     Problem: SAFETY ADULT  Goal: Patient will remain free of falls  Description: INTERVENTIONS:  - Educate patient/family on patient safety including physical limitations  - Instruct patient to call for assistance with activity   - Consult OT/PT to assist with strengthening/mobility   - Keep Call bell within reach  - Keep bed low and locked with side rails adjusted as appropriate  - Keep care items and personal belongings within reach  - Initiate and maintain comfort rounds  - Make Fall Risk Sign visible to staff  - Obtain necessary fall risk management equipment:   - Apply yellow socks and bracelet for high fall risk patients  - Consider moving patient to room near nurses station  Outcome: Progressing     Problem: DISCHARGE PLANNING  Goal: Discharge to home or other facility with appropriate resources  Description: INTERVENTIONS:  - Identify barriers to discharge w/patient and caregiver  - Arrange for needed discharge resources and transportation as appropriate  - Identify discharge learning needs (meds, wound care, etc )  - Arrange for interpretive services to assist at discharge as needed  - Refer to Case Management Department for coordinating discharge planning if the patient needs post-hospital services based on physician/advanced practitioner order or complex needs related to functional status, cognitive ability, or social support system  Outcome: Progressing

## 2023-01-15 NOTE — PROGRESS NOTES
Progress Note - [address] Ceasar Morrison 61 y o  female MRN: [id number]  Unit/Bed#: [facility] 820-01 Encounter: [id number]    Assessment:  61 F p/w symptomatic cholelithiasis and choledocholithiasis s/p ERCP on 1/15, planned for cholecystectomy 1/16  VS: AVSS, room air   UOP: multiple unrecorded voids     AM Labs: pending  Tbili previously 1 17 from 1 12       Plan:  -npo  -iv fluid hydration  -to OR for laparoscopic cholecystectomy today  -pain/nausea control   -DVT PPx  -pulmonary toilet  -disposition planning     Subjective/Objective       Subjective: No new complaints, amenable to surgery today     Objective:     Blood pressure 111/55, pulse 60, temperature 97 7 °F (36 5 °C), resp  rate 17, height 5' 1" (1 549 m), weight 67 kg (147 lb 11 3 oz), SpO2 95 %  ,Body mass index is 27 91 kg/m²        Intake/Output Summary (Last 24 hours) at 1/16/2023 0543  Last data filed at 1/16/2023 0422  Gross per 24 hour   Intake 4631 25 ml   Output --   Net 4631 25 ml       Invasive Devices     Peripheral Intravenous Line  Duration           Peripheral IV 01/15/23 Distal;Right;Ventral (anterior) Forearm <1 day                Physical Exam:     General: NAD  HEENT: normocephalic, atraumatic   Cardiovascular: RRR  Respiratory: No distress, room air   Abdomen: soft, minimal RUQ tenderness, non-distended  Extremities: no c/c/e   Neuro: AAOx3  Skin: warm, dry, no jaundice       Lab, Imaging and other studies:pending   VTE Pharmacologic Prophylaxis: Heparin  VTE Mechanical Prophylaxis: sequential compression device

## 2023-01-15 NOTE — ANESTHESIA POSTPROCEDURE EVALUATION
Post-Op Assessment Note    CV Status:  Stable  Pain Score: 0    Pain management: adequate     Mental Status:  Alert and awake   Hydration Status:  Euvolemic   PONV Controlled:  Controlled   Airway Patency:  Patent  Airway: intubated   Two or more mitigation strategies used for obstructive sleep apnea   Post Op Vitals Reviewed: Yes      Staff: CRNA         No notable events documented      BP   135/65   Temp   98 8   Pulse  63   Resp   11   SpO2   100%

## 2023-01-15 NOTE — UTILIZATION REVIEW
Initial Clinical Review    Admission: Date/Time/Statement:   Admission Orders (From admission, onward)     Ordered        01/14/23 0243  Inpatient Admission  Once                      Orders Placed This Encounter   Procedures   • Inpatient Admission     Standing Status:   Standing     Number of Occurrences:   1     Order Specific Question:   Level of Care     Answer:   Med Surg [16]     Order Specific Question:   Estimated length of stay     Answer:   Not Applicable     ED Arrival Information     Expected   -    Arrival   1/13/2023 19:19    Acuity   Urgent            Means of arrival   Walk-In    Escorted by   Self    Service   Surgery-General    Admission type   Emergency            Arrival complaint   ABD pain           Chief Complaint   Patient presents with   • Abdominal Pain     Patient states she has been having abdominal pain for approx 1 month but has progressively gotten worse over past 2 weeks  Patient c/o generalized abdominal pain, specifically after eating  Patient also reports vomiting       Initial Presentation: 61 y o  female presents to ED as walk-in with 2 wk h/o RUQ pain with intermittent nausea  Pt also reports colicky symptoms after meals  RUQ US 3 mm Gb wall, cholelithiasis, no pericholecystic fluid, CBD 4 mm  CTAP: cholelithiasis, incidental R ovarian calcifications  K 3 2, Tbili 2 2, ,   Admit inpatient to M/S unit under surgery service with Cholithiasis with c/f choledocholithiasis, elevated transaminases  Continue npo, trend LFTs  Pain/nausea control  GI consulted with plan for MRCP  GI consult -- Okay for low-fat diet for now, plan for ERCP tentatively tomorrow vs next week  Have low threshold to start antibiotics and initiate septic work-up if pt does develop fever, chills, encephalopathy, worsening leukocytosis  CMP daily   Will need cholecystectomy    Date: 1/15   Day 2: Reports some minimal abdominal pain, tolerated clears yesterday without n/v, passing flatus but not having bms, ambulating  VSS  WBC 5 49 from 5 98  Hb 10 6-10 8  T bili 1 17 from 1 12  Continue npo for possible ERCP today w/ GI, lap bao following ERCP  Follow T-bili  LR @ 125 ml/hr  SQH/SCDs  Pain control  Encourage ambulation  ED Triage Vitals   Temperature Pulse Respirations Blood Pressure SpO2   01/13/23 1934 01/13/23 1934 01/13/23 1934 01/13/23 1934 01/13/23 1934   98 2 °F (36 8 °C) 72 20 120/76 98 %      Temp Source Heart Rate Source Patient Position - Orthostatic VS BP Location FiO2 (%)   01/13/23 1934 01/13/23 1934 01/13/23 2350 01/13/23 2350 --   Oral Monitor Lying Right arm       Pain Score       01/13/23 1934       8          Wt Readings from Last 1 Encounters:   01/14/23 67 kg (147 lb 11 3 oz)     Additional Vital Signs:   Date/Time Temp Pulse Resp BP MAP (mmHg) SpO2 O2 Device Patient Position - Orthostatic VS   01/15/23 07:18:10 97 5 °F (36 4 °C) -- 16 114/55 -- -- -- Lying   01/14/23 21:46:57 97 7 °F (36 5 °C) -- 17 118/58 -- -- -- --   01/14/23 1525 98 5 °F (36 9 °C) 65 -- 115/62 -- -- -- Sitting   01/14/23 1506 98 1 °F (36 7 °C) 68 16 122/71 -- 98 % -- --   01/14/23 0715 98 8 °F (37 1 °C) 58 18 113/58 78 96 % None (Room air) Lying   01/14/23 0600 -- 58 18 -- -- 94 % -- --   01/13/23 2350 -- 70 18 120/66 88 96 % None (Room air) Lying   01/13/23 2107 -- -- -- -- -- -- None (Room air) --   01/13/23 1934 98 2 °F (36 8 °C) 72 20 120/76 -- 98 % None (Room air) --     Pertinent Labs/Diagnostic Test Results:   MRI abdomen wo contrast and mrcp   Final Result by Noemi Hewitt DO (01/14 1025)      1  Mild CBD dilatation secondary to 5-6 mm filling defect distal CBD consistent with choledocholithiasis  2  Distended gallbladder with cholelithiasis but no evidence for cholecystitis  3  Fibroid uterus  CT abdomen pelvis with contrast   Final Result by Ana Paula Haile MD (01/14 0101)      Myomatous uterus   Speckled calcifications are seen in the right ovary concerning for the possibility of a germ cell tumor  Recommend elective ultrasound for further evaluation  US right upper quadrant   Final Result by Viji Jennings MD (01/13 2308)      Gallstones are present  Sonographic Pimentel sign was negative however patient was given analgesia  Recommend clinical correlation  There is hepatomegaly              Results from last 7 days   Lab Units 01/15/23  0437 01/14/23  0520 01/13/23 2033   WBC Thousand/uL 5 49 5 98 6 92   HEMOGLOBIN g/dL 10 6* 10 8* 10 7*   HEMATOCRIT % 33 0* 34 0* 32 9*   PLATELETS Thousands/uL 298 309 302   NEUTROS ABS Thousands/µL 3 24 3 91 4 93     Results from last 7 days   Lab Units 01/15/23  0437 01/14/23  0520 01/13/23 2033   SODIUM mmol/L 143 145 141   POTASSIUM mmol/L 3 5 3 5 3 2*   CHLORIDE mmol/L 108 112* 107   CO2 mmol/L 28 27 27   ANION GAP mmol/L 7 6 7   BUN mg/dL 7 7 11   CREATININE mg/dL 0 47* 0 51* 0 51*   EGFR ml/min/1 73sq m 109 106 106   CALCIUM mg/dL 8 9 8 8 9 1   MAGNESIUM mg/dL  --  2 2  --    PHOSPHORUS mg/dL  --  3 4  --      Results from last 7 days   Lab Units 01/15/23  0437 01/14/23  0520 01/13/23 2033   AST U/L 96* 238* 228*   ALT U/L 256* 361* 367*   ALK PHOS U/L 451* 539* 557*   TOTAL PROTEIN g/dL 6 5 6 6 7 2   ALBUMIN g/dL 3 1* 3 1* 3 6   TOTAL BILIRUBIN mg/dL 1 17* 1 12* 2 20*     Results from last 7 days   Lab Units 01/15/23  0437 01/14/23  0520 01/13/23 2033   GLUCOSE RANDOM mg/dL 99 116 123     Results from last 7 days   Lab Units 01/14/23  0011 01/13/23 2157   HS TNI 0HR ng/L  --  6   HS TNI 2HR ng/L 7  --    HSTNI D2 ng/L 1  --      Results from last 7 days   Lab Units 01/13/23 2033   LIPASE u/L 201       ED Treatment:   Medication Administration from 01/13/2023 1919 to 01/14/2023 0735       Date/Time Order Dose Route Action     01/13/2023 2034 EST ondansetron (ZOFRAN) injection 4 mg 4 mg Intravenous Given     01/13/2023 2035 EST sodium chloride 0 9 % bolus 1,000 mL 1,000 mL Intravenous New Bag     01/13/2023 2130 EST morphine injection 2 mg 2 mg Intravenous Given     01/14/2023 0008 EST potassium chloride (K-DUR,KLOR-CON) CR tablet 40 mEq 40 mEq Oral Given     01/14/2023 0326 EST lactated ringers infusion 125 mL/hr Intravenous New Bag     Past Medical History:   Diagnosis Date   • Hypothyroidism    • Periodontal disease 02/07/2022       Admitting Diagnosis: Abdominal pain [R10 9]  Calculus of gallbladder with biliary obstruction but without cholecystitis [K80 21]  Age/Sex: 61 y o  female  Admission Orders:  Scheduled Medications:  acetaminophen, 650 mg, Oral, Q6H [address]  heparin (porcine), 5,000 Units, Subcutaneous, Q8H [address]  hydroxychloroquine, 200 mg, Oral, Daily With Breakfast  levothyroxine, 25 mcg, Oral, Early Morning  pantoprazole, 40 mg, Oral, Early Morning  potassium chloride, 20 mEq, Intravenous, Q2H    Continuous IV Infusions:  lactated ringers, 125 mL/hr, Intravenous, Continuous    PRN Meds:  HYDROmorphone, 0 5 mg, Intravenous, Q4H PRN 1/14 x2  ondansetron, 4 mg, Intravenous, Q6H PRN 1/14 x1  oxyCODONE, 10 mg, Oral, Q4H PRN 1/14 x3  oxyCODONE, 5 mg, Oral, Q4H PRN        Network Utilization Review Department  ATTENTION: Please call with any questions or concerns to [phone] and carefully listen to the prompts so that you are directed to the right person  All voicemails are confidential   Dwayne Osman all requests for admission clinical reviews, approved or denied determinations and any other requests to dedicated fax number below belonging to the campus where the patient is receiving treatment   List of dedicated fax numbers for the Facilities:  [address] [place] [address] DENIALS (Administrative/Medical Necessity) [phone]   [address] [place] [address] (Maternity/NICU/Pediatrics) [phone]   [address] Lazara King  [phone] [address] [facility] [address] [place] [address] [facility] Divisionpar[facility] [address] [facility] [address] [phone]

## 2023-01-15 NOTE — PROGRESS NOTES
Progress Note - [address] Ceasar Morrison 61 y o  female MRN: [id number]  [facility] Encounter: [id number]    Assessment:  55yoF w symptomatic cholelithiasis and choledocholithiasis on MRCP    Vitals stable, afebrile  WBC 5 49 from 5 98  Hb 10 6 from 10 8  Cr 0 47  T bili 1 17 from 1 12    Multiple UOP    Plan:  - NPO for possible ERCP today w GI, lap bao following ERCP  - follow t bili  - [email or web address]  - SQH  - pain control  - encourage ambulation    Subjective/Objective   Subjective:   Reports some minimal abdominal pain, tolerated clears yesterday without nausea or emesis, passing flatus but not having bowel movements, ambulating  Objective:     Blood pressure 114/55, pulse 65, temperature 97 5 °F (36 4 °C), temperature source Tympanic, resp  rate 16, height 5' 1" (1 549 m), weight 67 kg (147 lb 11 3 oz), SpO2 98 %  ,Body mass index is 27 91 kg/m²  Intake/Output Summary (Last 24 hours) at 1/15/2023 0814  Last data filed at 1/15/2023 0139  Gross per 24 hour   Intake 2003 33 ml   Output --   Net 2003 33 ml       Invasive Devices     Peripheral Intravenous Line  Duration           Peripheral IV 01/13/23 Right Antecubital 1 day                Physical Exam:  General: NAD  Skin: Warm, dry, anicteric  HEENT: Normocephalic, atraumatic  CV: RRR, no m/r/g  Pulm: CTA b/l, no inc WOB  Abd: Soft, ND, minimally tender  MSK: Symmetric, no edema, no tenderness, no deformity  Neuro: AOx3, GCS 15    Lab, Imaging and other studies:  I have personally reviewed pertinent lab results    , CBC:   Lab Results   Component Value Date    WBC 5 49 01/15/2023    HGB 10 6 (L) 01/15/2023    HCT 33 0 (L) 01/15/2023    MCV 91 01/15/2023     01/15/2023    MCH 29 1 01/15/2023    MCHC 32 1 01/15/2023    RDW 13 1 01/15/2023    MPV 10 1 01/15/2023    NRBC 0 01/15/2023   , CMP:   Lab Results   Component Value Date    SODIUM 143 01/15/2023    K 3 5 01/15/2023     01/15/2023    CO2 28 01/15/2023    BUN 7 01/15/2023    CREATININE 0 47 (L) 01/15/2023    CALCIUM 8 9 01/15/2023    AST 96 (H) 01/15/2023     (H) 01/15/2023    ALKPHOS 451 (H) 01/15/2023    EGFR 109 01/15/2023     VTE Pharmacologic Prophylaxis: Sequential compression device (Venodyne)  and Heparin  VTE Mechanical Prophylaxis: sequential compression device

## 2023-01-15 NOTE — ANESTHESIA PREPROCEDURE EVALUATION
Procedure:  ERCP    Relevant Problems   ENDO   (+) Subclinical hypothyroidism      MUSCULOSKELETAL   (+) Rheumatoid arthritis of multiple sites with negative rheumatoid factor (HCC)      Digestive   (+) Periodontal disease   (+) Symptomatic cholelithiasis      Other   (+) Urinary incontinence        Physical Exam    Airway    Mallampati score: II  TM Distance: >3 FB  Neck ROM: full     Dental       Cardiovascular  Cardiovascular exam normal    Pulmonary  Pulmonary exam normal     Other Findings        Anesthesia Plan  ASA Score- 2 Emergent    Anesthesia Type- general with ASA Monitors  Additional Monitors:   Airway Plan: ETT  Plan Factors-Exercise tolerance (METS): >4 METS  Chart reviewed  Existing labs reviewed  Patient is not a current smoker  Patient not instructed to abstain from smoking on day of procedure  Patient did not smoke on day of surgery  Induction- intravenous  Postoperative Plan-     Informed Consent- Anesthetic plan and risks discussed with patient  I personally reviewed this patient with the CRNA  Discussed and agreed on the Anesthesia Plan with the CRNA  Tripp Yañez

## 2023-01-15 NOTE — PLAN OF CARE
Problem: PAIN - ADULT  Goal: Verbalizes/displays adequate comfort level or baseline comfort level  Description: Interventions:  - Encourage patient to monitor pain and request assistance  - Assess pain using appropriate pain scale  - Administer analgesics based on type and severity of pain and evaluate response  - Implement non-pharmacological measures as appropriate and evaluate response  - Consider cultural and social influences on pain and pain management  - Notify physician/advanced practitioner if interventions unsuccessful or patient reports new pain  Outcome: Progressing     Problem: INFECTION - ADULT  Goal: Absence or prevention of progression during hospitalization  Description: INTERVENTIONS:  - Assess and monitor for signs and symptoms of infection  - Monitor lab/diagnostic results  - Monitor all insertion sites, i e  indwelling lines, tubes, and drains  - Monitor endotracheal if appropriate and nasal secretions for changes in amount and color  - Union appropriate cooling/warming therapies per order  - Administer medications as ordered  - Instruct and encourage patient and family to use good hand hygiene technique  - Identify and instruct in appropriate isolation precautions for identified infection/condition  Outcome: Progressing

## 2023-01-15 NOTE — CASE MANAGEMENT
Case Management Assessment & Discharge Planning Note    Patient name Watson Manuel  Location [facility] 820/[facility] 820-01 MRN [id number]  : 1965 Date 1/15/2023       Current Admission Date: 2023  Current Admission Diagnosis:Symptomatic cholelithiasis   Patient Active Problem List    Diagnosis Date Noted   • Symptomatic cholelithiasis 2023   • Rheumatoid arthritis of multiple sites with negative rheumatoid factor ([address] ) 2022   • Subclinical hypothyroidism 2022   • Urinary incontinence 2022   • Periodontal disease 2022      LOS (days): 1  Geometric Mean LOS (GMLOS) (days):   Days to GMLOS:     OBJECTIVE:    Risk of Unplanned Readmission Score: 10 56         Current admission status: Inpatient       Preferred Pharmacy:   [address] [place] [address] [address] [address]  Phone: [phone] Fax: [phone]    [address], [address] [place] [address]  Phone: [phone] Fax: [phone]    Primary Care Provider: Charles Lopez DO    Primary Insurance:   Secondary Insurance:     ASSESSMENT:  Felix Schwab Proxies    There are no active Health Care Proxies on file  Advance Directives  Does patient have a [address]?: No  Does patient have Advance Directives?: No              Patient Information  Admitted from[de-identified] Home  Mental Status: Alert  During Assessment patient was accompanied by: Not accompanied during assessment  Assessment information provided by[de-identified] Patient  Primary Caregiver: Self  Support Systems: Friend, Daughter  Home entry access options   Select all that apply : Stairs  Number of steps to enter home : 2  Type of Current Residence: [address] [place] home  Upon entering residence, is there a bedroom on the main floor (no further steps)?: No  A bedroom is located on the following floor levels of residence (select all that apply):: 2nd Floor  Upon entering residence, is there a bathroom on the main floor (no further steps)?: Yes  Number of steps to 2nd floor from main floor: One Flight  In the last 12 months, was there a time when you were not able to pay the mortgage or rent on time?: No  In the last 12 months, how many places have you lived?: 1  In the last 12 months, was there a time when you did not have a steady place to sleep or slept in a shelter (including now)?: No  Living Arrangements: Lives w/ Friend    Activities of Daily Living Prior to Admission  Functional Status: Independent  Completes ADLs independently?: Yes  Ambulates independently?: Yes  Does patient use assisted devices?: No  Does patient currently own DME?: No  Does patient have a history of Outpatient Therapy (PT/OT)?: No  Does the patient have a history of Short-Term Rehab?: No  Does patient have a history of HHC?: No         Patient Information Continued  Income Source: Unemployed  Does patient have prescription coverage?: No  Within the past 12 months, you worried that your food would run out before you got the money to buy more : Never true  Within the past 12 months, the food you bought just didn't last and you didn't have money to get more : Never true  Does patient receive dialysis treatments?: No  Does patient have a history of substance abuse?: No  Does patient have a history of Mental Health Diagnosis?: No         Means of Transportation  Means of Transport to Appts[de-identified] Drives Self  In the past 12 months, has lack of transportation kept you from medical appointments or from getting medications?: No  In the past 12 months, has lack of transportation kept you from meetings, work, or from getting things needed for daily living?: No        DISCHARGE DETAILS:    Discharge planning discussed with[de-identified] pt  Freedom of Choice: Yes                   Contacts  Patient Contacts: lizette Small  Relationship to Patient[de-identified] Family  Contact Method: Phone  Phone Number: [phone]  Reason/Outcome: Continuity of Care, Emergency Contact, Discharge Planning                   Would you like to participate in our [address] [place] [address] Jasmine service program?  : Patient would like more information     CM reviewed d/c planning process including the following: identifying help at home, patient preference for d/c planning needs, Discharge Lounge, [facility] Meds to Bed program, availability of treatment team to discuss questions or concerns patient and/or family may have regarding understanding medications and recognizing signs and symptoms once discharged  CM also encouraged patient to follow up with all recommended appointments after discharge  Patient advised of importance for patient and family to participate in managing patient’s medical well being  Patient/caregiver received discharge checklist   Content reviewed  Patient/caregiver encouraged to participate in discharge plan of care prior to discharge home

## 2023-01-16 ENCOUNTER — ANESTHESIA (INPATIENT)
Dept: PERIOP | Facility: HOSPITAL | Age: 58
End: 2023-01-16

## 2023-01-16 ENCOUNTER — ANESTHESIA EVENT (INPATIENT)
Dept: PERIOP | Facility: HOSPITAL | Age: 58
End: 2023-01-16

## 2023-01-16 LAB
ALBUMIN SERPL BCP-MCNC: 3 G/DL (ref 3.5–5)
ALP SERPL-CCNC: 416 U/L (ref 46–116)
ALT SERPL W P-5'-P-CCNC: 193 U/L (ref 12–78)
ANION GAP SERPL CALCULATED.3IONS-SCNC: 7 MMOL/L (ref 4–13)
AST SERPL W P-5'-P-CCNC: 56 U/L (ref 5–45)
ATRIAL RATE: 60 BPM
BASOPHILS # BLD AUTO: 0.02 THOUSANDS/ÂΜL (ref 0–0.1)
BASOPHILS NFR BLD AUTO: 0 % (ref 0–1)
BILIRUB SERPL-MCNC: 1.06 MG/DL (ref 0.2–1)
BUN SERPL-MCNC: 9 MG/DL (ref 5–25)
CALCIUM ALBUM COR SERPL-MCNC: 10 MG/DL (ref 8.3–10.1)
CALCIUM SERPL-MCNC: 9.2 MG/DL (ref 8.3–10.1)
CHLORIDE SERPL-SCNC: 107 MMOL/L (ref 96–108)
CO2 SERPL-SCNC: 27 MMOL/L (ref 21–32)
CREAT SERPL-MCNC: 0.48 MG/DL (ref 0.6–1.3)
EOSINOPHIL # BLD AUTO: 0 THOUSAND/ÂΜL (ref 0–0.61)
EOSINOPHIL NFR BLD AUTO: 0 % (ref 0–6)
ERYTHROCYTE [DISTWIDTH] IN BLOOD BY AUTOMATED COUNT: 13 % (ref 11.6–15.1)
GFR SERPL CREATININE-BSD FRML MDRD: 108 ML/MIN/1.73SQ M
GLUCOSE SERPL-MCNC: 89 MG/DL (ref 65–140)
HCT VFR BLD AUTO: 32.7 % (ref 34.8–46.1)
HGB BLD-MCNC: 10.9 G/DL (ref 11.5–15.4)
IMM GRANULOCYTES # BLD AUTO: 0.02 THOUSAND/UL (ref 0–0.2)
IMM GRANULOCYTES NFR BLD AUTO: 0 % (ref 0–2)
LYMPHOCYTES # BLD AUTO: 1.32 THOUSANDS/ÂΜL (ref 0.6–4.47)
LYMPHOCYTES NFR BLD AUTO: 17 % (ref 14–44)
MAGNESIUM SERPL-MCNC: 2 MG/DL (ref 1.6–2.6)
MCH RBC QN AUTO: 30.1 PG (ref 26.8–34.3)
MCHC RBC AUTO-ENTMCNC: 33.3 G/DL (ref 31.4–37.4)
MCV RBC AUTO: 90 FL (ref 82–98)
MONOCYTES # BLD AUTO: 0.59 THOUSAND/ÂΜL (ref 0.17–1.22)
MONOCYTES NFR BLD AUTO: 8 % (ref 4–12)
NEUTROPHILS # BLD AUTO: 5.71 THOUSANDS/ÂΜL (ref 1.85–7.62)
NEUTS SEG NFR BLD AUTO: 75 % (ref 43–75)
NRBC BLD AUTO-RTO: 0 /100 WBCS
P AXIS: 62 DEGREES
PHOSPHATE SERPL-MCNC: 5 MG/DL (ref 2.7–4.5)
PLATELET # BLD AUTO: 317 THOUSANDS/UL (ref 149–390)
PMV BLD AUTO: 10.2 FL (ref 8.9–12.7)
POTASSIUM SERPL-SCNC: 3.5 MMOL/L (ref 3.5–5.3)
PR INTERVAL: 132 MS
PROT SERPL-MCNC: 6.6 G/DL (ref 6.4–8.4)
QRS AXIS: 53 DEGREES
QRSD INTERVAL: 82 MS
QT INTERVAL: 432 MS
QTC INTERVAL: 432 MS
RBC # BLD AUTO: 3.62 MILLION/UL (ref 3.81–5.12)
SODIUM SERPL-SCNC: 141 MMOL/L (ref 135–147)
T WAVE AXIS: 52 DEGREES
VENTRICULAR RATE: 60 BPM
WBC # BLD AUTO: 7.66 THOUSAND/UL (ref 4.31–10.16)

## 2023-01-16 PROCEDURE — 0FT44ZZ RESECTION OF GALLBLADDER, PERCUTANEOUS ENDOSCOPIC APPROACH: ICD-10-PCS | Performed by: STUDENT IN AN ORGANIZED HEALTH CARE EDUCATION/TRAINING PROGRAM

## 2023-01-16 RX ORDER — SODIUM CHLORIDE, SODIUM LACTATE, POTASSIUM CHLORIDE, CALCIUM CHLORIDE 600; 310; 30; 20 MG/100ML; MG/100ML; MG/100ML; MG/100ML
INJECTION, SOLUTION INTRAVENOUS CONTINUOUS PRN
Status: DISCONTINUED | OUTPATIENT
Start: 2023-01-16 | End: 2023-01-16

## 2023-01-16 RX ORDER — ONDANSETRON 2 MG/ML
4 INJECTION INTRAMUSCULAR; INTRAVENOUS ONCE AS NEEDED
Status: COMPLETED | OUTPATIENT
Start: 2023-01-16 | End: 2023-01-16

## 2023-01-16 RX ORDER — MIDAZOLAM HYDROCHLORIDE 2 MG/2ML
INJECTION, SOLUTION INTRAMUSCULAR; INTRAVENOUS AS NEEDED
Status: DISCONTINUED | OUTPATIENT
Start: 2023-01-16 | End: 2023-01-16

## 2023-01-16 RX ORDER — EPHEDRINE SULFATE 50 MG/ML
INJECTION INTRAVENOUS AS NEEDED
Status: DISCONTINUED | OUTPATIENT
Start: 2023-01-16 | End: 2023-01-16

## 2023-01-16 RX ORDER — ROCURONIUM BROMIDE 10 MG/ML
INJECTION, SOLUTION INTRAVENOUS AS NEEDED
Status: DISCONTINUED | OUTPATIENT
Start: 2023-01-16 | End: 2023-01-16

## 2023-01-16 RX ORDER — DEXAMETHASONE SODIUM PHOSPHATE 10 MG/ML
INJECTION, SOLUTION INTRAMUSCULAR; INTRAVENOUS AS NEEDED
Status: DISCONTINUED | OUTPATIENT
Start: 2023-01-16 | End: 2023-01-16

## 2023-01-16 RX ORDER — HYDROMORPHONE HCL/PF 1 MG/ML
0.5 SYRINGE (ML) INJECTION
Status: DISCONTINUED | OUTPATIENT
Start: 2023-01-16 | End: 2023-01-16 | Stop reason: HOSPADM

## 2023-01-16 RX ORDER — SODIUM CHLORIDE, SODIUM LACTATE, POTASSIUM CHLORIDE, CALCIUM CHLORIDE 600; 310; 30; 20 MG/100ML; MG/100ML; MG/100ML; MG/100ML
75 INJECTION, SOLUTION INTRAVENOUS CONTINUOUS
Status: DISCONTINUED | OUTPATIENT
Start: 2023-01-16 | End: 2023-01-17

## 2023-01-16 RX ORDER — POTASSIUM CHLORIDE 20 MEQ/1
40 TABLET, EXTENDED RELEASE ORAL ONCE
Status: COMPLETED | OUTPATIENT
Start: 2023-01-16 | End: 2023-01-16

## 2023-01-16 RX ORDER — FENTANYL CITRATE 50 UG/ML
INJECTION, SOLUTION INTRAMUSCULAR; INTRAVENOUS AS NEEDED
Status: DISCONTINUED | OUTPATIENT
Start: 2023-01-16 | End: 2023-01-16

## 2023-01-16 RX ORDER — MAGNESIUM HYDROXIDE 1200 MG/15ML
LIQUID ORAL AS NEEDED
Status: DISCONTINUED | OUTPATIENT
Start: 2023-01-16 | End: 2023-01-16 | Stop reason: HOSPADM

## 2023-01-16 RX ORDER — PROPOFOL 10 MG/ML
INJECTION, EMULSION INTRAVENOUS AS NEEDED
Status: DISCONTINUED | OUTPATIENT
Start: 2023-01-16 | End: 2023-01-16

## 2023-01-16 RX ORDER — SUCCINYLCHOLINE/SOD CL,ISO/PF 100 MG/5ML
SYRINGE (ML) INTRAVENOUS AS NEEDED
Status: DISCONTINUED | OUTPATIENT
Start: 2023-01-16 | End: 2023-01-16

## 2023-01-16 RX ORDER — ONDANSETRON 2 MG/ML
INJECTION INTRAMUSCULAR; INTRAVENOUS AS NEEDED
Status: DISCONTINUED | OUTPATIENT
Start: 2023-01-16 | End: 2023-01-16

## 2023-01-16 RX ORDER — DIPHENHYDRAMINE HYDROCHLORIDE 50 MG/ML
12.5 INJECTION INTRAMUSCULAR; INTRAVENOUS ONCE AS NEEDED
Status: DISCONTINUED | OUTPATIENT
Start: 2023-01-16 | End: 2023-01-16 | Stop reason: HOSPADM

## 2023-01-16 RX ORDER — ALBUTEROL SULFATE 2.5 MG/3ML
2.5 SOLUTION RESPIRATORY (INHALATION) ONCE AS NEEDED
Status: DISCONTINUED | OUTPATIENT
Start: 2023-01-16 | End: 2023-01-16 | Stop reason: HOSPADM

## 2023-01-16 RX ORDER — LIDOCAINE HYDROCHLORIDE 10 MG/ML
INJECTION, SOLUTION EPIDURAL; INFILTRATION; INTRACAUDAL; PERINEURAL AS NEEDED
Status: DISCONTINUED | OUTPATIENT
Start: 2023-01-16 | End: 2023-01-16

## 2023-01-16 RX ORDER — FENTANYL CITRATE/PF 50 MCG/ML
50 SYRINGE (ML) INJECTION
Status: DISCONTINUED | OUTPATIENT
Start: 2023-01-16 | End: 2023-01-16 | Stop reason: HOSPADM

## 2023-01-16 RX ORDER — HYDROMORPHONE HCL/PF 1 MG/ML
SYRINGE (ML) INJECTION AS NEEDED
Status: DISCONTINUED | OUTPATIENT
Start: 2023-01-16 | End: 2023-01-16

## 2023-01-16 RX ADMIN — ONDANSETRON 4 MG: 2 INJECTION INTRAMUSCULAR; INTRAVENOUS at 12:26

## 2023-01-16 RX ADMIN — METRONIDAZOLE: 500 INJECTION, SOLUTION INTRAVENOUS at 11:20

## 2023-01-16 RX ADMIN — HEPARIN SODIUM 5000 UNITS: 5000 INJECTION INTRAVENOUS; SUBCUTANEOUS at 05:27

## 2023-01-16 RX ADMIN — FENTANYL CITRATE 25 MCG: 50 INJECTION INTRAMUSCULAR; INTRAVENOUS at 11:36

## 2023-01-16 RX ADMIN — HEPARIN SODIUM 5000 UNITS: 5000 INJECTION INTRAVENOUS; SUBCUTANEOUS at 21:27

## 2023-01-16 RX ADMIN — OXYCODONE HYDROCHLORIDE 5 MG: 5 TABLET ORAL at 00:35

## 2023-01-16 RX ADMIN — ONDANSETRON 4 MG: 2 INJECTION INTRAMUSCULAR; INTRAVENOUS at 13:30

## 2023-01-16 RX ADMIN — HYDROMORPHONE HYDROCHLORIDE 0.25 MG: 1 INJECTION, SOLUTION INTRAMUSCULAR; INTRAVENOUS; SUBCUTANEOUS at 12:57

## 2023-01-16 RX ADMIN — OXYCODONE HYDROCHLORIDE 10 MG: 10 TABLET ORAL at 14:48

## 2023-01-16 RX ADMIN — OXYCODONE HYDROCHLORIDE 10 MG: 10 TABLET ORAL at 20:28

## 2023-01-16 RX ADMIN — EPHEDRINE SULFATE 5 MG: 50 INJECTION INTRAVENOUS at 11:28

## 2023-01-16 RX ADMIN — ROCURONIUM BROMIDE 10 MG: 10 INJECTION, SOLUTION INTRAVENOUS at 12:14

## 2023-01-16 RX ADMIN — LIDOCAINE HYDROCHLORIDE 50 MG: 10 INJECTION, SOLUTION EPIDURAL; INFILTRATION; INTRACAUDAL; PERINEURAL at 11:12

## 2023-01-16 RX ADMIN — SODIUM CHLORIDE, SODIUM LACTATE, POTASSIUM CHLORIDE, AND CALCIUM CHLORIDE 125 ML/HR: 600; 310; 30; 20 INJECTION, SOLUTION INTRAVENOUS at 04:23

## 2023-01-16 RX ADMIN — DEXAMETHASONE SODIUM PHOSPHATE 10 MG: 10 INJECTION, SOLUTION INTRAMUSCULAR; INTRAVENOUS at 11:12

## 2023-01-16 RX ADMIN — FENTANYL CITRATE 25 MCG: 50 INJECTION INTRAMUSCULAR; INTRAVENOUS at 11:33

## 2023-01-16 RX ADMIN — FENTANYL CITRATE 50 MCG: 50 INJECTION INTRAMUSCULAR; INTRAVENOUS at 13:30

## 2023-01-16 RX ADMIN — ROCURONIUM BROMIDE 30 MG: 10 INJECTION, SOLUTION INTRAVENOUS at 11:27

## 2023-01-16 RX ADMIN — SUGAMMADEX 200 MG: 100 INJECTION, SOLUTION INTRAVENOUS at 12:48

## 2023-01-16 RX ADMIN — PROPOFOL 170 MG: 10 INJECTION, EMULSION INTRAVENOUS at 11:12

## 2023-01-16 RX ADMIN — FENTANYL CITRATE 50 MCG: 50 INJECTION INTRAMUSCULAR; INTRAVENOUS at 11:12

## 2023-01-16 RX ADMIN — EPHEDRINE SULFATE 5 MG: 50 INJECTION INTRAVENOUS at 11:23

## 2023-01-16 RX ADMIN — CEFAZOLIN SODIUM 2000 MG: 2 SOLUTION INTRAVENOUS at 11:15

## 2023-01-16 RX ADMIN — EPHEDRINE SULFATE 5 MG: 50 INJECTION INTRAVENOUS at 12:54

## 2023-01-16 RX ADMIN — MIDAZOLAM 2 MG: 1 INJECTION INTRAMUSCULAR; INTRAVENOUS at 11:02

## 2023-01-16 RX ADMIN — Medication 100 MG: at 11:12

## 2023-01-16 RX ADMIN — HYDROMORPHONE HYDROCHLORIDE 0.25 MG: 1 INJECTION, SOLUTION INTRAMUSCULAR; INTRAVENOUS; SUBCUTANEOUS at 13:04

## 2023-01-16 RX ADMIN — SODIUM CHLORIDE, SODIUM LACTATE, POTASSIUM CHLORIDE, AND CALCIUM CHLORIDE: .6; .31; .03; .02 INJECTION, SOLUTION INTRAVENOUS at 11:15

## 2023-01-16 RX ADMIN — POTASSIUM CHLORIDE 40 MEQ: 1500 TABLET, EXTENDED RELEASE ORAL at 08:52

## 2023-01-16 RX ADMIN — HYDROXYCHLOROQUINE SULFATE 200 MG: 200 TABLET ORAL at 08:13

## 2023-01-16 NOTE — QUICK NOTE
Post Op Check Note - Surgery Resident  [address] 61 y o  female MRN: [id number]  Unit/Bed#: [facility] 820-01 Encounter: [id number]    ASSESSMENT:  Yuan Luis is a 61 y o  female who is status post lap bao    Subjective: Endorses some abdominal pain, mild nausea, no emesis  Also endorses some R shoulder pain    Physical Exam:  GEN: NAD  CV: RRR  Lung: Normal effort  Ab: Soft, ND, appropriately mildly tender near incisions & RUQ  Neuro: A+Ox3  Incisions: CDI    Blood pressure 120/58, pulse 81, temperature 97 9 °F (36 6 °C), temperature source Tympanic, resp  rate 16, height 5' 1" (1 549 m), weight 67 kg (147 lb 11 3 oz), SpO2 92 %  ,Body mass index is 27 91 kg/m²        Intake/Output Summary (Last 24 hours) at 1/16/2023 1536  Last data filed at 1/16/2023 1314  Gross per 24 hour   Intake 4989 58 ml   Output 50 ml   Net 4939 58 ml       Invasive Devices     Peripheral Intravenous Line  Duration           Peripheral IV 01/15/23 Distal;Right;Ventral (anterior) Forearm <1 day                VTE Pharmacologic Prophylaxis: Heparin

## 2023-01-16 NOTE — PROGRESS NOTES
Progress Note- Kerline Reid 61 y o  female MRN: [id number]    Unit/Bed#: OR POOL Encounter: [id number]      Assessment and Plan:  Kerline Reid is a 61 y o  old female with PMH including GERD, hypothyroidism who presented with worsening right upper quadrant abdominal pain  Gastroenterology team has been consulted for assistance with management of choledocholithiasis      1  Choledocholithiasis  2  Cholelithiasis  3  Elevated liver enzymes    · Patient had MRCP which showed mild CBD dilation and choledocholithiasis  Underwent  ERCP on 1/15 in which revealed stone in distal CBD removed using extraction balloon  · Denies any abdominal pain and continues to have improvement in her liver enzymes  · Plan to undergo cholecystectomy with surgery today     GI will sign off at this time    Please call with any questions or concerns     ______________________________________________________________________    Subjective:     Patient denies any abdominal pain    Medication Administration - last 24 hours from 01/15/2023 1049 to 01/16/2023 1049       Date/Time Order Dose Route Action Action by     01/16/2023 0423 EST lactated ringers infusion 125 mL/hr Intravenous New Bag Key Phillips RN     01/16/2023 0422 EST lactated ringers infusion 0 mL/hr Intravenous Stopped Key Phillips RN     01/15/2023 2017 EST lactated ringers infusion 125 mL/hr Intravenous New Bag Key Phillips RN     01/15/2023 2016 EST lactated ringers infusion 0 mL/hr Intravenous Stopped Key Phillips RN     01/15/2023 1458 EST lactated ringers infusion 125 mL/hr Intravenous Continued from [address], RN     01/16/2023 1048 EST ondansetron (ZOFRAN) injection 4 mg -- Intravenous MAR Hold Automatic Transfer Provider     01/20/2023 2200 EST heparin (porcine) subcutaneous injection 5,000 Units -- Subcutaneous Dose Auto Held Automatic Transfer Provider     01/20/2023 1400 EST heparin (porcine) subcutaneous injection 5,000 Units -- Subcutaneous Dose Auto Held Automatic Transfer Provider     01/20/2023 0600 EST heparin (porcine) subcutaneous injection 5,000 Units -- Subcutaneous Dose Auto Held Automatic Transfer Provider     01/19/2023 2200 EST heparin (porcine) subcutaneous injection 5,000 Units -- Subcutaneous Dose Auto Held Automatic Transfer Provider     01/19/2023 1400 EST heparin (porcine) subcutaneous injection 5,000 Units -- Subcutaneous Dose Auto Held Automatic Transfer Provider     01/19/2023 0600 EST heparin (porcine) subcutaneous injection 5,000 Units -- Subcutaneous Dose Auto Held Automatic Transfer Provider     01/18/2023 2200 EST heparin (porcine) subcutaneous injection 5,000 Units -- Subcutaneous Dose Auto Held Automatic Transfer Provider     01/18/2023 1400 EST heparin (porcine) subcutaneous injection 5,000 Units -- Subcutaneous Dose Auto Held Automatic Transfer Provider     01/18/2023 0600 EST heparin (porcine) subcutaneous injection 5,000 Units -- Subcutaneous Dose Auto Held Automatic Transfer Provider     01/17/2023 2200 EST heparin (porcine) subcutaneous injection 5,000 Units -- Subcutaneous Dose Auto Held Automatic Transfer Provider     01/17/2023 1400 EST heparin (porcine) subcutaneous injection 5,000 Units -- Subcutaneous Dose Auto Held Automatic Transfer Provider     01/17/2023 0600 EST heparin (porcine) subcutaneous injection 5,000 Units -- Subcutaneous Dose Auto Held Automatic Transfer Provider     01/16/2023 2200 EST heparin (porcine) subcutaneous injection 5,000 Units -- Subcutaneous Dose Auto Held Automatic Transfer Provider     01/16/2023 1400 EST heparin (porcine) subcutaneous injection 5,000 Units -- Subcutaneous Dose Auto Held Automatic Transfer Provider     01/16/2023 1048 EST heparin (porcine) subcutaneous injection 5,000 Units -- Subcutaneous MAR Hold Automatic Transfer Provider     01/16/2023 0527 EST heparin (porcine) subcutaneous injection 5,000 Units 5,000 Units Subcutaneous Given Min Hinkle Negra Livingston, RN     01/15/2023 2124 EST heparin (porcine) subcutaneous injection 5,000 Units 5,000 Units Subcutaneous Given Jimi Bryan, RN     01/15/2023 1358 EST heparin (porcine) subcutaneous injection 5,000 Units 5,000 Units Subcutaneous Not Given [place] Guard     01/16/2023 1048 EST oxyCODONE (ROXICODONE) IR tablet 5 mg -- Oral MAR Hold Automatic Transfer Provider     01/16/2023 0035 EST oxyCODONE (ROXICODONE) IR tablet 5 mg 5 mg Oral Given Jimi Bryan, RN     01/16/2023 1048 EST oxyCODONE (ROXICODONE) immediate release tablet 10 mg -- Oral MAR Hold Automatic Transfer Provider     01/16/2023 1048 EST HYDROmorphone (DILAUDID) injection 0 5 mg -- Intravenous MAR Hold Automatic Transfer Provider     01/20/2023 0730 EST hydroxychloroquine (PLAQUENIL) tablet 200 mg -- Oral Dose Auto Held Automatic Transfer Provider     01/19/2023 0730 EST hydroxychloroquine (PLAQUENIL) tablet 200 mg -- Oral Dose Auto Held Automatic Transfer Provider     01/18/2023 0730 EST hydroxychloroquine (PLAQUENIL) tablet 200 mg -- Oral Dose Auto Held Automatic Transfer Provider     01/17/2023 0730 EST hydroxychloroquine (PLAQUENIL) tablet 200 mg -- Oral Dose Auto Held Automatic Transfer Provider     01/16/2023 1048 EST hydroxychloroquine (PLAQUENIL) tablet 200 mg -- Oral MAR Hold Automatic Transfer Provider     01/16/2023 0813 EST hydroxychloroquine (PLAQUENIL) tablet 200 mg 200 mg Oral Given Ayala Cavazos, COLBY     01/20/2023 0600 EST levothyroxine tablet 25 mcg -- Oral Dose Auto Held Automatic Transfer Provider     01/19/2023 0600 EST levothyroxine tablet 25 mcg -- Oral Dose Auto Held Automatic Transfer Provider     01/18/2023 0600 EST levothyroxine tablet 25 mcg -- Oral Dose Auto Held Automatic Transfer Provider     01/17/2023 0600 EST levothyroxine tablet 25 mcg -- Oral Dose Auto Held Automatic Transfer Provider     01/16/2023 1048 EST levothyroxine tablet 25 mcg -- Oral [facility] Hold Automatic Transfer Provider     01/16/2023 0528 EST levothyroxine tablet 25 mcg 25 mcg Oral Refused Alexy Vences, COLBY     01/20/2023 0600 EST pantoprazole (PROTONIX) EC tablet 40 mg -- Oral Dose Auto Held Automatic Transfer Provider     01/19/2023 0600 EST pantoprazole (PROTONIX) EC tablet 40 mg -- Oral Dose Auto Held Automatic Transfer Provider     01/18/2023 0600 EST pantoprazole (PROTONIX) EC tablet 40 mg -- Oral Dose Auto Held Automatic Transfer Provider     01/17/2023 0600 EST pantoprazole (PROTONIX) EC tablet 40 mg -- Oral Dose Auto Held Automatic Transfer Provider     01/16/2023 1048 EST pantoprazole (PROTONIX) EC tablet 40 mg -- Oral MAR Hold Automatic Transfer Provider     01/16/2023 0528 EST pantoprazole (PROTONIX) EC tablet 40 mg 40 mg Oral Refused Alexy Vences RN     01/20/2023 1800 EST acetaminophen (TYLENOL) tablet 650 mg -- Oral Dose Auto Held Automatic Transfer Provider     01/20/2023 1200 EST acetaminophen (TYLENOL) tablet 650 mg -- Oral Dose Auto Held Automatic Transfer Provider     01/20/2023 0600 EST acetaminophen (TYLENOL) tablet 650 mg -- Oral Dose Auto Held Automatic Transfer Provider     01/20/2023 0000 EST acetaminophen (TYLENOL) tablet 650 mg -- Oral Dose Auto Held Automatic Transfer Provider     01/19/2023 1800 EST acetaminophen (TYLENOL) tablet 650 mg -- Oral Dose Auto Held Automatic Transfer Provider     01/19/2023 1200 EST acetaminophen (TYLENOL) tablet 650 mg -- Oral Dose Auto Held Automatic Transfer Provider     01/19/2023 0600 EST acetaminophen (TYLENOL) tablet 650 mg -- Oral Dose Auto Held Automatic Transfer Provider     01/19/2023 0000 EST acetaminophen (TYLENOL) tablet 650 mg -- Oral Dose Auto Held Automatic Transfer Provider     01/18/2023 1800 EST acetaminophen (TYLENOL) tablet 650 mg -- Oral Dose Auto Held Automatic Transfer Provider     01/18/2023 1200 EST acetaminophen (TYLENOL) tablet 650 mg -- Oral Dose Auto Held Automatic Transfer Provider     01/18/2023 0600 EST acetaminophen (TYLENOL) tablet 650 mg -- Oral Dose Auto Held Automatic Transfer Provider     01/18/2023 0000 EST acetaminophen (TYLENOL) tablet 650 mg -- Oral Dose Auto Held Automatic Transfer Provider     01/17/2023 1800 EST acetaminophen (TYLENOL) tablet 650 mg -- Oral Dose Auto Held Automatic Transfer Provider     01/17/2023 1200 EST acetaminophen (TYLENOL) tablet 650 mg -- Oral Dose Auto Held Automatic Transfer Provider     01/17/2023 0600 EST acetaminophen (TYLENOL) tablet 650 mg -- Oral Dose Auto Held Automatic Transfer Provider     01/17/2023 0000 EST acetaminophen (TYLENOL) tablet 650 mg -- Oral Dose Auto Held Automatic Transfer Provider     01/16/2023 1800 EST acetaminophen (TYLENOL) tablet 650 mg -- Oral Dose Auto Held Automatic Transfer Provider     01/16/2023 1200 EST acetaminophen (TYLENOL) tablet 650 mg -- Oral Dose Auto Held Automatic Transfer Provider     01/16/2023 1048 EST acetaminophen (TYLENOL) tablet 650 mg -- Oral MAR Hold Automatic Transfer Provider     01/16/2023 0528 EST acetaminophen (TYLENOL) tablet 650 mg 650 mg Oral Refused Ty Traylor RN     01/15/2023 2352 EST acetaminophen (TYLENOL) tablet 650 mg 650 mg Oral Refused Ty Traylor RN     01/15/2023 1718 EST acetaminophen (TYLENOL) tablet 650 mg 650 mg Oral Not Given Madonna Aguilar     01/15/2023 1200 EST acetaminophen (TYLENOL) tablet 650 mg 0 mg Oral Hold Madonna Aguilar     01/15/2023 1425 EST indomethacin (INDOCIN) rectal suppository 100 mg Rectal Given Michael Lagunas MD     01/15/2023 1459 EST iohexol (OMNIPAQUE) 300 mg/mL injection 8 mL Other Given Michael Lagunas MD     01/16/2023 1215 EST ceFAZolin (ANCEF) IVPB (premix in dextrose) 2,000 mg 50 mL -- Intravenous Dose Auto Held Automatic Transfer Provider     01/16/2023 1048 EST ceFAZolin (ANCEF) IVPB (premix in dextrose) 2,000 mg 50 mL -- Intravenous MAR Hold Automatic Transfer Provider     01/16/2023 1215 EST metroNIDAZOLE (FLAGYL) IVPB (premix) 500 mg 100 mL -- Intravenous Dose Auto Held Automatic Transfer Provider     01/16/2023 1048 EST metroNIDAZOLE (FLAGYL) IVPB (premix) 500 mg 100 mL -- Intravenous MAR Hold Automatic Transfer Provider     01/16/2023 9436 EST potassium chloride (K-DUR,KLOR-CON) CR tablet 40 mEq 40 mEq Oral Given COLBY Pena          Objective:     Vitals: Blood pressure 109/57, pulse 60, temperature (!) 97 °F (36 1 °C), temperature source Tympanic, resp  rate 16, height 5' 1" (1 549 m), weight 67 kg (147 lb 11 3 oz), SpO2 95 %  ,Body mass index is 27 91 kg/m²        Intake/Output Summary (Last 24 hours) at 1/16/2023 1049  Last data filed at 1/16/2023 0800  Gross per 24 hour   Intake 5083 33 ml   Output --   Net 5083 33 ml       Physical Exam:   General Appearance: Awake and alert, in no acute distress  Abdomen: Soft, non-tender, non-distended; bowel sounds normal; no masses or no organomegaly    Invasive Devices     Peripheral Intravenous Line  Duration           Peripheral IV 01/15/23 Distal;Right;Ventral (anterior) Forearm <1 day                Lab Results:  Admission on 01/13/2023   Component Date Value   • WBC 01/13/2023 6 92    • RBC 01/13/2023 3 64 (L)    • Hemoglobin 01/13/2023 10 7 (L)    • Hematocrit 01/13/2023 32 9 (L)    • MCV 01/13/2023 90    • MCH 01/13/2023 29 4    • MCHC 01/13/2023 32 5    • RDW 01/13/2023 13 2    • MPV 01/13/2023 9 8    • Platelets 71/73/7027 302    • nRBC 01/13/2023 0    • Neutrophils Relative 01/13/2023 71    • Immat GRANS % 01/13/2023 0    • Lymphocytes Relative 01/13/2023 20    • Monocytes Relative 01/13/2023 7    • Eosinophils Relative 01/13/2023 1    • Basophils Relative 01/13/2023 1    • Neutrophils Absolute 01/13/2023 4 93    • Immature Grans Absolute 01/13/2023 0 02    • Lymphocytes Absolute 01/13/2023 1 35    • Monocytes Absolute 01/13/2023 0 49    • Eosinophils Absolute 01/13/2023 0 09    • Basophils Absolute 01/13/2023 0 04    • Sodium 01/13/2023 141    • Potassium 01/13/2023 3 2 (L)    • Chloride 01/13/2023 107    • CO2 01/13/2023 27    • ANION GAP 01/13/2023 7    • BUN 01/13/2023 11    • Creatinine 01/13/2023 0 51 (L)    • Glucose 01/13/2023 123    • Calcium 01/13/2023 9 1    • AST 01/13/2023 228 (H)    • ALT 01/13/2023 367 (H)    • Alkaline Phosphatase 01/13/2023 557 (H)    • Total Protein 01/13/2023 7 2    • Albumin 01/13/2023 3 6    • Total Bilirubin 01/13/2023 2 20 (H)    • eGFR 01/13/2023 106    • Lipase 01/13/2023 201    • hs TnI 0hr 01/13/2023 6    • hs TnI 2hr 01/14/2023 7    • Delta 2hr hsTnI 01/14/2023 1    • Sodium 01/14/2023 145    • Potassium 01/14/2023 3 5    • Chloride 01/14/2023 112 (H)    • CO2 01/14/2023 27    • ANION GAP 01/14/2023 6    • BUN 01/14/2023 7    • Creatinine 01/14/2023 0 51 (L)    • Glucose 01/14/2023 116    • Calcium 01/14/2023 8 8    • Corrected Calcium 01/14/2023 9 5    • AST 01/14/2023 238 (H)    • ALT 01/14/2023 361 (H)    • Alkaline Phosphatase 01/14/2023 539 (H)    • Total Protein 01/14/2023 6 6    • Albumin 01/14/2023 3 1 (L)    • Total Bilirubin 01/14/2023 1 12 (H)    • eGFR 01/14/2023 106    • WBC 01/14/2023 5 98    • RBC 01/14/2023 3 68 (L)    • Hemoglobin 01/14/2023 10 8 (L)    • Hematocrit 01/14/2023 34 0 (L)    • MCV 01/14/2023 92    • MCH 01/14/2023 29 3    • MCHC 01/14/2023 31 8    • RDW 01/14/2023 13 1    • MPV 01/14/2023 10 1    • Platelets 91/81/0896 309    • nRBC 01/14/2023 0    • Neutrophils Relative 01/14/2023 65    • Immat GRANS % 01/14/2023 0    • Lymphocytes Relative 01/14/2023 25    • Monocytes Relative 01/14/2023 7    • Eosinophils Relative 01/14/2023 2    • Basophils Relative 01/14/2023 1    • Neutrophils Absolute 01/14/2023 3 91    • Immature Grans Absolute 01/14/2023 0 01    • Lymphocytes Absolute 01/14/2023 1 50    • Monocytes Absolute 01/14/2023 0 42    • Eosinophils Absolute 01/14/2023 0 10    • Basophils Absolute 01/14/2023 0 04    • Magnesium 01/14/2023 2 2    • Phosphorus 01/14/2023 3 4    • Sodium 01/15/2023 143    • Potassium 01/15/2023 3 5    • Chloride 01/15/2023 108    • CO2 01/15/2023 28    • ANION GAP 01/15/2023 7    • BUN 01/15/2023 7    • Creatinine 01/15/2023 0 47 (L)    • Glucose 01/15/2023 99    • Calcium 01/15/2023 8 9    • Corrected Calcium 01/15/2023 9 6    • AST 01/15/2023 96 (H)    • ALT 01/15/2023 256 (H)    • Alkaline Phosphatase 01/15/2023 451 (H)    • Total Protein 01/15/2023 6 5    • Albumin 01/15/2023 3 1 (L)    • Total Bilirubin 01/15/2023 1 17 (H)    • eGFR 01/15/2023 109    • WBC 01/15/2023 5 49    • RBC 01/15/2023 3 64 (L)    • Hemoglobin 01/15/2023 10 6 (L)    • Hematocrit 01/15/2023 33 0 (L)    • MCV 01/15/2023 91    • MCH 01/15/2023 29 1    • MCHC 01/15/2023 32 1    • RDW 01/15/2023 13 1    • MPV 01/15/2023 10 1    • Platelets 91/97/1194 298    • nRBC 01/15/2023 0    • Neutrophils Relative 01/15/2023 59    • Immat GRANS % 01/15/2023 0    • Lymphocytes Relative 01/15/2023 33    • Monocytes Relative 01/15/2023 6    • Eosinophils Relative 01/15/2023 1    • Basophils Relative 01/15/2023 1    • Neutrophils Absolute 01/15/2023 3 24    • Immature Grans Absolute 01/15/2023 0 01    • Lymphocytes Absolute 01/15/2023 1 82    • Monocytes Absolute 01/15/2023 0 30    • Eosinophils Absolute 01/15/2023 0 07    • Basophils Absolute 01/15/2023 0 05    • WBC 01/16/2023 7 66    • RBC 01/16/2023 3 62 (L)    • Hemoglobin 01/16/2023 10 9 (L)    • Hematocrit 01/16/2023 32 7 (L)    • MCV 01/16/2023 90    • MCH 01/16/2023 30 1    • MCHC 01/16/2023 33 3    • RDW 01/16/2023 13 0    • MPV 01/16/2023 10 2    • Platelets 60/23/4191 317    • nRBC 01/16/2023 0    • Neutrophils Relative 01/16/2023 75    • Immat GRANS % 01/16/2023 0    • Lymphocytes Relative 01/16/2023 17    • Monocytes Relative 01/16/2023 8    • Eosinophils Relative 01/16/2023 0    • Basophils Relative 01/16/2023 0    • Neutrophils Absolute 01/16/2023 5 71    • Immature Grans Absolute 01/16/2023 0 02    • Lymphocytes Absolute 01/16/2023 1 32    • Monocytes Absolute 01/16/2023 0 59    • Eosinophils Absolute 01/16/2023 0 00    • Basophils Absolute 01/16/2023 0 02    • Sodium 01/16/2023 141    • Potassium 01/16/2023 3 5    • Chloride 01/16/2023 107    • CO2 01/16/2023 27    • ANION GAP 01/16/2023 7    • BUN 01/16/2023 9    • Creatinine 01/16/2023 0 48 (L)    • Glucose 01/16/2023 89    • Calcium 01/16/2023 9 2    • Corrected Calcium 01/16/2023 10 0    • AST 01/16/2023 56 (H)    • ALT 01/16/2023 193 (H)    • Alkaline Phosphatase 01/16/2023 416 (H)    • Total Protein 01/16/2023 6 6    • Albumin 01/16/2023 3 0 (L)    • Total Bilirubin 01/16/2023 1 06 (H)    • eGFR 01/16/2023 108    • Magnesium 01/16/2023 2 0    • Phosphorus 01/16/2023 5 0 (H)        Imaging Studies: I have personally reviewed pertinent imaging studies

## 2023-01-16 NOTE — PLAN OF CARE
Problem: PAIN - ADULT  Goal: Verbalizes/displays adequate comfort level or baseline comfort level  Description: Interventions:  - Encourage patient to monitor pain and request assistance  - Assess pain using appropriate pain scale  - Administer analgesics based on type and severity of pain and evaluate response  - Implement non-pharmacological measures as appropriate and evaluate response  - Consider cultural and social influences on pain and pain management  - Notify physician/advanced practitioner if interventions unsuccessful or patient reports new pain  Outcome: Progressing     Problem: SAFETY ADULT  Goal: Patient will remain free of falls  Description: INTERVENTIONS:  - Educate patient/family on patient safety including physical limitations  - Instruct patient to call for assistance with activity   - Consult OT/PT to assist with strengthening/mobility   - Keep Call bell within reach  - Keep bed low and locked with side rails adjusted as appropriate  - Keep care items and personal belongings within reach  - Initiate and maintain comfort rounds  - Make Fall Risk Sign visible to staff  - Obtain necessary fall risk management equipment:   - Apply yellow socks and bracelet for high fall risk patients  - Consider moving patient to room near nurses station  Outcome: Progressing

## 2023-01-16 NOTE — ANESTHESIA PREPROCEDURE EVALUATION
Procedure:  CHOLECYSTECTOMY LAPAROSCOPIC (Abdomen)    Relevant Problems   ANESTHESIA (within normal limits)      CARDIO (within normal limits)      ENDO   (+) Subclinical hypothyroidism      GI/HEPATIC (within normal limits)      /RENAL (within normal limits)      HEMATOLOGY (within normal limits)      MUSCULOSKELETAL   (+) Rheumatoid arthritis of multiple sites with negative rheumatoid factor (HCC)      NEURO/PSYCH (within normal limits)      PULMONARY (within normal limits)      Digestive   (+) Periodontal disease   (+) Symptomatic cholelithiasis      S/p ERCP 1/15/2023    MRI Abdomen 1/14/2023:  1  Mild CBD dilatation secondary to 5-6 mm filling defect distal CBD consistent with choledocholithiasis      2  Distended gallbladder with cholelithiasis but no evidence for cholecystitis      3  Fibroid uterus  Lab Results   Component Value Date    WBC 7 66 01/16/2023    HGB 10 9 (L) 01/16/2023    HCT 32 7 (L) 01/16/2023    MCV 90 01/16/2023     01/16/2023     Lab Results   Component Value Date    SODIUM 141 01/16/2023    K 3 5 01/16/2023     01/16/2023    CO2 27 01/16/2023    BUN 9 01/16/2023    CREATININE 0 48 (L) 01/16/2023    GLUC 89 01/16/2023    CALCIUM 9 2 01/16/2023     No results found for: INR, PROTIME  Lab Results   Component Value Date    HGBA1C 6 2 (H) 09/13/2022          Physical Exam    Airway    Mallampati score: I  TM Distance: >3 FB  Neck ROM: full     Dental       Cardiovascular  Cardiovascular exam normal    Pulmonary  Pulmonary exam normal     Other Findings        Anesthesia Plan  ASA Score- 2     Anesthesia Type- general with ASA Monitors  Additional Monitors:   Airway Plan: ETT  Plan Factors-Exercise tolerance (METS): >4 METS  Chart reviewed  EKG reviewed  Imaging results reviewed  Existing labs reviewed  Patient summary reviewed  Induction- intravenous and rapid sequence induction  Postoperative Plan- Plan for postoperative opioid use   Planned trial extubation    Informed Consent- Anesthetic plan and risks discussed with patient  I personally reviewed this patient with the CRNA  Discussed and agreed on the Anesthesia Plan with the CRNA  Talha Odell

## 2023-01-16 NOTE — ANESTHESIA POSTPROCEDURE EVALUATION
Post-Op Assessment Note    CV Status:  Stable  Pain Score: 6    Pain management: adequate     Mental Status:  Awake and somnolent   Hydration Status:  Stable   PONV Controlled:  None   Airway Patency:  Patent      Post Op Vitals Reviewed: Yes      Staff: CRNA   Comments: Patient in PACU, airway patent, VSS  C/o abdominal pain and "a little bit" of nausea  Receiving medicine IVP for both complaints  No notable events documented      BP   119/66 (79)   Temp   98 6   Pulse   79   Resp   10   SpO2   97% on RA

## 2023-01-16 NOTE — PROGRESS NOTES
Updated friend Annia Tirado regarding surgery per patient's request  All questions answered       Ani Hill, DO

## 2023-01-16 NOTE — OP NOTE
OPERATIVE REPORT  PATIENT NAME: Sarai Garduno    :  1965  MRN: [id number]  Pt Location: [facility] OR ROOM 06    SURGERY DATE: 2023    Surgeon(s) and Role:     * Dottie Pham DO - Primary     * Sendy Castillo MD - Lola Cisse MD - Assisting    Preop Diagnosis:  Calculus of gallbladder with biliary obstruction but without cholecystitis [K80 21]  Choledocholithiasis [K80 50]  Symptomatic cholelithiasis [K80 20]    Post-Op Diagnosis Codes:     * Calculus of gallbladder with biliary obstruction but without cholecystitis [K80 21]     * Choledocholithiasis [K80 50]     * Symptomatic cholelithiasis [K80 20]    Procedure(s) (LRB):  CHOLECYSTECTOMY LAPAROSCOPIC (N/A)    Specimen(s):  ID Type Source Tests Collected by Time Destination   1 :  Tissue Gallbladder TISSUE EXAM Dottie Pham DO 2023 1156        Estimated Blood Loss:   50 mL    Drains:  * No LDAs found *    Anesthesia Type:   General    Operative Indications:  Calculus of gallbladder with biliary obstruction but without cholecystitis [K80 21]  Choledocholithiasis [K80 50]  Symptomatic cholelithiasis [K80 20]    Operative Findings:  - acute calculous cholecystitis  - lap bao performed uneventfully    Complications:   None    Procedure and Technique: This is a 61year old female who presents with choledocholithiasis and acute cholecystitis in need of laparoscopic cholecystectomy  All risks, benefits, and alternatives were discussed with the patient agreed with the plan of care as stated  Patient was identified by armband and verbal communication and brought back to the operating room  She was induced by general anesthesia via endotracheal intubation  Her abdomen was cleansed then subsequently prepped with ChloraPrep and draped in usual sterile fashion  A time-out was called to review the procedure and its details  Antibiotics were administered      A small incision was made at aleman's point after instillation of local anesthetic; we subsequently attempted to use a Veress needle to establish pneumoperitoneum but unfortunately did not feel that we were in a safe spot to insufflate given backpressures  At this point we then decided to cutdown at the supraumbilical region  A supraumbilical vertical midline incision was made and carried down to the fascia which was grasped, elevated, and divided to expose the peritoneal cavity  A 5 mm port was then inserted into the abdomen under direct vision  The laparoscope was inserted into the abdomen and the peritoneal cavity was inspected  There were no abnormalities  The gallbladder was inspected and did appear to be edematous and thickened  Subsequently the following ports were inserted under direct visualization along with local anesthetic in the typical fashion: a 12 mm epigastric port and two 5 mm ports along the right costal margin  The patient was placed in reverse Trendelenburg position with right side up  Omental attachments to the gallbladder were gently swept away until an atraumatic grasper could be used to retract the fundus of the gallbladder superiorly over the dome of the liver  Filmy adhesions between the gallbladder and omentum were also lysed with a combination of blunt dissection and electrocautery  The infundibulum was identified and subsequently retracted laterally towards the right lower quadrant using another grasper to expose Calot's triangle  The anterior and posterior peritoneum were incised with electrocautery  The triangle was dissected to expose the cystic duct lymph node, cystic artery, cystic duct, and cystic plate  Once these structures were carefully identified, the cystic duct was doubly clipped and divided  Following this, the cystic artery was likewise doubly clipped and divided  Electrocautery was then used to separate the peritoneal attachments between the gallbladder and its bed in the liver   There was a posterior arterial branch which also required clipping  There was unavoidable leakage of bile and stones from the gallbladder during its detachment from the liver  These were retrieved where possible  The gallbladder fossa and cystic artery were inspected to ensure no bleeding  Hemostasis was achieved with electrocautery and was excellent  The gallbladder, once freed, was placed in endoscopic retrieval bag and removed from the abdomen through the epigastric port  Specimen was sent to pathology  The fascia at the epigastric port was reapproximated using 0 Vicryl suture in an interrupted fashion  This required several interrupted sutures in order to reapproximate the defect, which was widened in order to allow our specimen through the incision  All incisions were closed using 4-0 Monocryl sutures in a subcuticular fashion  Sterile skin glue was applied  Patient was then woken from general anesthesia and brought to the PACU in stable condition  All instrument, needle, and sponge counts were correct at the conclusion of the case  Radiofrequency scanning was negative  Dr Bhargav Esparza was present for the entirety of the operation      Patient Disposition:  PACU         SIGNATURE: Justin Warren MD  DATE: January 16, 2023  TIME: 1:11 PM

## 2023-01-17 VITALS
SYSTOLIC BLOOD PRESSURE: 106 MMHG | TEMPERATURE: 97.5 F | OXYGEN SATURATION: 90 % | BODY MASS INDEX: 27.89 KG/M2 | HEIGHT: 61 IN | WEIGHT: 147.71 LBS | RESPIRATION RATE: 16 BRPM | DIASTOLIC BLOOD PRESSURE: 57 MMHG | HEART RATE: 96 BPM

## 2023-01-17 LAB
ANION GAP SERPL CALCULATED.3IONS-SCNC: 9 MMOL/L (ref 4–13)
BASOPHILS # BLD AUTO: 0.03 THOUSANDS/ÂΜL (ref 0–0.1)
BASOPHILS NFR BLD AUTO: 0 % (ref 0–1)
BUN SERPL-MCNC: 11 MG/DL (ref 5–25)
CALCIUM SERPL-MCNC: 8.9 MG/DL (ref 8.3–10.1)
CHLORIDE SERPL-SCNC: 102 MMOL/L (ref 96–108)
CO2 SERPL-SCNC: 25 MMOL/L (ref 21–32)
CREAT SERPL-MCNC: 0.64 MG/DL (ref 0.6–1.3)
EOSINOPHIL # BLD AUTO: 0 THOUSAND/ÂΜL (ref 0–0.61)
EOSINOPHIL NFR BLD AUTO: 0 % (ref 0–6)
ERYTHROCYTE [DISTWIDTH] IN BLOOD BY AUTOMATED COUNT: 13.4 % (ref 11.6–15.1)
GFR SERPL CREATININE-BSD FRML MDRD: 98 ML/MIN/1.73SQ M
GLUCOSE SERPL-MCNC: 96 MG/DL (ref 65–140)
HCT VFR BLD AUTO: 33.9 % (ref 34.8–46.1)
HGB BLD-MCNC: 11.2 G/DL (ref 11.5–15.4)
IMM GRANULOCYTES # BLD AUTO: 0.06 THOUSAND/UL (ref 0–0.2)
IMM GRANULOCYTES NFR BLD AUTO: 1 % (ref 0–2)
LYMPHOCYTES # BLD AUTO: 1.5 THOUSANDS/ÂΜL (ref 0.6–4.47)
LYMPHOCYTES NFR BLD AUTO: 11 % (ref 14–44)
MCH RBC QN AUTO: 29.9 PG (ref 26.8–34.3)
MCHC RBC AUTO-ENTMCNC: 33 G/DL (ref 31.4–37.4)
MCV RBC AUTO: 91 FL (ref 82–98)
MONOCYTES # BLD AUTO: 0.56 THOUSAND/ÂΜL (ref 0.17–1.22)
MONOCYTES NFR BLD AUTO: 4 % (ref 4–12)
NEUTROPHILS # BLD AUTO: 10.99 THOUSANDS/ÂΜL (ref 1.85–7.62)
NEUTS SEG NFR BLD AUTO: 84 % (ref 43–75)
NRBC BLD AUTO-RTO: 0 /100 WBCS
PLATELET # BLD AUTO: 295 THOUSANDS/UL (ref 149–390)
PMV BLD AUTO: 9.8 FL (ref 8.9–12.7)
POTASSIUM SERPL-SCNC: 3.3 MMOL/L (ref 3.5–5.3)
RBC # BLD AUTO: 3.74 MILLION/UL (ref 3.81–5.12)
SODIUM SERPL-SCNC: 136 MMOL/L (ref 135–147)
WBC # BLD AUTO: 13.14 THOUSAND/UL (ref 4.31–10.16)

## 2023-01-17 RX ORDER — OXYCODONE HYDROCHLORIDE 5 MG/1
2.5-5 TABLET ORAL EVERY 4 HOURS PRN
Qty: 20 TABLET | Refills: 0 | Status: SHIPPED | OUTPATIENT
Start: 2023-01-17 | End: 2023-01-20

## 2023-01-17 RX ORDER — METHOCARBAMOL 500 MG/1
500 TABLET, FILM COATED ORAL EVERY 6 HOURS
Qty: 20 TABLET | Refills: 0 | Status: SHIPPED | OUTPATIENT
Start: 2023-01-17 | End: 2023-01-23 | Stop reason: ALTCHOICE

## 2023-01-17 RX ADMIN — ACETAMINOPHEN 650 MG: 325 TABLET, FILM COATED ORAL at 11:24

## 2023-01-17 RX ADMIN — ACETAMINOPHEN 650 MG: 325 TABLET, FILM COATED ORAL at 05:55

## 2023-01-17 RX ADMIN — LEVOTHYROXINE SODIUM 25 MCG: 25 TABLET ORAL at 05:55

## 2023-01-17 RX ADMIN — ONDANSETRON 4 MG: 2 INJECTION INTRAMUSCULAR; INTRAVENOUS at 03:42

## 2023-01-17 RX ADMIN — HEPARIN SODIUM 5000 UNITS: 5000 INJECTION INTRAVENOUS; SUBCUTANEOUS at 05:56

## 2023-01-17 RX ADMIN — PANTOPRAZOLE SODIUM 40 MG: 40 TABLET, DELAYED RELEASE ORAL at 05:55

## 2023-01-17 RX ADMIN — HYDROMORPHONE HYDROCHLORIDE 0.5 MG: 1 INJECTION, SOLUTION INTRAMUSCULAR; INTRAVENOUS; SUBCUTANEOUS at 03:39

## 2023-01-17 RX ADMIN — SODIUM CHLORIDE, SODIUM LACTATE, POTASSIUM CHLORIDE, AND CALCIUM CHLORIDE 75 ML/HR: .6; .31; .03; .02 INJECTION, SOLUTION INTRAVENOUS at 03:25

## 2023-01-17 RX ADMIN — HYDROXYCHLOROQUINE SULFATE 200 MG: 200 TABLET ORAL at 08:10

## 2023-01-17 NOTE — DISCHARGE SUMMARY
Discharge Summary - [address] Ceasar Morrison 61 y o  female MRN: [id number]  [facility] Encounter: [id number]    Admission Date: 1/13/2023     Discharge Date: 1/17/2023    Admitting Diagnosis: Abdominal pain [R10 9]  Calculus of gallbladder with biliary obstruction but without cholecystitis [K80 21]    Discharge Diagnosis:     1  Acute calculous cholecystitis  2  Uterine fibroids  3  Right ovarian calcifications  Attending and Service: Dr Amanda Kelly, Acute Care Surgical Services  Consulting Physician(s): None  Imaging and Procedures Performed:   Orders Placed This Encounter   Procedures   • POC Biliary US       ERCP    Result Date: 1/15/2023  Impression: ERCP with sphincterotomy Stone in the distal CBD during cholangiogram, otherwise normal cholangiogram, partial filling of the cystic and GB Large stone removed using an extraction balloon RECOMMENDATION:  Follow up with PCP Can have clears today Proceed with cholecystectomy as per surgery  No Staff Documented Nazia Zavala MD     MRI abdomen wo contrast and mrcp    Result Date: 1/14/2023  Impression: 1  Mild CBD dilatation secondary to 5-6 mm filling defect distal CBD consistent with choledocholithiasis  2  Distended gallbladder with cholelithiasis but no evidence for cholecystitis  3  Fibroid uterus  The study was marked in [facility] for immediate notification  Workstation performed: [id number]     FL ERCP biliary only    Result Date: 1/16/2023  Impression: Choledocholithiasis and mild biliary ductal dilatation status post stone and sludge removal following stent replaced in balloon sweeping  Cholelithiasis  Workstation performed: [id number]     US right upper quadrant    Result Date: 1/13/2023  Impression: Gallstones are present  Sonographic Pimentel sign was negative however patient was given analgesia  Recommend clinical correlation  There is hepatomegaly   Workstation performed: [id number]     CT abdomen pelvis with contrast    Result Date: 1/14/2023  Impression: Myomatous uterus  Speckled calcifications are seen in the right ovary concerning for the possibility of a germ cell tumor  Recommend elective ultrasound for further evaluation  Workstation performed: [id number]       Laparoscopic cholecystectomy on 1/16/2023  Pathology: Final surgical pathology pending at the time of discharge  Hospital Course: Cindy Boyle is a 21-year-old female who presented with a 2-week history of right upper abdominal pain and associated intermittent nausea  She also related that she had had prior colicky symptoms after meals, but denied any fever, chills or vomiting  She had no history of urinary or bowel habit changes  On her initial surgical evaluation this presentation, she was afebrile with normal vital signs; she did have tenderness in her abdomen without peritonitis; the remainder of her exam is unremarkable  Her initial work-up included labs and the above-noted imaging studies  The patient was admitted to the acute care surgery service with acute calculous cholecystitis  The patient was kept NPO, started on IV fluids and IV antibiotics, and placed on a pain control regimen  Operative intervention was recommended to the patient and the patient agreed to proceed  The patient was then taken to the operating room for a laparoscopic cholecystectomy on 1/16/2023    The patient tolerated the procedure well, and there were no intraoperative complications  Postoperatively, the patient was advanced to a regular diet and tolerated it well  Once tolerating an oral diet well, the IV fluids were discontinued  The patient was transitioned to an oral pain medication regimen  By 1/17/2023, the patient is deemed stable for discharge home  Prior to discharge, the patient was notified about her incidental findings, including the calcifications on the right ovary concerning for possible germ cell tumor   She was instructed on need for short term follow up with PCP to arrange an outpatient pelvic ultrasound for further evaluation  She expressed understanding of the finding and recommended follow up plan  On discharge, the patient is instructed to follow-up with the patient's primary care provider within the next 2 weeks to review the events of the recent hospitalization  The patient is instructed to follow-up with the Acute Care Surgical Services as scheduled on 1/30/2023 at 9:00 AM  The patient is instructed to follow the provided discharge instructions  Condition at Discharge: good     Discharge instructions/Information to patient and family:   See after visit summary for information provided to patient and family  Provisions for Follow-Up Care:  See after visit summary for information related to follow-up care and any pertinent home health orders  Disposition: See After Visit Summary for discharge disposition information  Planned Readmission: No    Discharge Statement   I spent 30 minutes discharging the patient  This time was spent on the day of discharge  I had direct contact with the patient on the day of discharge  Additional documentation is required if more than 30 minutes were spent on discharge  Discharge Medications:  See after visit summary for reconciled discharge medications provided to patient and family      Mahesh Ware PA-C  1/17/2023  09:52 AM

## 2023-01-17 NOTE — PROGRESS NOTES
Progress Note - Shawn Nunes 61 y o  female MRN: [id number]    Unit/Bed#: [facility] 820-01 Encounter: [id number]      Assessment:  61 y o female p/w choledocholithiasis s/p ERCP with sphincterotomy on 1/15 and s/p lap bao on 1/16 POD#1  Plan:  -Wean off Oxygen as tolerated   -DC IVF  -Pain and nausea control  -Encourage ambulation and ICS   -Plan for DC home today    Subjective:   Patient was seen and examined at bedside  Overnight was placed on 2L on NC  Voiding without problems  Passing flatus  Ambulated to the bathroom  Tolerating low fat reg diet, had chicken for dinner without nausea or vomiting  Admits to some nausea after liquids that is manageable with antinausea medication  Complains of gas pains, r  Shoulder pain  and some soreness when taking deep breaths  Denies CP, SOB, leg swelling, leg pain, fevers, chills  Objective:     Vitals: Blood pressure 109/60, pulse 97, temperature 98 4 °F (36 9 °C), resp  rate 16, height 5' 1" (1 549 m), weight 67 kg (147 lb 11 3 oz), SpO2 90 %  ,Body mass index is 27 91 kg/m²  Intake/Output Summary (Last 24 hours) at 1/17/2023 0544  Last data filed at 1/17/2023 0325  Gross per 24 hour   Intake 2782 08 ml   Output 825 ml   Net 1957 08 ml       Physical Exam:    General: NAD   HEENT: atraumatic  Neuro: AAOx3  CV:  Regular rate   Lungs: In no acute respiratory distress   Abd: soft, mild diffuse tenderness, mild RUQ distension  Incisions are c/d/i   MSK: without edema  Skin:dry    Invasive Devices     Peripheral Intravenous Line  Duration           Peripheral IV 01/15/23 Distal;Right;Ventral (anterior) Forearm 1 day                Lab, Imaging and other studies: I have personally reviewed pertinent reports      VTE Pharmacologic Prophylaxis: Heparin  VTE Mechanical Prophylaxis: sequential compression device     Joey Ponce MS4

## 2023-01-17 NOTE — RESTORATIVE TECHNICIAN NOTE
Restorative Technician Note      Patient Name: Noemi Chiu [facility]     Restorative Tech Visit Date: 01/17/23  Note Type: Mobility  Patient Position Upon Consult: Seated edge of bed  Activity Performed: Ambulated  Assistive Device: Other (Comment) (no AD)  Education Provided: Yes  Patient Position at End of Consult: Seated edge of bed;  All needs within reach    O2 monitored - SpO2 - 94-96% throughout     Terry Rae  DPT, Restorative Technician

## 2023-01-17 NOTE — DISCHARGE INSTR - AVS FIRST PAGE
Acute Care Surgery Discharge Instructions    Please follow-up as instructed  If you do not already have a follow-up appointment, please call the office when you leave to schedule an appointment to be seen in 2-3 weeks for post-operative re-evaluation  Activity:  - No lifting greater than 20 pounds or strenuous physical activity or exercise for 2 weeks  - Walking and normal light activities are encouraged  - Normal daily activities including climbing steps are okay  - No driving until no longer using pain medications  Return to work:    - You may return to work in 2 weeks or sooner if you are feeling well enough  Diet:    - You may resume your normal diet  Wound Care:  - May shower daily  No tub baths or swimming until cleared by your surgeon   - Wash incision gently with soap and water and pat dry  - Do not apply any creams or ointments unless instructed to do so by your surgeon   - Jorge Luis Stanley may apply ice as needed (no longer than 20 minutes at a time) for the first 48 hours  Medications:    - You may resume all of your regular medications after discharge unless otherwise instructed  Please refer to your discharge medication list for further details  - Please take the pain medications as directed  - You are encouraged to use non-narcotic pain medications first and whenever possible  Reserve the use of narcotic pain medication for moderate to severe pain not controlled by non-narcotic medications   - No driving while taking narcotic pain medications  - You may become constipated, especially if taking pain medications  You may take any over the counter stool softeners or laxatives as needed  Examples: Milk of Magnesia, Colace, Senna      Additional Instructions:  - If you have any questions or concerns after discharge please call the office   - Call office or return to ER if fever greater than 101, chills, persistent nausea/vomiting, worsening/uncontrollable pain, and/or increasing redness or purulent/foul smelling drainage from incision(s)

## 2023-01-17 NOTE — INCIDENTAL FINDINGS
The following findings require follow up:  Radiographic finding     Finding and Follow up required:       1) There were speckled calcifications are seen in the right ovary concerning for the possibility of a germ cell tumor incidentally identified on your imaging studies  A malignancy (cancer) cannot be completely excluded based on trauma imaging alone  Recommend short-term outpatient follow-up with primary care provider to review the finding and for further surveillance as indicated  Recommend elective pelvic ultrasound for further evaluation  2) Multiple uterine fibroids noted including a suspected 3 cm subserosal right uterine body fibroid incidentally identified on your imaging studies  A malignancy (cancer) cannot be completely excluded based on trauma imaging alone  Recommend short-term outpatient follow-up with primary care provider to review the finding and for further surveillance as indicated  3) Right middle lobe subcentimeter granuloma incidentally identified on your imaging studies  A malignancy (cancer) cannot be completely excluded based on trauma imaging alone  Recommend short-term outpatient follow-up with primary care provider to review the finding and for further surveillance as indicated  4) There is a small hiatal hernia incidentally identified on your imaging studies  No further work up or intervention necessary at this time  Recommend outpatient follow-up with primary care provider to review the finding and for further surveillance as indicated  Follow up should be done within 2 week(s)     The above noted incidental findings were discussed with the patient via a Turkish interpretor  The patient demonstrated understanding of the findings and the follow-up recommendations      Please notify the following clinician to assist with the follow up:   Primary Care Provider at [facility], [facility]

## 2023-01-18 ENCOUNTER — TRANSITIONAL CARE MANAGEMENT (OUTPATIENT)
Dept: INTERNAL MEDICINE CLINIC | Facility: CLINIC | Age: 58
End: 2023-01-18

## 2023-01-23 ENCOUNTER — OFFICE VISIT (OUTPATIENT)
Dept: INTERNAL MEDICINE CLINIC | Facility: CLINIC | Age: 58
End: 2023-01-23

## 2023-01-23 ENCOUNTER — APPOINTMENT (OUTPATIENT)
Dept: LAB | Facility: CLINIC | Age: 58
End: 2023-01-23

## 2023-01-23 VITALS
DIASTOLIC BLOOD PRESSURE: 72 MMHG | WEIGHT: 145.2 LBS | HEART RATE: 76 BPM | HEIGHT: 61 IN | OXYGEN SATURATION: 98 % | TEMPERATURE: 97.7 F | SYSTOLIC BLOOD PRESSURE: 113 MMHG | BODY MASS INDEX: 27.41 KG/M2

## 2023-01-23 DIAGNOSIS — R73.03 PREDIABETES: ICD-10-CM

## 2023-01-23 DIAGNOSIS — E03.8 SUBCLINICAL HYPOTHYROIDISM: ICD-10-CM

## 2023-01-23 DIAGNOSIS — D49.59 GERM CELL TUMOR OF OVARY: ICD-10-CM

## 2023-01-23 DIAGNOSIS — J84.10 LUNG GRANULOMA (HCC): ICD-10-CM

## 2023-01-23 DIAGNOSIS — M06.09 RHEUMATOID ARTHRITIS OF MULTIPLE SITES WITH NEGATIVE RHEUMATOID FACTOR (HCC): ICD-10-CM

## 2023-01-23 DIAGNOSIS — Z90.49 STATUS POST LAPAROSCOPIC CHOLECYSTECTOMY: Primary | ICD-10-CM

## 2023-01-23 LAB
T4 FREE SERPL-MCNC: 1.15 NG/DL (ref 0.76–1.46)
TSH SERPL DL<=0.05 MIU/L-ACNC: 5.27 UIU/ML (ref 0.45–4.5)

## 2023-01-23 NOTE — PROGRESS NOTES
Assessment & Plan     1  Status post laparoscopic cholecystectomy  Patient was admitted with RUQ abdominal pain   Was found to have acute calculous cholecystitis and underwent laparoscopic cholecystectomy on 01/16/2023  Patient tolerated the procedure well without any intra/post operative complications and was discharged the next day on 01/17/2023    Patient has been doing well since the discharge  No significant pain, nausea, vomiting, fever, chills  She mentions that she has been gradually advancing her diet and is eating small amounts at regular intervals and tolerating it well  She mentions some constipation initially but has been gradually improving  No evidence of bleeding/infection at surgical site    Advised patient to reach out to clinic if she starts getting intolerant to certain food especially fat containing      2  Rheumatoid arthritis of multiple sites with negative rheumatoid factor ([facility] [address] )  Patient recently diagnosed with RA with Positive CCP, negative RF   Started on Hydroxychoroquine 200 mg OD   Patient mentions not taking due to GI upset (acid reflux)  Advised patient to taking medication with food and start Omeprazole 40 mg empty stomach in the morning   Call the clinic if persistent symptoms    3  Subclinical hypothyroidism  Patient was diagnosed with symptomatic (weight gain, paraesthesias) subclinical hypothyroidism and started on Levothyroxine 50 mcg daily  Recent TSH in 09/2022 was 0 117  Levothyroxine was decreased to 25 mcg daily  Will repeat TSH and adjust dose as necessary    -     TSH, 3rd generation with Free T4 reflex; Future    4  Germ cell tumor of ovary  Incidental findings on recent CTAP : Myomatous uterus  Speckled calcifications are seen in the right ovary concerning for the possibility of a germ cell tumor  Recommend elective ultrasound for further evaluation      Will refer to OBGYN for further evaluation and management    -     Ambulatory Referral to Obstetrics / Gynecology; Future    5  Lung granuloma ([facility] [address] )  Incident finding of subcentimeter granuloma  Reassured patient of benign finding and no follow up needed    6  Prediabetes  Recent Hba1c : 6 2  Educated patient on diagnoses of pre-diabetes and risk of progression to diabetes  Educated her on diet and exercise  Will recheck Hba1c at next visit    BMI Counseling: Body mass index is 27 44 kg/m²  The BMI is above normal  Nutrition recommendations include reducing portion sizes, decreasing overall calorie intake, 3-5 servings of fruits/vegetables daily, reducing fast food intake, consuming healthier snacks, decreasing soda and/or juice intake, moderation in carbohydrate intake and increasing intake of lean protein  Exercise recommendations include moderate aerobic physical activity for 150 minutes/week and exercising 3-5 times per week  Next scheduled follow up in 6 months    Subjective     Transitional Care Management Review:   Cindy Boyle is a 61 y o  female here for TCM follow up  During the TCM phone call patient stated:  TCM Call     Date and time call was made  1/19/2023  4:10 PM    Hospital care reviewed  Records reviewed    Patient was hospitialized at  [facility]    Date of Admission  01/13/23    Date of discharge  01/17/23    Diagnosis  Symptomatic cholethiasis- K80 20    Disposition  Home    Were the patients medications reviewed and updated  Yes    Current Symptoms  None      TCM Call     Post hospital issues  None    Should patient be enrolled in anticoag monitoring? No    Scheduled for follow up?   Yes  1/23/2023    Did you obtain your prescribed medications  Yes    Do you need help managing your prescriptions or medications  No    Is transportation to your appointment needed  Yes    Specify why  [employer]    I have advised the patient to call PCP with any new or worsening symptoms  DENNYS CHANG MA    Counseling  Patient    Counseling topics  instructions for management; Importance of RX compliance        Emilie Shane  Ravinder Shu is a 57/F with PMH of subclinical hypothyroidism, hyperlipidemia, rheumatoid arthritis  Patient was recently admitted with RUQ abdominal pain and was found to have acute calculous cholecystitis and underwent laparoscopic cholecystectomy on 01/16/2023  Patient tolerated the procedure well without any intra/post operative complications and was discharged the next day on 01/17/2023  Patient has been doing well since the discharge  No significant pain, nausea, vomiting, fever, chills  She mentions that she has been gradually advancing her diet and is eating small amounts at regular intervals and tolerating it well  She mentions some constipation initially but has been gradually improving  No other complaints at the moment  Review of Systems   Constitutional: Negative for activity change, appetite change, chills, fatigue and fever  HENT: Negative for trouble swallowing and voice change  Respiratory: Negative for cough, shortness of breath and wheezing  Cardiovascular: Negative for chest pain, palpitations and leg swelling  Gastrointestinal: Negative for abdominal distention, abdominal pain, blood in stool, constipation, diarrhea, nausea and vomiting  Endocrine: Negative for polydipsia, polyphagia and polyuria  Genitourinary: Negative for difficulty urinating  Musculoskeletal: Positive for arthralgias  Negative for back pain  Skin: Negative for color change and pallor  Allergic/Immunologic: Negative for immunocompromised state  Neurological: Negative for dizziness and light-headedness  Hematological: Negative for adenopathy  Does not bruise/bleed easily  Psychiatric/Behavioral: Negative for agitation and behavioral problems         Objective     /72 (BP Location: Right arm, Patient Position: Sitting, Cuff Size: Standard)   Pulse 76   Temp 97 7 °F (36 5 °C) (Temporal)   Ht 5' 1" (1 549 m)   Wt 65 9 kg (145 lb 3 2 oz) SpO2 98%   BMI 27 44 kg/m²      Physical Exam  Vitals reviewed  Constitutional:       General: She is not in acute distress  Appearance: Normal appearance  She is obese  She is not ill-appearing  HENT:      Head: Normocephalic and atraumatic  Mouth/Throat:      Mouth: Mucous membranes are moist       Pharynx: Oropharynx is clear  Eyes:      Conjunctiva/sclera: Conjunctivae normal       Pupils: Pupils are equal, round, and reactive to light  Cardiovascular:      Rate and Rhythm: Normal rate and regular rhythm  Pulses: Normal pulses  Heart sounds: Normal heart sounds  Pulmonary:      Effort: Pulmonary effort is normal       Breath sounds: Normal breath sounds  Abdominal:      General: Abdomen is flat  Bowel sounds are normal  There is no distension  Palpations: Abdomen is soft  There is no mass  Tenderness: There is no abdominal tenderness  There is no guarding or rebound  Hernia: No hernia is present  Comments: 3 laproscopic entry points noted; intact; no active bleeding/discharge; no tenderness    Musculoskeletal:      Right lower leg: No edema  Left lower leg: No edema  Skin:     General: Skin is warm  Capillary Refill: Capillary refill takes less than 2 seconds  Coloration: Skin is not jaundiced or pale  Neurological:      General: No focal deficit present  Mental Status: She is alert and oriented to person, place, and time     Psychiatric:         Mood and Affect: Mood normal          Behavior: Behavior normal        Medications have been reviewed by provider in current encounter    Amilcar Grant MD

## 2023-01-30 ENCOUNTER — OFFICE VISIT (OUTPATIENT)
Dept: OBGYN CLINIC | Facility: CLINIC | Age: 58
End: 2023-01-30

## 2023-01-30 ENCOUNTER — OFFICE VISIT (OUTPATIENT)
Dept: SURGERY | Facility: CLINIC | Age: 58
End: 2023-01-30

## 2023-01-30 VITALS
TEMPERATURE: 97.5 F | WEIGHT: 144.8 LBS | HEIGHT: 61 IN | HEART RATE: 83 BPM | BODY MASS INDEX: 27.34 KG/M2 | SYSTOLIC BLOOD PRESSURE: 124 MMHG | DIASTOLIC BLOOD PRESSURE: 78 MMHG

## 2023-01-30 VITALS
BODY MASS INDEX: 26.81 KG/M2 | WEIGHT: 142 LBS | HEIGHT: 61 IN | DIASTOLIC BLOOD PRESSURE: 82 MMHG | HEART RATE: 89 BPM | SYSTOLIC BLOOD PRESSURE: 129 MMHG

## 2023-01-30 DIAGNOSIS — D49.59 GERM CELL TUMOR OF OVARY: ICD-10-CM

## 2023-01-30 DIAGNOSIS — K80.20 SYMPTOMATIC CHOLELITHIASIS: Primary | ICD-10-CM

## 2023-01-30 NOTE — PROGRESS NOTES
[address] [address]  Phone#  [phone]  Fax#  [phone]  [address] VISIT      Sonny Lazo is a 61 y o  female here for incidental finding of ovarian calcifications  Patient underwent a lap bao due to symptomatic cholelithiasis , during her presurgical evaluation ct scan was performed with finding of : "Myomatous uterus  Speckled calcifications are seen in the right ovary concerning for the possibility of a germ cell tumor"  Today patient denies pelvic pain or postmenopausal bleeding in relation to fibroid uterus  Patient denies family history of cancer, bloating or pelvic pain        Personal health questionnaire reviewed: yes  Gynecologic History  No LMP recorded  Patient is postmenopausal   Contraception: none  Last Pap:   Results were: normal  Last mammogram:   Results were: normal    Obstetric History  OB History    Para Term  AB Living   2 1 1   1 1   SAB IAB Ectopic Multiple Live Births                  # Outcome Date GA Lbr Alek/2nd Weight Sex Delivery Anes PTL Lv   2 AB            1 Term      CS-LTranv            The following portions of the patient's history were reviewed and updated as appropriate: allergies, past family history and problem list     Review of Systems  Pertinent items are noted in HPI        Objective     /82   Pulse 89   Ht 5' 1" (1 549 m)   Wt 64 4 kg (142 lb)   BMI 26 83 kg/m²     General Appearance:    Alert, cooperative, no distress, appears stated age   Head:    Normocephalic, without obvious abnormality, atraumatic   Eyes:    PERRL, conjunctiva/corneas clear, EOM's intact, fundi     benign, both eyes   Ears:    Normal TM's and external ear canals, both ears   Nose:   Nares normal, septum midline, mucosa normal, no drainage    or sinus tenderness   Throat:   Lips, mucosa, and tongue normal; teeth and gums normal   Neck:   Supple, symmetrical, trachea midline, no adenopathy;     thyroid:  no enlargement/tenderness/nodules; no carotid    bruit or JVD   Back:     Symmetric, no curvature, ROM normal, no CVA tenderness   Lungs:     Clear to auscultation bilaterally, respirations unlabored   Chest Wall:    No tenderness or deformity    Heart:    Regular rate and rhythm, S1 and S2 normal, no murmur, rub   or gallop   Breast Exam:    No tenderness, masses, or nipple abnormality   Abdomen:     Soft, non-tender, bowel sounds active all four quadrants,     no masses, no organomegaly   Genitalia:    Normal female without lesion, enlarged uterus 14 weeks size, no fullness, no adnexal mass palpated    Rectal:    Normal tone, no masses or tenderness; guaiac negative stool   Extremities:   Extremities normal, atraumatic, no cyanosis or edema   Pulses:   2+ and symmetric all extremities   Skin:   Skin color, texture, turgor normal, no rashes or lesions   Lymph nodes:   Cervical, supraclavicular, and axillary nodes normal   Neurologic:   CNII-XII intact, normal strength, sensation and reflexes     throughout         Assessment/Plan    Ovarian calcification   · Incidental finding on CT    · "Myomatous uterus  Speckled calcifications are seen in the right ovary concerning for the possibility of a germ cell tumor"  Today patient denies pelvic pain or postmenopausal bleeding in relation to fibroid uterus  Patient denies family history of cancer, bloating or pelvic pain   · Pelvic ultrasound has been discussed with patient, order placed  · Patient is anxious about possibility of malignancy  I have discussed with patient will review ultrasound results in order to define management  · Encouraged to get it done ASAP  · In addition patient without current insurance,  has been contacted in case of surgical management be necessary   All questions have been answered to her satisfaction      D/w [address], MD

## 2023-01-30 NOTE — PROGRESS NOTES
Office Visit - [address] MRN: [id number]  Encounter: [id number]    Assessment and Plan  Problem List Items Addressed This Visit        Digestive    Symptomatic cholelithiasis - Primary     S/p lap bao   - doing well, pain controlled  - path reviewed with patient  - f/u PRN            Chief Complaint:  Carlos Manuel Jorgensen is a 61 y o  female who presents for Post-op (P/o lap bao)    Subjective  62 yo female s/p lap bao who presents for follow up evaluation  No complaints, no fevers/chills/n/v  Tolerating diet  (+)BM/flatus    Past Medical History:   Diagnosis Date   • Hypothyroidism    • Periodontal disease 2022       Past Surgical History:   Procedure Laterality Date   •  SECTION     • CHOLECYSTECTOMY LAPAROSCOPIC N/A 2023    Procedure: CHOLECYSTECTOMY LAPAROSCOPIC;  Surgeon: Nahid Wallace DO;  Location: [facility];  Service: General   • TUBAL LIGATION         Family History   Problem Relation Age of Onset   • Hypertension Mother    • Diabetes Father        Social History     Tobacco Use   • Smoking status: Never   • Smokeless tobacco: Never   Vaping Use   • Vaping Use: Never used   Substance Use Topics   • Alcohol use: Not Currently     Comment: social   • Drug use: Never        Medications  Current Outpatient Medications on File Prior to Visit   Medication Sig Dispense Refill   • hydroxychloroquine (PLAQUENIL) 200 mg tablet Take 1 tablet (200 mg total) by mouth daily with breakfast 90 tablet 2   • levothyroxine (Euthyrox) 25 mcg tablet Take 1 tablet (25 mcg total) by mouth daily in the early morning 30 tablet 5   • omeprazole (PriLOSEC) 40 MG capsule Take 1 capsule (40 mg total) by mouth daily 14 capsule 0     No current facility-administered medications on file prior to visit  Allergies  No Known Allergies    Review of Systems   Constitutional: Negative  Respiratory: Negative  Cardiovascular: Negative      Gastrointestinal: Negative  Musculoskeletal: Negative  Skin: Negative  All other systems reviewed and are negative  Objective  Vitals:    01/30/23 0843   BP: 124/78   Pulse: 83   Temp: 97 5 °F (36 4 °C)       Physical Exam  Constitutional:       Appearance: Normal appearance  She is normal weight  Cardiovascular:      Rate and Rhythm: Normal rate  Pulses: Normal pulses  Pulmonary:      Effort: Pulmonary effort is normal    Abdominal:      General: There is no distension  Palpations: Abdomen is soft  Tenderness: There is no abdominal tenderness  There is no guarding or rebound  Comments: Incisions well healed, no erythema/fluctuance/drainage   Neurological:      Mental Status: She is alert  PMH of vascular dementia daughter unclear if CVA in the past.   - Baseline AAOx1 HCP daughter  - F/u PT     #AMS/ Stroke R/o   Pt found to be more altered and endorsing severe HA worst in her life s/p stroke code and CT imaging negative for an acute process. Patient likely altered secondary to baseline dementia vs sepsis. Today pt endorses mild headache.  - Per neuro if pt continues to have severe HA, consider LP to r/o SAH

## 2023-02-02 DIAGNOSIS — E03.9 HYPOTHYROIDISM, UNSPECIFIED TYPE: ICD-10-CM

## 2023-02-02 RX ORDER — LEVOTHYROXINE SODIUM 0.03 MG/1
37.5 TABLET ORAL
Qty: 30 TABLET | Refills: 5 | Status: SHIPPED | OUTPATIENT
Start: 2023-02-02

## 2023-02-03 ENCOUNTER — TELEPHONE (OUTPATIENT)
Dept: INTERNAL MEDICINE CLINIC | Facility: CLINIC | Age: 58
End: 2023-02-03

## 2023-02-03 NOTE — TELEPHONE ENCOUNTER
----- Message from Ave Nieto MD sent at 2/2/2023 10:38 AM EST -----  Can someone let the patient know that her TSH has is mildly elevated and we will have to increase her Levothyroxine dose from 25 mcg (1 tablet) to 37 5 mcg (1 5 tablet) daily    Thanks

## 2023-02-06 ENCOUNTER — HOSPITAL ENCOUNTER (OUTPATIENT)
Dept: RADIOLOGY | Facility: HOSPITAL | Age: 58
Discharge: HOME | End: 2023-02-06

## 2023-02-06 DIAGNOSIS — D49.59 GERM CELL TUMOR OF OVARY: ICD-10-CM

## 2023-02-07 ENCOUNTER — OFFICE VISIT (OUTPATIENT)
Dept: DENTISTRY | Facility: CLINIC | Age: 58
End: 2023-02-07

## 2023-02-07 VITALS — SYSTOLIC BLOOD PRESSURE: 109 MMHG | HEART RATE: 79 BPM | TEMPERATURE: 97.8 F | DIASTOLIC BLOOD PRESSURE: 67 MMHG

## 2023-02-07 DIAGNOSIS — Z01.20 ENCOUNTER FOR DENTAL EXAMINATION AND CLEANING WITHOUT ABNORMAL FINDINGS: Primary | ICD-10-CM

## 2023-02-07 NOTE — DENTAL PROCEDURE DETAILS
Periodontal maintenance  [facility]  Reviewed meds/hxx in Epic  ASA II    CC: 0    Perio charting completed  Perio findings: no pockets, gen adv recession  FUD and LPD cleaned in US w/ tartar and stain solution  Cavitron and handscale, polished and flossed  Gingival lingual of #23 and 27 bulbous, red  Recommend warm salt water rinses at night and discussed flossing nightly, showed proper technique with her remaining teeth  OH: Fair  OHI: reviewed importance of brushing 2x/day, flossing subgingivally daily and using a daily mouthrinse  Discussed importance of regular 3-4 month periodontal maintenance appts  Explained to the patient without good home care and irregular dental visits, this disease can return  Patient understands       NV: Herrera Longo, [employer], 2 PAs

## 2023-02-09 ENCOUNTER — TELEPHONE (OUTPATIENT)
Dept: LABOR AND DELIVERY | Facility: HOSPITAL | Age: 58
End: 2023-02-09

## 2023-02-09 NOTE — TELEPHONE ENCOUNTER
Called patient ans reviewed pelvic US report  We discussed her ovaries look normal and the previous mass described on CT scan was a uterine fibroid  Patient will return back for annual evaluation next Monday       All questions answered to her satisfaction    [facility]

## 2023-02-13 ENCOUNTER — OFFICE VISIT (OUTPATIENT)
Dept: OBGYN CLINIC | Facility: CLINIC | Age: 58
End: 2023-02-13

## 2023-02-13 VITALS
WEIGHT: 141 LBS | SYSTOLIC BLOOD PRESSURE: 126 MMHG | BODY MASS INDEX: 26.62 KG/M2 | HEART RATE: 78 BPM | HEIGHT: 61 IN | DIASTOLIC BLOOD PRESSURE: 68 MMHG

## 2023-02-13 DIAGNOSIS — D25.1 FIBROIDS, INTRAMURAL: Primary | ICD-10-CM

## 2023-02-13 NOTE — PROGRESS NOTES
[address] [address]  Phone#  [phone]  Fax#  [phone]  [address]  VISIT      Subjective     Salvador Watkins is a 61 y o  female here to discuss ultrasound results  Patient presented last 23 after incidental finding of ovarian calcifications  Patient underwent a lap bao due to symptomatic cholelithiasis , during her presurgical evaluation ct scan was performed with finding of : "Myomatous uterus  Speckled calcifications are seen in the right ovary concerning for the possibility of a germ cell tumor"  Pelvic ultrasound was ordered with report of:     UTERUS  The uterus is anteverted in position, measuring 9 1 x 4 7 x 7 9 cm  There are multiple uterine fibroids  The apparent right ovarian lesion described on prior CT is actually a right fundal subserosal fibroid measuring 5 9 x 2 5 x 3 1 cm   The cervix appears within normal limits      ENDOMETRIUM:    The endometrial echo complex has an AP caliber of 3 0 mm  Its appearance is within normal limits for age and cycle and shows no filling defects        OVARIES/ADNEXA:  Right ovary:  1 9 x 1 5 x 0 9 cm  1 3 mL  (Best seen on image 47 )  Left ovary:  2 8 x 2 4 x 1 1 cm  3 8 mL  Ovarian Doppler flow is within normal limits  No suspicious ovarian or adnexal abnormality  Patient denies pelvic pain or postmenopausal bleeding in relation to fibroid uterus  Patient denies family history of cancer, bloating or pelvic pain       Personal health questionnaire reviewed: yes  Gynecologic History  No LMP recorded  Patient is postmenopausal   Contraception: none, Postmenopausal  Last Pap:  co-testing negative   Next in   Last mammogram:  Results were: normal    Obstetric History  OB History    Para Term  AB Living   2 1 1   1 1   SAB IAB Ectopic Multiple Live Births                  # Outcome Date GA Lbr Alek/ Weight Sex Delivery Anes PTL Lv   2 AB            1 Term      CS-LTranv            The following portions of the patient's history were reviewed and updated as appropriate: allergies, current medications and problem list     Review of Systems  Pertinent items are noted in HPI  Objective     /68   Pulse 78   Ht 5' 1" (1 549 m)   Wt 64 kg (141 lb)   BMI 26 64 kg/m²     General Appearance:    Alert, cooperative, no distress, appears stated age   Head:    Normocephalic, without obvious abnormality, atraumatic   Eyes:    PERRL, conjunctiva/corneas clear, EOM's intact, fundi     benign, both eyes   Ears:    Normal TM's and external ear canals, both ears   Nose:   Nares normal, septum midline, mucosa normal, no drainage    or sinus tenderness   Throat:   Lips, mucosa, and tongue normal; teeth and gums normal   Neck:   Supple, symmetrical, trachea midline, no adenopathy;     thyroid:  no enlargement/tenderness/nodules; no carotid    bruit or JVD   Back:     Symmetric, no curvature, ROM normal, no CVA tenderness   Lungs:     Clear to auscultation bilaterally, respirations unlabored   Chest Wall:    No tenderness or deformity    Heart:    Regular rate and rhythm, S1 and S2 normal, no murmur, rub   or gallop   Breast Exam:    No tenderness, masses, or nipple abnormality   Abdomen:     Soft, non-tender, bowel sounds active all four quadrants,     no masses, no organomegaly           Extremities:   Extremities normal, atraumatic, no cyanosis or edema   Pulses:   2+ and symmetric all extremities   Skin:   Skin color, texture, turgor normal, no rashes or lesions   Lymph nodes:   Cervical, supraclavicular, and axillary nodes normal   Neurologic:   CNII-XII intact, normal strength, sensation and reflexes     throughout         Assessment/Plan    Fibroid uterus   · Patient presented last 1/30/23 after incidental finding of ovarian calcifications   Patient underwent a lap bao due to symptomatic cholelithiasis , during her presurgical evaluation ct scan was performed with finding of : "Myomatous uterus  Speckled calcifications are seen in the right ovary concerning for the possibility of a germ cell tumor"  · Pelvic US 2/6/23: The apparent right ovarian lesion described on prior CT is actually a right fundal subserosal fibroid measuring 5 9 x 2 5 x 3 1 cm   · Patient denies pelvic pain or postmenopausal bleeding in relation to fibroid uterus  Patient denies family history of cancer, bloating or pelvic pain     · Due to asymptomatic fibroid uterus, observation has been discussed with patient  · Patient to return back in October for annual examination   · All questions answered to her satisfaction      D/w Dr Bella Lubin MD

## 2023-02-28 ENCOUNTER — OFFICE VISIT (OUTPATIENT)
Dept: MULTI SPECIALTY CLINIC | Facility: CLINIC | Age: 58
End: 2023-02-28

## 2023-02-28 VITALS
DIASTOLIC BLOOD PRESSURE: 71 MMHG | BODY MASS INDEX: 26.34 KG/M2 | WEIGHT: 139.4 LBS | SYSTOLIC BLOOD PRESSURE: 110 MMHG | HEART RATE: 73 BPM | TEMPERATURE: 98.6 F

## 2023-02-28 DIAGNOSIS — M06.042 RHEUMATOID ARTHRITIS INVOLVING BOTH HANDS WITH NEGATIVE RHEUMATOID FACTOR (HCC): Primary | ICD-10-CM

## 2023-02-28 DIAGNOSIS — M06.041 RHEUMATOID ARTHRITIS INVOLVING BOTH HANDS WITH NEGATIVE RHEUMATOID FACTOR (HCC): Primary | ICD-10-CM

## 2023-02-28 RX ORDER — HYDROXYCHLOROQUINE SULFATE 200 MG/1
200 TABLET, FILM COATED ORAL
Qty: 16 TABLET | Refills: 3 | Status: SHIPPED | OUTPATIENT
Start: 2023-02-28 | End: 2023-06-12

## 2023-02-28 RX ORDER — HYDROXYCHLOROQUINE SULFATE 200 MG/1
200 TABLET, FILM COATED ORAL
Qty: 90 TABLET | Refills: 3 | Status: SHIPPED | OUTPATIENT
Start: 2023-02-28 | End: 2024-02-23

## 2023-02-28 NOTE — PROGRESS NOTES
[facility] Visit Note  Rosalie Burnett 61 y o  female   MRN: [id number]    Assessment and Plan      Diagnoses and all orders for this visit:    Rheumatoid arthritis involving both hands with negative rheumatoid factor ([facility] [address] )  -     Ambulatory Referral to Ophthalmology; Future  -     hydroxychloroquine (PLAQUENIL) 200 mg tablet; Take 1 tablet (200 mg total) by mouth daily with breakfast  -     hydroxychloroquine (PLAQUENIL) 200 mg tablet; Take 1 tablet (200 mg total) by mouth 4 (four) times a week for 60 doses Please take 1 tablet (200mg) by mouth on Sunday, Tuesday, Thursday, and Saturday in the afternoon with food  · Given continued L wrist synovial thickening, will increase plaquenil to 200mg qd, with an additional 200mg dose Sunday, Tuesday, Thursday, Saturday to approximate patient's weight based goal of 300mg qd  · Ophthalmology referral to assess for retinal complications of plaqueinil         Schedule a follow-up appointment in 6 months  Chief Complaint: I feel great  Subjective     History of Present Illness:  Rosalie Burnett is a 61 y o  female with a past medical history of clinical rheumatoid arthritis RF negative CCP >330 and hypothyroidism who presents to the clinic today for follow up of her rheumatoid arthritis  Last seen by this office 11/01/22 and started on plaquenil 200mg qd  Patient states she has experienced effectively 100% improvement in her symptoms on the plaquenil  No further left shoulder or left knee pain  There were XRs of her left shoulder and left knee that were ordered, however patient did not obtain these due to self pay  Review of Systems   Constitutional: Negative for chills and fever  HENT: Negative for ear pain and sore throat  Eyes: Negative for pain and visual disturbance  Respiratory: Negative for cough and shortness of breath      Cardiovascular: Negative for chest pain and palpitations  Gastrointestinal: Negative for abdominal pain and vomiting  Genitourinary: Negative for dysuria and hematuria  Musculoskeletal: Negative for arthralgias, back pain, gait problem, joint swelling, myalgias, neck pain and neck stiffness  Skin: Negative for color change and rash  Neurological: Negative for seizures and syncope  All other systems reviewed and are negative  Current Outpatient Medications:   •  hydroxychloroquine (PLAQUENIL) 200 mg tablet, Take 1 tablet (200 mg total) by mouth daily with breakfast, Disp: 90 tablet, Rfl: 3  •  hydroxychloroquine (PLAQUENIL) 200 mg tablet, Take 1 tablet (200 mg total) by mouth 4 (four) times a week for 60 doses Please take 1 tablet (200mg) by mouth on , Tuesday, Thursday, and Saturday in the afternoon with food  , Disp: 16 tablet, Rfl: 3  •  levothyroxine (Euthyrox) 25 mcg tablet, Take 1 5 tablets (37 5 mcg total) by mouth daily in the early morning, Disp: 30 tablet, Rfl: 5  •  omeprazole (PriLOSEC) 40 MG capsule, Take 1 capsule (40 mg total) by mouth daily (Patient not taking: Reported on 2023), Disp: 14 capsule, Rfl: 0  Past Medical History:   Diagnosis Date   • Hypothyroidism    • Periodontal disease 2022     Past Surgical History:   Procedure Laterality Date   •  SECTION     • CHOLECYSTECTOMY LAPAROSCOPIC N/A 2023    Procedure: CHOLECYSTECTOMY LAPAROSCOPIC;  Surgeon: Juancarlos Juarez DO;  Location: [facility];  Service: General   • TUBAL LIGATION       Social History     Socioeconomic History   • Marital status: Single     Spouse name: Not on file   • Number of children: Not on file   • Years of education: Not on file   • Highest education level: Not on file   Occupational History   • Not on file   Tobacco Use   • Smoking status: Never   • Smokeless tobacco: Never   Vaping Use   • Vaping Use: Never used   Substance and Sexual Activity   • Alcohol use: Not Currently     Comment: social   • Drug use: Never   • Sexual activity: Not Currently     Partners: Male     Birth control/protection: Post-menopausal   Other Topics Concern   • Not on file   Social History Narrative   • Not on file     Social Determinants of Health     Financial Resource Strain: Medium Risk   • Difficulty of Paying Living Expenses: Somewhat hard   Food Insecurity: No Food Insecurity   • Worried About Running Out of Food in the Last Year: Never true   • Ran Out of Food in the Last Year: Never true   Transportation Needs: No Transportation Needs   • Lack of Transportation (Medical): No   • Lack of Transportation (Non-Medical): No   Physical Activity: Not on file   Stress: Not on file   Social Connections: Not on file   Intimate Partner Violence: Not on file   Housing Stability: Low Risk    • Unable to Pay for Housing in the Last Year: No   • Number of Places Lived in the Last Year: 1   • Unstable Housing in the Last Year: No     Family History   Problem Relation Age of Onset   • Hypertension Mother    • Diabetes Father      No Known Allergies    Objective     Vitals:    02/28/23 1601   BP: 110/71   BP Location: Right arm   Patient Position: Sitting   Cuff Size: Standard   Pulse: 73   Temp: 98 6 °F (37 °C)   TempSrc: Temporal   Weight: 63 2 kg (139 lb 6 4 oz)       Physical exam:     GENERAL: NAD  HEENT:  NC/AT, PERRL, EOMI, no scleral icterus  CARDIAC:  RRR, +S1/S2, no S3/S4 heard, no m/g/r  PULMONARY:  CTA B/L, no wheezing/rales/rhonci, non-labored breathing  ABDOMEN:  Soft, NT/ND,no rebound/guarding/rigidity  Extremities:  + L wrist synovial thickening  NEUROLOGIC: Grossly intact  SKIN:  No rashes or erythema noted on exposed skin  Psych: Normal affect      Rodrigo Boyle MD   PGY-2 Internal Medicine  [place]    ==  PLEASE NOTE:  This encounter was completed utilizing the [employer] Direct Speech Voice Recognition Software   Grammatical errors, random word insertions, pronoun errors and incomplete sentences are occasional consequences of the system due to software limitations, ambient noise and hardware issues  These may be missed by proof reading prior to affixing electronic signature  Any questions or concerns about the content, text or information contained within the body of this dictation should be directly addressed to the physician for clarification  Please do not hesitate to call me directly if you have any any questions or concerns  Size Of Lesion In Cm: 0.5

## 2023-06-13 ENCOUNTER — OFFICE VISIT (OUTPATIENT)
Dept: DENTISTRY | Facility: CLINIC | Age: 58
End: 2023-06-13

## 2023-06-13 VITALS — DIASTOLIC BLOOD PRESSURE: 69 MMHG | HEART RATE: 68 BPM | SYSTOLIC BLOOD PRESSURE: 113 MMHG

## 2023-06-13 DIAGNOSIS — Z01.20 ENCOUNTER FOR DENTAL EXAMINATION AND CLEANING WITHOUT ABNORMAL FINDINGS: Primary | ICD-10-CM

## 2023-06-13 PROCEDURE — D0230 INTRAORAL - PERIAPICAL EACH ADDITIONAL RADIOGRAPHIC IMAGE: HCPCS

## 2023-06-13 PROCEDURE — D4910 PERIODONTAL MAINTENANCE: HCPCS

## 2023-06-13 PROCEDURE — D0120 PERIODIC ORAL EVALUATION - ESTABLISHED PATIENT: HCPCS

## 2023-06-13 PROCEDURE — D0220 INTRAORAL - PERIAPICAL FIRST RADIOGRAPHIC IMAGE: HCPCS

## 2023-06-13 NOTE — DENTAL PROCEDURE DETAILS
Wendy Corral presents for a Periodic exam  Verbal consent for treatment given in addition to the forms  Reviewed health history - Patient is ASA II  Consents signed: Yes     Perio: 4A  Pain Scale: 0  Caries Assessment: Low  Radiographs: PA #21-22 and 27-28     Maintain 6mrc Perio Mt appointments

## 2023-06-13 NOTE — DENTAL PROCEDURE DETAILS
Periodontal maintenance, PeriodicX, 2 PAs  Reviewed meds/hxx in Epic  ASA II    CC: 0    Perio charting completed  Perio findings: probes wnl  4A    Cavitron and handscale, polished and flossed  Patient wears FUD and LPD, happy w/ fit  Dr Beltran Gaffney Ex  OH: Good  OHI: reviewed importance of brushing 2x/day, flossing subgingivally daily and using a daily mouthrinse  Discussed importance of regular 3-4 month periodontal maintenance appts  Explained to the patient without good home care and irregular dental visits, this disease can return  Patient understands  NV: 6 month periodontal mt appt  , periodicX

## 2023-06-13 NOTE — DENTAL PROCEDURE DETAILS
Dara Vickers presents for a Periodic exam w/ Dr Manuel Lopes  Verbal consent for treatment given in addition to the forms  Reviewed health history - Patient is ASA II  Consents signed: Yes     Perio: 4A  Pain Scale: 0  Caries Assessment: Low  Radiographs: PA 21,22 and 27,28  Oral Hygiene instruction reviewed and given  Recommended 6 month Perio Maintenance Hygiene recall visits with the Zana Price

## 2023-06-21 ENCOUNTER — OFFICE VISIT (OUTPATIENT)
Dept: INTERNAL MEDICINE CLINIC | Facility: CLINIC | Age: 58
End: 2023-06-21

## 2023-06-21 VITALS
DIASTOLIC BLOOD PRESSURE: 72 MMHG | HEART RATE: 82 BPM | BODY MASS INDEX: 26.43 KG/M2 | HEIGHT: 61 IN | SYSTOLIC BLOOD PRESSURE: 108 MMHG | WEIGHT: 140 LBS | TEMPERATURE: 98.1 F

## 2023-06-21 DIAGNOSIS — D25.1 FIBROIDS, INTRAMURAL: ICD-10-CM

## 2023-06-21 DIAGNOSIS — M06.09 RHEUMATOID ARTHRITIS OF MULTIPLE SITES WITH NEGATIVE RHEUMATOID FACTOR (HCC): ICD-10-CM

## 2023-06-21 DIAGNOSIS — E03.9 HYPOTHYROIDISM, UNSPECIFIED TYPE: Primary | ICD-10-CM

## 2023-06-21 DIAGNOSIS — E78.5 HYPERLIPIDEMIA, UNSPECIFIED HYPERLIPIDEMIA TYPE: ICD-10-CM

## 2023-06-21 DIAGNOSIS — Z23 NEED FOR PNEUMOCOCCAL 20-VALENT CONJUGATE VACCINATION: ICD-10-CM

## 2023-06-21 PROCEDURE — 90677 PCV20 VACCINE IM: CPT

## 2023-06-21 PROCEDURE — 99215 OFFICE O/P EST HI 40 MIN: CPT | Performed by: INTERNAL MEDICINE

## 2023-06-21 PROCEDURE — 90471 IMMUNIZATION ADMIN: CPT

## 2023-06-21 NOTE — PROGRESS NOTES
[facility]  INTERNAL MEDICINE OFFICE VISIT     PATIENT INFORMATION     Reyes Inch   61 y o  female   MRN: [id number]    ASSESSMENT/PLAN     Diagnoses and all orders for this visit:    Hypothyroidism, unspecified type  -     TSH, 3rd generation with Free T4 reflex; Future  -     Instructed to continue levothyroxine 25mcg at this time; will adjust dose further based on repeat TSH  Patient is largely asymptomatic aside from some mild hair thinning/loss  Hyperlipidemia, unspecified hyperlipidemia type  -     Lipid Panel with Direct LDL reflex; Future  -     Statin previously discontinued to rule this out as a cause for her myalgias/arthralgias  Will recheck lipid panel and restart statin if indicated  Rheumatoid arthritis of multiple sites with negative rheumatoid factor (HCC)        -     Disease appears to be controlled at this time        -     Continue Plaquenil and rheumatology follow up    Need for pneumococcal 20-valent conjugate vaccination  -     Pneumococcal Conjugate Vaccine 20-valent (Pcv20)    Fibroids, intramural        -     Continue follow up with OB/GYN as scheduled        -     Appears largely asymptomatic related to her fibroids      Schedule a follow-up appointment in 6 weeks for follow up weight, thyroid      HEALTH MAINTENANCE     Immunization History   Administered Date(s) Administered   • COVID-19 PFIZER VACCINE 0 3 ML IM 04/10/2021, 05/01/2021, 12/04/2021, 07/13/2022   • Influenza, recombinant, quadrivalent,injectable, preservative free 10/26/2021   • Influenza, seasonal, injectable 10/15/2012, 11/18/2013, 10/06/2014, 10/18/2016   • Pneumococcal Conjugate Vaccine 20-valent (Pcv20), Polysace 06/21/2023   • Tdap 10/15/2012, 11/11/2022     CHIEF COMPLAINT     Chief Complaint   Patient presents with   • Follow-up      HISTORY OF PRESENT ILLNESS     62yo F with history of hypothyroidism, HLD, RA on Plaquenil presents for follow up of "chronic conditions  Patient declined interpretor for today's visit  She still does have some knee and hand pain in the morning on average 2 times per week which responds to ibuprofen if needed  No associated swelling  She is still taking her hydroxychloroqine 200mg daily (not taking the extra doses that appear to be prescribed 4 times per week)  She plans to continue Rheumatology follow up  Patient does report weight loss after recent surgery a few months ago - about 20 pounds  Her appetite is normal  She does not have significant night sweats, she only noted that her shirt may feel mildly damp in the morning on average once a week  She is still feeling well after her cholecystectomy  She has some residual epigastric discomfort after eating, especially worse after fatty foods  We did discuss her recent visits with OB/GYN  She is relieved that the calcification initially noted represents a subserosal fibroid and is not likely suggestive of malignancy  She plans to follow up with OB as scheduled  She denies lower abdominal or pelvic pain today  She is still compliant with levothyroxine and takes it 30 minutes before breakfast  She is taking 1 25mcg pill (although it is prescribed as 37 5mcg)  She does not feel fatigue, constipation, diarrhea, tremors, heat or cold intolerance  She does notice some hair loss  REVIEW OF SYSTEMS     Review of Systems - 12 point ROS performed and negative unless stated above  OBJECTIVE     Vitals:    06/21/23 0754   BP: 108/72   BP Location: Right arm   Patient Position: Sitting   Cuff Size: Adult   Pulse: 82   Temp: 98 1 °F (36 7 °C)   TempSrc: Temporal   Weight: 63 5 kg (140 lb)   Height: 5' 1\" (1 549 m)     Physical Exam  Vitals and nursing note reviewed  Constitutional:       General: She is not in acute distress  Appearance: Normal appearance  She is well-developed  HENT:      Head: Normocephalic and atraumatic     Eyes:      Conjunctiva/sclera: " Conjunctivae normal    Cardiovascular:      Rate and Rhythm: Normal rate and regular rhythm  Heart sounds: S1 normal and S2 normal  No murmur heard  Pulmonary:      Effort: Pulmonary effort is normal  No respiratory distress  Breath sounds: Normal breath sounds  Abdominal:      General: Bowel sounds are normal  There is no distension  Palpations: Abdomen is soft  Tenderness: There is no abdominal tenderness  Musculoskeletal:      Cervical back: Neck supple  Right lower leg: No edema  Left lower leg: No edema  Skin:     General: Skin is warm and dry  Neurological:      Mental Status: She is alert and oriented to person, place, and time  Psychiatric:         Mood and Affect: Mood normal          Thought Content: Thought content normal        CURRENT MEDICATIONS     Current Outpatient Medications:   •  hydroxychloroquine (PLAQUENIL) 200 mg tablet, Take 1 tablet (200 mg total) by mouth daily with breakfast, Disp: 90 tablet, Rfl: 3  •  levothyroxine (Euthyrox) 25 mcg tablet, Take 1 5 tablets (37 5 mcg total) by mouth daily in the early morning, Disp: 30 tablet, Rfl: 5  •  hydroxychloroquine (PLAQUENIL) 200 mg tablet, Take 1 tablet (200 mg total) by mouth 4 (four) times a week for 60 doses Please take 1 tablet (200mg) by mouth on , Tuesday, Thursday, and Saturday in the afternoon with food  , Disp: 16 tablet, Rfl: 3  •  omeprazole (PriLOSEC) 40 MG capsule, Take 1 capsule (40 mg total) by mouth daily (Patient not taking: Reported on 2023), Disp: 14 capsule, Rfl: 0    PAST MEDICAL & SURGICAL HISTORY     Past Medical History:   Diagnosis Date   • Hypothyroidism    • Periodontal disease 2022     Past Surgical History:   Procedure Laterality Date   •  SECTION     • CHOLECYSTECTOMY LAPAROSCOPIC N/A 2023    Procedure: CHOLECYSTECTOMY LAPAROSCOPIC;  Surgeon: Nya Boucher DO;  Location: [facility];  Service: General   • [address] HISTORY     Social History     Socioeconomic History   • Marital status: Single     Spouse name: Not on file   • Number of children: Not on file   • Years of education: Not on file   • Highest education level: Not on file   Occupational History   • Not on file   Tobacco Use   • Smoking status: Never   • Smokeless tobacco: Never   Vaping Use   • Vaping Use: Never used   Substance and Sexual Activity   • Alcohol use: Not Currently     Comment: social   • Drug use: Never   • Sexual activity: Not Currently     Partners: Male     Birth control/protection: Post-menopausal   Other Topics Concern   • Not on file   Social History Narrative   • Not on file     Social Determinants of Health     Financial Resource Strain: Low Risk  (6/21/2023)    Overall Financial Resource Strain (CARDIA)    • Difficulty of Paying Living Expenses: Not hard at all   Food Insecurity: No Food Insecurity (6/21/2023)    Hunger Vital Sign    • Worried About Running Out of Food in the Last Year: Never true    • Ran Out of Food in the Last Year: Never true   Transportation Needs: No Transportation Needs (6/21/2023)    PRAPARE - Transportation    • Lack of Transportation (Medical): No    • Lack of Transportation (Non-Medical):  No   Physical Activity: Sufficiently Active (10/26/2021)    Exercise Vital Sign    • Days of Exercise per Week: 7 days    • Minutes of Exercise per Session: 30 min   Stress: No Stress Concern Present (10/26/2021)    [address]    • Feeling of Stress : Not at all   Social Connections: Socially Isolated (10/26/2021)    Social Connection and Isolation Panel [NHANES]    • Frequency of Communication with Friends and Family: Twice a week    • Frequency of Social Gatherings with Friends and Family: Never    • Attends Bahai Services: Never    • Active Member of Clubs or Organizations: No    • Attends Club or Organization Meetings: Never    • Marital Status: Never    Intimate Partner Violence: Not At Risk (10/26/2021)    Humiliation, Afraid, Rape, and Kick questionnaire    • Fear of Current or Ex-Partner: No    • Emotionally Abused: No    • Physically Abused: No    • Sexually Abused: No   Housing Stability: Low Risk  (1/15/2023)    Housing Stability Vital Sign    • Unable to Pay for Housing in the Last Year: No    • Number of Places Lived in the Last Year: 1    • Unstable Housing in the Last Year: No     Social History     Substance and Sexual Activity   Alcohol Use Not Currently    Comment: social     Substance and Sexual Activity   Alcohol Use Not Currently    Comment: social        Substance and Sexual Activity   Drug Use Never     Social History     Tobacco Use   Smoking Status Never   Smokeless Tobacco Never     Family History   Problem Relation Age of Onset   • Hypertension Mother    • Diabetes Father                ==  Carmen Carty DO  PGY-2  [facility] [address] [place] [address] - [place] , [address] [address] [facility], [facility]  Office: [phone]  Fax: [phone]

## 2023-06-21 NOTE — PATIENT INSTRUCTIONS
Please get blood work when you are able to  Do not eat for 8 hours before you get the blood work done  You can drink water  We will call you with the results  Follow up with OB/GYN, Rheumatology (joint doctor) as scheduled  Please see the eye doctor when you are able to

## 2023-07-25 ENCOUNTER — APPOINTMENT (OUTPATIENT)
Dept: LAB | Facility: CLINIC | Age: 58
End: 2023-07-25

## 2023-07-25 DIAGNOSIS — E03.9 HYPOTHYROIDISM, UNSPECIFIED TYPE: ICD-10-CM

## 2023-07-25 DIAGNOSIS — E78.5 HYPERLIPIDEMIA, UNSPECIFIED HYPERLIPIDEMIA TYPE: ICD-10-CM

## 2023-07-25 LAB
CHOLEST SERPL-MCNC: 193 MG/DL
HDLC SERPL-MCNC: 67 MG/DL
LDLC SERPL CALC-MCNC: 112 MG/DL (ref 0–100)
TRIGL SERPL-MCNC: 72 MG/DL
TSH SERPL DL<=0.05 MIU/L-ACNC: 3.23 UIU/ML (ref 0.45–4.5)

## 2023-07-25 PROCEDURE — 84443 ASSAY THYROID STIM HORMONE: CPT

## 2023-07-25 PROCEDURE — 36415 COLL VENOUS BLD VENIPUNCTURE: CPT

## 2023-07-25 PROCEDURE — 80061 LIPID PANEL: CPT

## 2023-08-25 ENCOUNTER — OFFICE VISIT (OUTPATIENT)
Dept: INTERNAL MEDICINE CLINIC | Facility: CLINIC | Age: 58
End: 2023-08-25

## 2023-08-25 VITALS
BODY MASS INDEX: 25.86 KG/M2 | OXYGEN SATURATION: 99 % | HEIGHT: 61 IN | HEART RATE: 67 BPM | TEMPERATURE: 98.2 F | SYSTOLIC BLOOD PRESSURE: 112 MMHG | DIASTOLIC BLOOD PRESSURE: 73 MMHG | WEIGHT: 137 LBS

## 2023-08-25 DIAGNOSIS — E78.5 HYPERLIPIDEMIA, UNSPECIFIED HYPERLIPIDEMIA TYPE: ICD-10-CM

## 2023-08-25 DIAGNOSIS — I83.812 VARICOSE VEINS OF LEFT LOWER EXTREMITY WITH PAIN: ICD-10-CM

## 2023-08-25 DIAGNOSIS — E03.9 HYPOTHYROIDISM, UNSPECIFIED TYPE: Primary | ICD-10-CM

## 2023-08-25 PROCEDURE — 99214 OFFICE O/P EST MOD 30 MIN: CPT | Performed by: INTERNAL MEDICINE

## 2023-08-25 NOTE — PROGRESS NOTES
[facility]  INTERNAL MEDICINE OFFICE VISIT     PATIENT INFORMATION     Jerman August   61 y.o. female   MRN: [id number]    ASSESSMENT/PLAN     Diagnoses and all orders for this visit:    Hypothyroidism, unspecified type  Recent TSH 3.7 and patient is asymptomatic. Confirmed she is taking her levothyroxine 45 minutes before eating in the morning. Will continue levothyroxine 25mcg daily. Hyperlipidemia, unspecified hyperlipidemia type  Most recent  and  off statin therapy. Given low ASCVD risk (1.7%) will opt for dietary modifications at this time and follow up at a later date. Varicose veins of left lower extremity with pain  -     Compression Stocking  Patient complaining of moderately painful varicose veins of the left leg as proximal as the popliteal fossa. None on the right leg. Will trial compression stockings and refer to vascular surgery if they do not improve by her next visit. Schedule a follow-up appointment in 3 months for annual physical.    HEALTH MAINTENANCE     Immunization History   Administered Date(s) Administered   • COVID-19 PFIZER VACCINE 0.3 ML IM 04/10/2021, 05/01/2021, 12/04/2021, 07/13/2022   • Influenza, recombinant, quadrivalent,injectable, preservative free 10/26/2021   • Influenza, seasonal, injectable 10/15/2012, 11/18/2013, 10/06/2014, 10/18/2016   • Pneumococcal Conjugate Vaccine 20-valent (Pcv20), Polysace 06/21/2023   • Tdap 10/15/2012, 11/11/2022     CHIEF COMPLAINT     Chief Complaint   Patient presents with   • Follow-up      HISTORY OF PRESENT ILLNESS     Patient is a 62yo F with history of hypothyroidism, HLD, RA on Plaquenil who presents for routine follow up. Patient has no complaints today and is feeling well. She is still taking levothyroxine 25mcg about 45 minutes before eating every morning.  Denies any significant changes in weight, constipation, diarrhea, heat or cold intolerance, difficulty swallowing, enlargement of neck, palpitations. She is still having some hair thinning. At last visit, it was noted that patient was previously on statin therapy, but this was stopped due to concern for statin-associated myalgias. She has since been diagnosed with RA that was likely contributing to her symptoms. Repeat lipid panel recently showed total cholesterol 193 with  (decreased from prior). BP well controlled today in office 112/73 without medications. REVIEW OF SYSTEMS     Review of Systems - 12 point ROS performed and negative unless stated above. OBJECTIVE     Vitals:    08/25/23 0756   BP: 112/73   BP Location: Left arm   Patient Position: Sitting   Cuff Size: Standard   Pulse: 67   Temp: 98.2 °F (36.8 °C)   TempSrc: Temporal   SpO2: 99%   Weight: 62.1 kg (137 lb)   Height: 5' 1" (1.549 m)     Physical Exam  Vitals reviewed. Constitutional:       General: She is not in acute distress. Appearance: Normal appearance. She is well-developed. HENT:      Head: Normocephalic and atraumatic. Eyes:      Conjunctiva/sclera: Conjunctivae normal.   Neck:      Thyroid: No thyromegaly or thyroid tenderness. Cardiovascular:      Rate and Rhythm: Normal rate and regular rhythm. Heart sounds: S1 normal and S2 normal. No murmur heard. Pulmonary:      Effort: Pulmonary effort is normal. No respiratory distress. Breath sounds: Normal breath sounds. Abdominal:      General: Bowel sounds are normal. There is no distension. Palpations: Abdomen is soft. Tenderness: There is no abdominal tenderness. Musculoskeletal:      Cervical back: Neck supple. Right lower leg: No edema. Left lower leg: No edema. Skin:     General: Skin is warm and dry. Neurological:      General: No focal deficit present. Mental Status: She is alert and oriented to person, place, and time. Psychiatric:         Mood and Affect: Mood normal.         Thought Content:  Thought content normal.       CURRENT MEDICATIONS     Current Outpatient Medications:   •  hydroxychloroquine (PLAQUENIL) 200 mg tablet, Take 1 tablet (200 mg total) by mouth daily with breakfast, Disp: 90 tablet, Rfl: 3  •  hydroxychloroquine (PLAQUENIL) 200 mg tablet, Take 1 tablet (200 mg total) by mouth 4 (four) times a week for 60 doses Please take 1 tablet (200mg) by mouth on , Tuesday, Thursday, and Saturday in the afternoon with food. , Disp: 16 tablet, Rfl: 3  •  levothyroxine (Euthyrox) 25 mcg tablet, Take 1.5 tablets (37.5 mcg total) by mouth daily in the early morning, Disp: 30 tablet, Rfl: 5    PAST MEDICAL & SURGICAL HISTORY     Past Medical History:   Diagnosis Date   • Hypothyroidism    • Periodontal disease 2022     Past Surgical History:   Procedure Laterality Date   •  SECTION     • CHOLECYSTECTOMY LAPAROSCOPIC N/A 2023    Procedure: CHOLECYSTECTOMY LAPAROSCOPIC;  Surgeon: Jayne Oh DO;  Location: [facility];  Service: General   • TUBAL LIGATION       SOCIAL & FAMILY HISTORY     Social History     Socioeconomic History   • Marital status: Single     Spouse name: Not on file   • Number of children: Not on file   • Years of education: Not on file   • Highest education level: Not on file   Occupational History   • Not on file   Tobacco Use   • Smoking status: Never   • Smokeless tobacco: Never   Vaping Use   • Vaping Use: Never used   Substance and Sexual Activity   • Alcohol use: Not Currently     Comment: social   • Drug use: Never   • Sexual activity: Not Currently     Partners: Male     Birth control/protection: Post-menopausal   Other Topics Concern   • Not on file   Social History Narrative   • Not on file     Social Determinants of Health     Financial Resource Strain: Low Risk  (2023)    Overall Financial Resource Strain (CARDIA)    • Difficulty of Paying Living Expenses: Not hard at all   Food Insecurity: No Food Insecurity (2023)    Hunger Vital Sign    • Worried About Running Out of Food in the Last Year: Never true    • Ran Out of Food in the Last Year: Never true   Transportation Needs: No Transportation Needs (6/21/2023)    PRAPARE - Transportation    • Lack of Transportation (Medical): No    • Lack of Transportation (Non-Medical):  No   Physical Activity: Sufficiently Active (10/26/2021)    Exercise Vital Sign    • Days of Exercise per Week: 7 days    • Minutes of Exercise per Session: 30 min   Stress: No Stress Concern Present (10/26/2021)    [address] [place] [address]    • Feeling of Stress : Not at all   Social Connections: Socially Isolated (10/26/2021)    Social Connection and Isolation Panel [NHANES]    • Frequency of Communication with Friends and Family: Twice a week    • Frequency of Social Gatherings with Friends and Family: Never    • Attends Holiness Services: Never    • Active Member of Clubs or Organizations: No    • Attends Club or Organization Meetings: Never    • Marital Status: Never    Intimate Partner Violence: Not At Risk (10/26/2021)    Humiliation, Afraid, Rape, and Kick questionnaire    • Fear of Current or Ex-Partner: No    • Emotionally Abused: No    • Physically Abused: No    • Sexually Abused: No   Housing Stability: Low Risk  (1/15/2023)    Housing Stability Vital Sign    • Unable to Pay for Housing in the Last Year: No    • Number of Places Lived in the Last Year: 1    • Unstable Housing in the Last Year: No     Social History     Substance and Sexual Activity   Alcohol Use Not Currently    Comment: social       Social History     Substance and Sexual Activity   Drug Use Never     Social History     Tobacco Use   Smoking Status Never   Smokeless Tobacco Never     Family History   Problem Relation Age of Onset   • Hypertension Mother    • Diabetes Father                ==  Eleanor Walden DO  PGY-3  [facility] [address], [address] [place] [address] Road  Office: [phone]  Fax: [phone]

## 2023-08-25 NOTE — PATIENT INSTRUCTIONS
Please continue to take all medications as prescribed to you. Please call [phone] to schedule your mammogram.    Please make sure to make the changes to your diet that we talked about to lower your cholesterol.

## 2023-09-11 DIAGNOSIS — E03.9 HYPOTHYROIDISM, UNSPECIFIED TYPE: ICD-10-CM

## 2023-09-12 RX ORDER — LEVOTHYROXINE SODIUM 0.03 MG/1
TABLET ORAL
Qty: 30 TABLET | Refills: 5 | Status: SHIPPED | OUTPATIENT
Start: 2023-09-12

## 2023-09-12 NOTE — TELEPHONE ENCOUNTER
It appears patient has been taking 37.5 mcg daily, even at last office visit even though it was stated otherwise in the note. I presume the doctors interpreted the dose as 25 mcg based on the way the prescription is written. Will refill current dose of 37.5 mcg daily as this is what she has been taking per chart review.

## 2023-09-12 NOTE — TELEPHONE ENCOUNTER
Can you confirm if this is correct dose? Last office note stated to continue one tab daily but this refill is one and a half daily.  You are covering Dr. Vasiliy Cassidy

## 2023-12-08 ENCOUNTER — OFFICE VISIT (OUTPATIENT)
Dept: INTERNAL MEDICINE CLINIC | Facility: CLINIC | Age: 58
End: 2023-12-08

## 2023-12-08 ENCOUNTER — APPOINTMENT (OUTPATIENT)
Dept: LAB | Facility: CLINIC | Age: 58
End: 2023-12-08

## 2023-12-08 VITALS
HEART RATE: 71 BPM | DIASTOLIC BLOOD PRESSURE: 76 MMHG | WEIGHT: 139.6 LBS | BODY MASS INDEX: 26.38 KG/M2 | SYSTOLIC BLOOD PRESSURE: 117 MMHG

## 2023-12-08 DIAGNOSIS — Z12.31 ENCOUNTER FOR SCREENING MAMMOGRAM FOR MALIGNANT NEOPLASM OF BREAST: ICD-10-CM

## 2023-12-08 DIAGNOSIS — M06.09 RHEUMATOID ARTHRITIS OF MULTIPLE SITES WITH NEGATIVE RHEUMATOID FACTOR (HCC): ICD-10-CM

## 2023-12-08 DIAGNOSIS — Z23 ENCOUNTER FOR IMMUNIZATION: ICD-10-CM

## 2023-12-08 DIAGNOSIS — E03.9 HYPOTHYROIDISM, UNSPECIFIED TYPE: ICD-10-CM

## 2023-12-08 DIAGNOSIS — Z00.00 ANNUAL PHYSICAL EXAM: Primary | ICD-10-CM

## 2023-12-08 DIAGNOSIS — Z12.11 SCREENING FOR MALIGNANT NEOPLASM OF COLON: ICD-10-CM

## 2023-12-08 LAB
ANION GAP SERPL CALCULATED.3IONS-SCNC: 7 MMOL/L
BASOPHILS # BLD AUTO: 0.07 THOUSANDS/ÂΜL (ref 0–0.1)
BASOPHILS NFR BLD AUTO: 1 % (ref 0–1)
BUN SERPL-MCNC: 10 MG/DL (ref 5–25)
CALCIUM SERPL-MCNC: 10 MG/DL (ref 8.4–10.2)
CHLORIDE SERPL-SCNC: 105 MMOL/L (ref 96–108)
CO2 SERPL-SCNC: 28 MMOL/L (ref 21–32)
CREAT SERPL-MCNC: 0.52 MG/DL (ref 0.6–1.3)
EOSINOPHIL # BLD AUTO: 0.2 THOUSAND/ÂΜL (ref 0–0.61)
EOSINOPHIL NFR BLD AUTO: 3 % (ref 0–6)
ERYTHROCYTE [DISTWIDTH] IN BLOOD BY AUTOMATED COUNT: 12.4 % (ref 11.6–15.1)
GFR SERPL CREATININE-BSD FRML MDRD: 105 ML/MIN/1.73SQ M
GLUCOSE SERPL-MCNC: 111 MG/DL (ref 65–140)
HCT VFR BLD AUTO: 38.6 % (ref 34.8–46.1)
HGB BLD-MCNC: 12.9 G/DL (ref 11.5–15.4)
IMM GRANULOCYTES # BLD AUTO: 0.01 THOUSAND/UL (ref 0–0.2)
IMM GRANULOCYTES NFR BLD AUTO: 0 % (ref 0–2)
LYMPHOCYTES # BLD AUTO: 1.62 THOUSANDS/ÂΜL (ref 0.6–4.47)
LYMPHOCYTES NFR BLD AUTO: 25 % (ref 14–44)
MCH RBC QN AUTO: 30.9 PG (ref 26.8–34.3)
MCHC RBC AUTO-ENTMCNC: 33.4 G/DL (ref 31.4–37.4)
MCV RBC AUTO: 92 FL (ref 82–98)
MONOCYTES # BLD AUTO: 0.27 THOUSAND/ÂΜL (ref 0.17–1.22)
MONOCYTES NFR BLD AUTO: 4 % (ref 4–12)
NEUTROPHILS # BLD AUTO: 4.4 THOUSANDS/ÂΜL (ref 1.85–7.62)
NEUTS SEG NFR BLD AUTO: 67 % (ref 43–75)
NRBC BLD AUTO-RTO: 0 /100 WBCS
PLATELET # BLD AUTO: 313 THOUSANDS/UL (ref 149–390)
PMV BLD AUTO: 10.2 FL (ref 8.9–12.7)
POTASSIUM SERPL-SCNC: 4.1 MMOL/L (ref 3.5–5.3)
RBC # BLD AUTO: 4.18 MILLION/UL (ref 3.81–5.12)
SODIUM SERPL-SCNC: 140 MMOL/L (ref 135–147)
WBC # BLD AUTO: 6.57 THOUSAND/UL (ref 4.31–10.16)

## 2023-12-08 PROCEDURE — 85025 COMPLETE CBC W/AUTO DIFF WBC: CPT

## 2023-12-08 PROCEDURE — 80048 BASIC METABOLIC PNL TOTAL CA: CPT

## 2023-12-08 PROCEDURE — 99213 OFFICE O/P EST LOW 20 MIN: CPT | Performed by: INTERNAL MEDICINE

## 2023-12-08 PROCEDURE — 36415 COLL VENOUS BLD VENIPUNCTURE: CPT

## 2023-12-08 PROCEDURE — 99396 PREV VISIT EST AGE 40-64: CPT | Performed by: INTERNAL MEDICINE

## 2023-12-08 PROCEDURE — 90686 IIV4 VACC NO PRSV 0.5 ML IM: CPT | Performed by: INTERNAL MEDICINE

## 2023-12-08 PROCEDURE — 90471 IMMUNIZATION ADMIN: CPT | Performed by: INTERNAL MEDICINE

## 2023-12-08 NOTE — PROGRESS NOTES
[facility] [address]     NAME: Sophie Morrison  AGE: 61 y.o. SEX: female  : 1965     DATE: 2023     Assessment and Plan:     Problem List Items Addressed This Visit       Rheumatoid arthritis of multiple sites with negative rheumatoid factor ([address])    Relevant Orders    CBC and differential    Basic metabolic panel  - Patient is due for repeat CBC and BMP. Will hold off on other labs at this time as she is self-pay. Also instructed patient to schedule follow up with Rheumatology that is due. Reminded patient to schedule ophthalmology visit when able while on Plaquenil. Other Visit Diagnoses       Annual physical exam    -  Primary      Encounter for immunization        Relevant Orders    influenza vaccine, quadrivalent, 0.5 mL, preservative-free, for adult and pediatric patients 6 mos+ (AFLURIA, FLUARIX, FLULAVAL, FLUZONE) (Completed)      Hypothyroidism, unspecified type      - Continue levothyroxine at current dose. She is taking 30-60 minutes before meals. Encounter for screening mammogram for malignant neoplasm of breast  Patient is due for mammogram. She has put this off due to cost concerns. I did provide the patient with the central scheduling phone number to contact and ask about [facility], which may be able to reduce the cost of her mammogram. She is agreeable to this. Screening for malignant neoplasm of colon        Relevant Orders    Occult Blood, Fecal Immunochemical  - Due for next FIT test for colon cancer screening. Kit provided to patient today. Immunizations and preventive care screenings were discussed with patient today. Appropriate education was printed on patient's after visit summary. Counseling:  Alcohol/drug use: discussed moderation in alcohol intake, the recommendations for healthy alcohol use, and avoidance of illicit drug use.   Dental Health: discussed importance of regular tooth brushing, flossing, and dental visits. Sexual health: discussed sexually transmitted diseases, partner selection, use of condoms, avoidance of unintended pregnancy, and contraceptive alternatives. Exercise: the importance of regular exercise/physical activity was discussed. Recommend exercise 3-5 times per week for at least 30 minutes. Return in about 6 months (around 6/8/2024), or with me, for follow up chronic conditions. Chief Complaint:     Chief Complaint   Patient presents with    Physical Exam      History of Present Illness:     Adult Annual Physical   62yo F with history of hypothyroidism, cholelithiasis s/p lap bao in 1/2023, seronegative RA on Plaquenil presents for annual physical exam. Med rec completed with patient. I confirmed she is taking her levothyroxine 30-60 minutes before eating. She also takes an OTC multivitamin, fish oil. Last labs showed:  CBC 1/2023 mild leukocytosis (in setting of surgery) and stable anemia. BMP 1/2023 K 3.3 during admission otherwise normal  TSH 7/2023 3.231  Lipid panel 7/2023  TRG 72 HDL 67  (off statin)    She denies any chest pain on exertion, dyspnea, orthopnea, PND, nausea/vomiting, constipation, diarrhea, weight changes, heat or cold intolerance. She does report having some increased pain and stiffness in the morning. She has been compliant with her Plaquenil everyday. The pain usually lasts about 15 minutes and then self resolves with movement of hands. 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 BP well controlled today. She is due for repeat FIT testing and overdue for mammogram (which she has put off due to cost concerns). She is up to date on Pap smear due in 2026. Diet and Physical Activity  Diet/Nutrition: well balanced diet. Exercise: no formal exercise.       Depression Screening  PHQ-2/9 Depression Screening           General Health  Sleep: sleeps well. Hearing: normal - bilateral.  Vision: wears glasses. Dental: regular dental visits. /GYN Health  Follows with gynecology? yes   Patient is: postmenopausal  Last menstrual period: N/A         Review of Systems:     Review of Systems - 12 point ROS performed and negative unless stated above.      Past Medical History:     Past Medical History:   Diagnosis Date    Hypothyroidism     Periodontal disease 2022      Past Surgical History:     Past Surgical History:   Procedure Laterality Date     SECTION      CHOLECYSTECTOMY LAPAROSCOPIC N/A 2023    Procedure: CHOLECYSTECTOMY LAPAROSCOPIC;  Surgeon: Audrey Diehl DO;  Location[facility];  Service: General    TUBAL LIGATION        Social History:     Social History     Socioeconomic History    Marital status: Single     Spouse name: Not on file    Number of children: Not on file    Years of education: Not on file    Highest education level: Not on file   Occupational History    Not on file   Tobacco Use    Smoking status: Never    Smokeless tobacco: Never   Vaping Use    Vaping Use: Never used   Substance and Sexual Activity    Alcohol use: Not Currently     Comment: social    Drug use: Never    Sexual activity: Not Currently     Partners: Male     Birth control/protection: Post-menopausal   Other Topics Concern    Not on file   Social History Narrative    Not on file     Social Determinants of Health     Financial Resource Strain: Low Risk  (2023)    Overall Financial Resource Strain (CARDIA)     Difficulty of Paying Living Expenses: Not hard at all   Food Insecurity: No Food Insecurity (2023)    Hunger Vital Sign     Worried About Running Out of Food in the Last Year: Never true     Ran Out of Food in the Last Year: Never true   Transportation Needs: No Transportation Needs (2023)    PRAPARE - Transportation     Lack of Transportation (Medical): No     Lack of Transportation (Non-Medical): No   Physical Activity: Sufficiently Active (10/26/2021)    Exercise Vital Sign     Days of Exercise per Week: 7 days     Minutes of Exercise per Session: 30 min   Stress: No Stress Concern Present (10/26/2021)    [facility]     Feeling of Stress : Not at all   Social Connections: Socially Isolated (10/26/2021)    Social Connection and Isolation Panel [NHANES]     Frequency of Communication with Friends and Family: Twice a week     Frequency of Social Gatherings with Friends and Family: Never     Attends Christian Services: Never     Active Member of Clubs or Organizations: No     Attends Club or Organization Meetings: Never     Marital Status: Never    Intimate Partner Violence: Not At Risk (10/26/2021)    Humiliation, Afraid, Rape, and Kick questionnaire     Fear of Current or Ex-Partner: No     Emotionally Abused: No     Physically Abused: No     Sexually Abused: No   Housing Stability: Low Risk  (1/15/2023)    Housing Stability Vital Sign     Unable to Pay for Housing in the Last Year: No     Number of Places Lived in the Last Year: 1     Unstable Housing in the Last Year: No      Family History:     Family History   Problem Relation Age of Onset    Hypertension Mother     Diabetes Father       Current Medications:     Current Outpatient Medications   Medication Sig Dispense Refill    hydroxychloroquine (PLAQUENIL) 200 mg tablet Take 1 tablet (200 mg total) by mouth daily with breakfast 90 tablet 3    levothyroxine 25 mcg tablet TAKE 1 AND 1/2 TABLET (37.5 MCG TOTAL) BY MOUTH DAILY IN THE EARLY MORNING 30 tablet 5    hydroxychloroquine (PLAQUENIL) 200 mg tablet Take 1 tablet (200 mg total) by mouth 4 (four) times a week for 60 doses Please take 1 tablet (200mg) by mouth on Sunday, Tuesday, Thursday, and Saturday in the afternoon with food.  16 tablet 3     No current facility-administered medications for this visit. Allergies:     No Known Allergies   Physical Exam:     /76 (BP Location: Left arm, Patient Position: Sitting, Cuff Size: Standard)   Pulse 71   Wt 63.3 kg (139 lb 9.6 oz)   BMI 26.38 kg/m²     Physical Exam  Vitals reviewed. Constitutional:       General: She is not in acute distress. Appearance: Normal appearance. She is well-developed. HENT:      Head: Normocephalic and atraumatic. Eyes:      Conjunctiva/sclera: Conjunctivae normal.   Cardiovascular:      Rate and Rhythm: Normal rate and regular rhythm. Heart sounds: S1 normal and S2 normal. No murmur heard. Pulmonary:      Effort: Pulmonary effort is normal. No respiratory distress. Breath sounds: Normal breath sounds. Abdominal:      General: Bowel sounds are normal. There is no distension. Palpations: Abdomen is soft. Tenderness: There is no abdominal tenderness. Musculoskeletal:      Cervical back: Neck supple. Right lower leg: No edema. Left lower leg: No edema. Skin:     General: Skin is warm and dry. Neurological:      Mental Status: She is alert and oriented to person, place, and time. Psychiatric:         Mood and Affect: Mood normal.         Thought Content:  Thought content normal.          Ara Hood DO  [facility]

## 2023-12-08 NOTE — PATIENT INSTRUCTIONS
Please make an appointment with your joint doctor (Rheumatology) when you are able to. Call [phone] to ask for [facility] to help schedule your mammogram for a lower cost.  Please get blood work done when you are able to. Please submit a stool sample for colon cancer screening. Make sure you schedule an appointment with an eye doctor when you are able to. Wellness Visit for Adults   AMBULATORY CARE:   A wellness visit  is when you see your healthcare provider to get screened for health problems. Your healthcare provider will also give you advice on how to stay healthy. Write down your questions so you remember to ask them. Ask your healthcare provider how often you should have a wellness visit. What happens at a wellness visit:  Your healthcare provider will ask about your health, and your family history of health problems. This includes high blood pressure, heart disease, and cancer. He or she will ask if you have symptoms that concern you, if you smoke, and about your mood. You may also be asked about your intake of medicines, supplements, food, and alcohol. Any of the following may be done: Your weight  will be checked. Your height may also be checked so your body mass index (BMI) can be calculated. Your BMI shows if you are at a healthy weight. Your blood pressure  and heart rate will be checked. Your temperature may also be checked. Blood and urine tests  may be done. Blood tests may be done to check your cholesterol levels. Abnormal cholesterol levels increase your risk for heart disease and stroke. You may also need a blood or urine test to check for diabetes if you are at increased risk. Urine tests may be done to look for signs of an infection or kidney disease. A physical exam  includes checking your heartbeat and lungs with a stethoscope. Your healthcare provider may also check your skin to look for sun damage. Screening tests  may be recommended.  A screening test is done to check for diseases that may not cause symptoms. The screening tests you may need depend on your age, gender, family history, and lifestyle habits. For example, colorectal screening may be recommended if you are 48years old or older. Screening tests you need if you are a woman:   A Pap smear  is used to screen for cervical cancer. Pap smears are usually done every 3 to 5 years depending on your age. You may need them more often if you have had abnormal Pap smear test results in the past. Ask your healthcare provider how often you should have a Pap smear. A mammogram  is an x-ray of your breasts to screen for breast cancer. Experts recommend mammograms every 2 years starting at age 48 years. You may need a mammogram at age 52 years or younger if you have an increased risk for breast cancer. Talk to your healthcare provider about when you should start having mammograms and how often you need them. Vaccines you may need:   Get an influenza vaccine  every year. The influenza vaccine protects you from the flu. Several types of viruses cause the flu. The viruses change over time, so new vaccines are made each year. Get a tetanus-diphtheria (Td) booster vaccine  every 10 years. This vaccine protects you against tetanus and diphtheria. Tetanus is a severe infection that may cause painful muscle spasms and lockjaw. Diphtheria is a severe bacterial infection that causes a thick covering in the back of your mouth and throat. Get a human papillomavirus (HPV) vaccine  if you are female and aged 23 to 32 or male 23 to 24 and never received it. This vaccine protects you from HPV infection. HPV is the most common infection spread by sexual contact. HPV may also cause vaginal, penile, and anal cancers. Get a pneumococcal vaccine  if you are aged 72 years or older. The pneumococcal vaccine is an injection given to protect you from pneumococcal disease.  Pneumococcal disease is an infection caused by pneumococcal bacteria. The infection may cause pneumonia, meningitis, or an ear infection. Get a shingles vaccine  if you are 60 or older, even if you have had shingles before. The shingles vaccine is an injection to protect you from the varicella-zoster virus. This is the same virus that causes chickenpox. Shingles is a painful rash that develops in people who had chickenpox or have been exposed to the virus. How to eat healthy:  My Plate is a model for planning healthy meals. It shows the types and amounts of foods that should go on your plate. Fruits and vegetables make up about half of your plate, and grains and protein make up the other half. A serving of dairy is included on the side of your plate. The amount of calories and serving sizes you need depends on your age, gender, weight, and height. Examples of healthy foods are listed below:  Eat a variety of vegetables  such as dark green, red, and orange vegetables. You can also include canned vegetables low in sodium (salt) and frozen vegetables without added butter or sauces. Eat a variety of fresh fruits , canned fruit in 100% juice, frozen fruit, and dried fruit. Include whole grains. At least half of the grains you eat should be whole grains. Examples include whole-wheat bread, wheat pasta, brown rice, and whole-grain cereals such as oatmeal.    Eat a variety of protein foods such as seafood (fish and shellfish), lean meat, and poultry without skin (turkey and chicken). Examples of lean meats include pork leg, shoulder, or tenderloin, and beef round, sirloin, tenderloin, and extra lean ground beef. Other protein foods include eggs and egg substitutes, beans, peas, soy products, nuts, and seeds. Choose low-fat dairy products such as skim or 1% milk or low-fat yogurt, cheese, and cottage cheese. Limit unhealthy fats  such as butter, hard margarine, and shortening.        Exercise:  Exercise at least 30 minutes per day on most days of the week. Some examples of exercise include walking, biking, dancing, and swimming. You can also fit in more physical activity by taking the stairs instead of the elevator or parking farther away from stores. Include muscle strengthening activities 2 days each week. Regular exercise provides many health benefits. It helps you manage your weight, and decreases your risk for type 2 diabetes, heart disease, stroke, and high blood pressure. Exercise can also help improve your mood. Ask your healthcare provider about the best exercise plan for you. General health and safety guidelines:   Do not smoke. Nicotine and other chemicals in cigarettes and cigars can cause lung damage. Ask your healthcare provider for information if you currently smoke and need help to quit. E-cigarettes or smokeless tobacco still contain nicotine. Talk to your healthcare provider before you use these products. Limit alcohol. A drink of alcohol is 12 ounces of beer, 5 ounces of wine, or 1½ ounces of liquor. Lose weight, if needed. Being overweight increases your risk of certain health conditions. These include heart disease, high blood pressure, type 2 diabetes, and certain types of cancer. Protect your skin. Do not sunbathe or use tanning beds. Use sunscreen with a SPF 15 or higher. Apply sunscreen at least 15 minutes before you go outside. Reapply sunscreen every 2 hours. Wear protective clothing, hats, and sunglasses when you are outside. Drive safely. Always wear your seatbelt. Make sure everyone in your car wears a seatbelt. A seatbelt can save your life if you are in an accident. Do not use your cell phone when you are driving. This could distract you and cause an accident. Pull over if you need to make a call or send a text message. Practice safe sex. Use latex condoms if are sexually active and have more than one partner.  Your healthcare provider may recommend screening tests for sexually transmitted infections (STIs). Wear helmets, lifejackets, and protective gear. Always wear a helmet when you ride a bike or motorcycle, go skiing, or play sports that could cause a head injury. Wear protective equipment when you play sports. Wear a lifejacket when you are on a boat or doing water sports. © Copyright Francisco Javier Singh 2023 Information is for End User's use only and may not be sold, redistributed or otherwise used for commercial purposes. The above information is an  only. It is not intended as medical advice for individual conditions or treatments. Talk to your doctor, nurse or pharmacist before following any medical regimen to see if it is safe and effective for you.

## 2023-12-15 ENCOUNTER — OFFICE VISIT (OUTPATIENT)
Dept: DENTISTRY | Facility: CLINIC | Age: 58
End: 2023-12-15

## 2023-12-15 VITALS — HEART RATE: 67 BPM | SYSTOLIC BLOOD PRESSURE: 100 MMHG | DIASTOLIC BLOOD PRESSURE: 67 MMHG

## 2023-12-15 DIAGNOSIS — Z01.20 ENCOUNTER FOR DENTAL EXAMINATION AND CLEANING WITHOUT ABNORMAL FINDINGS: Primary | ICD-10-CM

## 2023-12-15 PROCEDURE — D4910 PERIODONTAL MAINTENANCE: HCPCS

## 2023-12-15 PROCEDURE — D0120 PERIODIC ORAL EVALUATION - ESTABLISHED PATIENT: HCPCS

## 2023-12-15 NOTE — DENTAL PROCEDURE DETAILS
PERIODIC EXAM, PERIO MT   REVIEWED MED HX: meds, allergies, health changes reviewed in [facility]. All consents signed. CHIEF CONCERN:  none   PAIN SCALE:  0  ASA CLASS:  2  PLAQUE:  mild    CALCULUS:  no calculus noted    BLEEDING:    light  STAIN :   none      ORAL HYGIENE:  good      PERIO: 4a. Improvement in gingival health    Hand scaled, polished and flossed. Max Denture and Babar RPD cleaned in US w/ water solution. Oral Hygiene Instruction:  recommended brushing 2 x daily for 2 minutes MIN, recommended flossing daily, reviewed dietary precautions. Dispensed: toothbrush, toothpaste and floss and denture brush w/ case. Visual and Tactile Intraoral/ Extraoral evaluation: Oral and Oropharyngeal cancer evaluation. No findings     Dr. Cas Troy  exam=   Reviewed with patient clinical and radiographic findings and patient verbalized understanding. All questions and concerns addressed. R and L TMJ pop, no pain.     REFERRALS: no referrals needed    CARIES FINDINGS: 0       TREATMENT  PLAN :   1) [address]    Next Recall: 6 month recall     Last Xrays 6-13-23  Last Panorex/ FMX : 2022

## 2023-12-28 ENCOUNTER — APPOINTMENT (OUTPATIENT)
Dept: LAB | Facility: CLINIC | Age: 58
End: 2023-12-28

## 2023-12-28 DIAGNOSIS — Z12.11 SCREENING FOR MALIGNANT NEOPLASM OF COLON: ICD-10-CM

## 2023-12-28 LAB — HEMOCCULT STL QL IA: NEGATIVE

## 2023-12-28 PROCEDURE — G0328 FECAL BLOOD SCRN IMMUNOASSAY: HCPCS

## 2024-01-08 DIAGNOSIS — E03.9 HYPOTHYROIDISM, UNSPECIFIED TYPE: ICD-10-CM

## 2024-01-08 RX ORDER — LEVOTHYROXINE SODIUM 0.03 MG/1
TABLET ORAL
Qty: 30 TABLET | Refills: 5 | Status: SHIPPED | OUTPATIENT
Start: 2024-01-08

## 2024-05-03 DIAGNOSIS — M06.041 RHEUMATOID ARTHRITIS INVOLVING BOTH HANDS WITH NEGATIVE RHEUMATOID FACTOR (HCC): ICD-10-CM

## 2024-05-03 DIAGNOSIS — M06.042 RHEUMATOID ARTHRITIS INVOLVING BOTH HANDS WITH NEGATIVE RHEUMATOID FACTOR (HCC): ICD-10-CM

## 2024-05-06 RX ORDER — HYDROXYCHLOROQUINE SULFATE 200 MG/1
200 TABLET, FILM COATED ORAL
Qty: 90 TABLET | Refills: 3 | Status: SHIPPED | OUTPATIENT
Start: 2024-05-06 | End: 2025-05-01

## 2024-05-13 ENCOUNTER — ANNUAL EXAM (OUTPATIENT)
Dept: OBGYN CLINIC | Facility: CLINIC | Age: 59
End: 2024-05-13

## 2024-05-13 VITALS
SYSTOLIC BLOOD PRESSURE: 116 MMHG | DIASTOLIC BLOOD PRESSURE: 76 MMHG | HEART RATE: 72 BPM | BODY MASS INDEX: 27.54 KG/M2 | RESPIRATION RATE: 18 BRPM | HEIGHT: 59 IN | WEIGHT: 136.6 LBS

## 2024-05-13 DIAGNOSIS — Z11.3 SCREENING FOR STD (SEXUALLY TRANSMITTED DISEASE): ICD-10-CM

## 2024-05-13 DIAGNOSIS — Z59.9 FINANCIAL DIFFICULTIES: ICD-10-CM

## 2024-05-13 DIAGNOSIS — Z72.51 HIGH RISK SEXUAL BEHAVIOR, UNSPECIFIED TYPE: Primary | ICD-10-CM

## 2024-05-13 PROCEDURE — 87591 N.GONORRHOEAE DNA AMP PROB: CPT | Performed by: OBSTETRICS & GYNECOLOGY

## 2024-05-13 PROCEDURE — 87491 CHLMYD TRACH DNA AMP PROBE: CPT | Performed by: OBSTETRICS & GYNECOLOGY

## 2024-05-13 PROCEDURE — 99396 PREV VISIT EST AGE 40-64: CPT | Performed by: OBSTETRICS & GYNECOLOGY

## 2024-05-13 SDOH — ECONOMIC STABILITY - INCOME SECURITY: PROBLEM RELATED TO HOUSING AND ECONOMIC CIRCUMSTANCES, UNSPECIFIED: Z59.9

## 2024-05-13 NOTE — PROGRESS NOTES
Subjective      Alexandria Morrison is a 58 y.o. female who presents for annual well woman exam.    She has been feeling some pain in her L side, above her hip. It feels like pressure, 2/10. Started about 2 weeks ago, daily pressure that starts around 10:00am. She does not recall any inciting event; the pain is not particularly bothersome to her. She does not sleep on her sides, as she reports she has Varicose veins in her legs and will have numbness in her legs after lying on them. Encouraged her to speak to her PCP about these leg symptoms.    Pt went through menopause at 49yo; no bleeding, spotting, or discharge since.  She currently lives in a house with her 21yo daughter. She works as a . She feels safe in her home and in her work. No sexual activity in past 6 months; requests STI testing. She was tested for HIV, Hep C, Hep B, syphilis in ; these were negative.    Current contraception: post menopausal status  Patient is not sexually active in past 6 months.  Patient requests STI testing today: yes  History of abnormal Pap smear: no; most recent NILM/HPV Neg (10/19/21)    Regular self breast exam: sometimes, recommend she start to better familiarize herself with her normal breast architecture  History of abnormal mammogram: no  Family history of breast, endometrial, ovarian, colon, or prostate cancer: no  History of abnormal lipids: yes - slight elevations, monitored by PCP. No medications.  Colon cancer screening: yes; year FIT testing, most recent 23- negative.  Gardasil vaccine: No   Cigarette smoking: No  EtOH: Social, rare.  Recreational drug use: No    Menstrual History:  OB History          2    Para   1    Term   1            AB   1    Living   1         SAB        IAB        Ectopic        Multiple        Live Births                    Menarche age: 10  No LMP recorded. Patient is postmenopausal.       The following portions of the patient's history were  "reviewed and updated as appropriate: allergies, current medications, past family history, past medical history, past social history, past surgical history, and problem list.    S/p CS in 2004, d/t h/o fibroids        Past Medical History:   Diagnosis Date    Hypothyroidism     Periodontal disease 2022         Review of Systems  Review of Systems   Constitutional:  Negative for chills and fever.   HENT:  Negative for sore throat.    Eyes:  Negative for visual disturbance.   Respiratory:  Negative for cough and shortness of breath.    Cardiovascular:  Negative for chest pain and palpitations.   Gastrointestinal:  Negative for abdominal pain, blood in stool, constipation, diarrhea, nausea and vomiting.   Genitourinary:  Positive for frequency (Increased nocturia, 2-3x/night). Negative for dysuria and hematuria.   Skin:  Negative for rash.   Neurological:  Negative for dizziness and headaches.           Objective      /76 (BP Location: Left arm, Patient Position: Sitting, Cuff Size: Adult)   Pulse 72   Resp 18   Ht 4' 11.25\" (1.505 m)   Wt 62 kg (136 lb 9.6 oz)   BMI 27.36 kg/m²     Physical Exam  Constitutional:       Appearance: Normal appearance.   Genitourinary:      No lesions in the vagina.      Right Labia: No tenderness or lesions.     Left Labia: No tenderness or lesions.     No vaginal erythema or tenderness.        Right Adnexa: not tender and no mass present.     Left Adnexa: not tender and no mass present.     No cervical motion tenderness, discharge or lesion.      Uterus is not fixed, tender or irregular.   Breasts:     Right: No mass, nipple discharge, skin change or tenderness.      Left: No mass, nipple discharge, skin change or tenderness.   Pulmonary:      Effort: Pulmonary effort is normal. No respiratory distress.   Abdominal:      General: There is no distension.      Palpations: Abdomen is soft.      Tenderness: There is no abdominal tenderness. There is no guarding or " rebound.   Musculoskeletal:         General: No swelling or tenderness.   Lymphadenopathy:      Upper Body:      Right upper body: No axillary adenopathy.      Left upper body: No axillary adenopathy.   Neurological:      Mental Status: She is alert.   Skin:     General: Skin is warm.      Coloration: Skin is not pale.      Findings: No erythema.          Depression Screening Follow-up Plan: Patient's depression screening was positive with a PHQ-2 score of 0. Their PHQ-9 score was 0. Clinically patient does not have depression. No treatment is required.     Assessment/Plan:      1.  Routine well woman exam done today.  2.  Pap and HPV:Pap with HPV was not done today.  Current ASCCP Guidelines reviewed.   3.  Discussed nocturia; if not improved after avoiding fluids before bed, recommend re-presenting to discuss.   4. Mammogram ordered. Recommend yearly mammography.   5.  Colonoscopy recommended per guidelines.   6. The patient is not currently sexually active.   7. The following were reviewed in today's visit: breast self exam, STD testing, HIV risk factors and prevention, menopause, osteoporosis, adequate intake of calcium and vitamin D, exercise, healthy diet.  8. Patient to return to office in 12 months for annual, or sooner if needed.       D/w Dr. Torsten Suarez MD  OB/GYN PGY-4  5/13/2024  11:16 AM

## 2024-05-14 LAB
C TRACH DNA SPEC QL NAA+PROBE: NEGATIVE
N GONORRHOEA DNA SPEC QL NAA+PROBE: NEGATIVE

## 2024-05-17 ENCOUNTER — PATIENT OUTREACH (OUTPATIENT)
Dept: OBGYN CLINIC | Facility: CLINIC | Age: 59
End: 2024-05-17

## 2024-05-17 NOTE — PROGRESS NOTES
MARCELLUS EMMANUEL spoke with 59 y/o-S- P1-  Occ[place] speaking woman to address her needs. MARCELLUS EMMANUEL introduced self and discussed the reason for the call. Pt resides with her 21 y/o daughter. Pt an daughter are employed. Pt states some times is hard to keep up with her financial responsibility. Pt participate of [employer] food back. Pt main concern is getting her discount at . Pt is working on her application. Pt denies other needs and thanks MARCELLUS EMMANUEL for reaching out.

## 2024-05-21 NOTE — RESULT ENCOUNTER NOTE
Spoke with patient to confirm neg GCCT screen. Advised blood work has an active order and we would call once resulted. Patient stated understanding and had no further questions.

## 2024-05-29 ENCOUNTER — APPOINTMENT (OUTPATIENT)
Dept: LAB | Facility: CLINIC | Age: 59
End: 2024-05-29

## 2024-05-29 DIAGNOSIS — Z72.51 HIGH RISK SEXUAL BEHAVIOR, UNSPECIFIED TYPE: ICD-10-CM

## 2024-05-29 LAB
HBV SURFACE AG SER QL: NORMAL
HIV 1+2 AB+HIV1 P24 AG SERPL QL IA: NORMAL
HIV 2 AB SERPL QL IA: NORMAL
HIV1 AB SERPL QL IA: NORMAL
HIV1 P24 AG SERPL QL IA: NORMAL
TREPONEMA PALLIDUM IGG+IGM AB [PRESENCE] IN SERUM OR PLASMA BY IMMUNOASSAY: NORMAL

## 2024-05-29 PROCEDURE — 86780 TREPONEMA PALLIDUM: CPT

## 2024-05-29 PROCEDURE — 36415 COLL VENOUS BLD VENIPUNCTURE: CPT

## 2024-05-29 PROCEDURE — 87340 HEPATITIS B SURFACE AG IA: CPT

## 2024-05-29 PROCEDURE — 86803 HEPATITIS C AB TEST: CPT

## 2024-05-29 PROCEDURE — 87389 HIV-1 AG W/HIV-1&-2 AB AG IA: CPT

## 2024-05-30 LAB
HCV AB S/CO SERPL IA: NON REACTIVE
SL AMB INTERPRETATION: NORMAL

## 2024-06-10 ENCOUNTER — APPOINTMENT (OUTPATIENT)
Dept: LAB | Facility: CLINIC | Age: 59
End: 2024-06-10

## 2024-06-10 ENCOUNTER — OFFICE VISIT (OUTPATIENT)
Dept: INTERNAL MEDICINE CLINIC | Facility: CLINIC | Age: 59
End: 2024-06-10

## 2024-06-10 VITALS
HEART RATE: 70 BPM | WEIGHT: 136 LBS | TEMPERATURE: 98 F | BODY MASS INDEX: 27.24 KG/M2 | DIASTOLIC BLOOD PRESSURE: 69 MMHG | OXYGEN SATURATION: 99 % | SYSTOLIC BLOOD PRESSURE: 105 MMHG

## 2024-06-10 DIAGNOSIS — R51.9 LEFT-SIDED HEADACHE: Primary | ICD-10-CM

## 2024-06-10 DIAGNOSIS — E03.8 OTHER SPECIFIED HYPOTHYROIDISM: ICD-10-CM

## 2024-06-10 DIAGNOSIS — M06.09 RHEUMATOID ARTHRITIS OF MULTIPLE SITES WITH NEGATIVE RHEUMATOID FACTOR (HCC): ICD-10-CM

## 2024-06-10 DIAGNOSIS — G56.02 CARPAL TUNNEL SYNDROME OF LEFT WRIST: ICD-10-CM

## 2024-06-10 DIAGNOSIS — Z12.31 ENCOUNTER FOR SCREENING MAMMOGRAM FOR BREAST CANCER: ICD-10-CM

## 2024-06-10 PROBLEM — R32 URINARY INCONTINENCE: Status: RESOLVED | Noted: 2022-02-08 | Resolved: 2024-06-10

## 2024-06-10 PROBLEM — K80.20 SYMPTOMATIC CHOLELITHIASIS: Status: RESOLVED | Noted: 2023-01-14 | Resolved: 2024-06-10

## 2024-06-10 LAB — TSH SERPL DL<=0.05 MIU/L-ACNC: 2.82 UIU/ML (ref 0.45–4.5)

## 2024-06-10 PROCEDURE — 99213 OFFICE O/P EST LOW 20 MIN: CPT | Performed by: HOSPITALIST

## 2024-06-10 PROCEDURE — 84443 ASSAY THYROID STIM HORMONE: CPT

## 2024-06-10 PROCEDURE — 36415 COLL VENOUS BLD VENIPUNCTURE: CPT

## 2024-06-10 NOTE — PATIENT INSTRUCTIONS
Please obtain labs today  Schedule MRI of brain  Make mammogram appointment  Make eye doctor appointment   wrist brace from pharmacy

## 2024-06-11 NOTE — PROGRESS NOTES
INTERNAL MEDICINE FOLLOW-UP OFFICE VISIT  [facility]  [address]  [place] [address]    NAME: Alexandria Morrison  AGE: 58 y.o. SEX: female    DATE OF ENCOUNTER: 6/11/2024    Assessment and Plan     1. Left-sided headache  Patient presenting with evaluation of new-onset unilateral headache. Localized to L side. Described as constant pressure-like sensation. No trauma, no LOC. Episodes can last up 30 minutes but have now been occurring daily. HA is not specific to partricular time of day but is interfering quality of life. No preceding aura. No photophobia. No other neurological symptoms including dizziness, dysarthria, numbness in extremities. No n/v, fever/chills. No unexpected weight loss. No tearing from eyes. HA is not positional. No recent outside travel or illnesses. Routine mammogram not up to date.  VSS, afebrile, 105/69 in office. Patient anxious-appearing on exam, CNII-XII grossly intact. No focal neuro-deficits; finger to nose, heel to shin normal. strength 5/5 in all extremities, sensations intact to light touch.  A&Ox3. Gait appropriate. Point tttp to upper trapezius on L. No midline ttp. No neck stiffness.    Etiology unclear. No evidence of GCA, no obvious deficits to suggest stroke. Unlikely migraine/cluster/tension. Remains normotensive. No systemic symptoms. May have component of occipital neuralgia given upper trap ttp however L frontal involvement makes it less likely.   Plan:  -Although patient is without focal deficits, however due to headache being new-onset in nature and persistent quality of unclear etiology based on presentation and unrevealing exam, will obtain MRI neuroimaging for to assess for potential intracranial pathology for further evaluation  ---MRI brain w wo contrast; Future  -Referral for screening mammogram provided.  -Advised adequate hydration, rest, refrain from excessive caffeine intake  -OTC  Tylenol/NSAIDs on prn basis only  -Return precautions provided for new or worsening symptoms provided to patient, who verbalized understanding  -Follow up in 4-6 weeks     2. Carpal tunnel syndrome of left wrist  Patient reports L wrist and L hand pain as pins and needles, attributes to overuse at work.   Patient is self pay.   Tinel's sign and Phalen's test positive suggestive of carpal tunnel syndrome  Plan: will trial immobilization with wrist splint due to self-pay status.   Can consider corticosteroids vs PT at subsequent follow up if no relief with immobilization.   - Cock Up Wrist Splint    3. Hypothyroidism  Compliant on levothyroxine 25mcg daily, remains asymptomatic.  - TSH, 3rd generation; Future    4. Rheumatoid arthritis of multiple sites with negative rheumatoid factor (HCC)  Known hx, seronegative.   - Ambulatory Referral to Ophthalmology for routine eye exam due to isadora being on chronic hydroxychloroquine theraphy    5. Encounter for screening mammogram for breast cancer  - Mammo screening bilateral w 3d & cad; Future    Orders Placed This Encounter   Procedures   • Cock Up Wrist Splint   • Mammo screening bilateral w 3d & cad   • MRI brain w wo contrast   • TSH, 3rd generation   • Ambulatory Referral to Ophthalmology       - Counseling Documentation: patient was counseled regarding: diagnostic results, instructions for management, risk factor reductions, prognosis, patient and family education, impressions, risks and benefits of treatment options, and importance of compliance with treatment    BMI Counseling: Body mass index is 27.24 kg/m². The BMI is above normal. Nutrition recommendations include reducing portion sizes, decreasing overall calorie intake, 3-5 servings of fruits/vegetables daily, reducing fast food intake, consuming healthier snacks, decreasing soda and/or juice intake, moderation in carbohydrate intake, increasing intake of lean protein, and reducing intake of saturated fat and  trans fat. Exercise recommendations include moderate aerobic physical activity for 150 minutes/week.     Chief Complaint     Chief Complaint   Patient presents with   • Follow-up     Chronic condition   • Headache     Left side down to left shoulder , more then 2 months       History of Present Illness     Utilized [employer] translating service via [place]an interpretor #[id number] to obtain history noted below    Patient is a 58 year F with hx of hypothyroidism, seronegative RA on chronically on Plaquenil, HLD. Presenting with evaluationof new-onset unilateral headache. Localized to L side. Described as constant pressure-like sensation. HA episodes can last up 30 minutes but have been occurring daily. HA is not specific to partricular time of day. States JUDD is not interfering with quality of life. No preceding aura. Not exacerbated by bright lights or loud sounds. No other neurological symptoms reported including dizziness, lightheadedness. No n/v. No fever/chill. No unexpected weight loss.  No vision or auditory changes. No pain with ocular movement. No tearing from eyes. HA is not positional. No reported hx of neurological disorders. No recent outside travel reported. No recent illnesses reported. Compliant with hydroxychlorquine and levothyroxine tx for RA and hypothyroidism, respectively. Mammogram not up to date. Patient is self pay.   Additionally, patient reports L wrist and L hand pain as pins and needles, attributes to overuse at work. Patient is self pay.     The following portions of the patient's history were reviewed and updated as appropriate: allergies, current medications, past family history, past medical history, past social history, past surgical history and problem list.    Review of Systems     Review of Systems  All ROS negative unless otherwise stated in the HPI    Active Problem List     Patient Active Problem List   Diagnosis   • Periodontal disease   • Rheumatoid arthritis of multiple sites with  negative rheumatoid factor (HCC)   • Fibroids, intramural   • Varicose veins of left lower extremity with pain   • Carpal tunnel syndrome of left wrist       Objective     /69 (BP Location: Left arm, Patient Position: Sitting, Cuff Size: Standard)   Pulse 70   Temp 98 °F (36.7 °C) (Temporal)   Wt 61.7 kg (136 lb)   SpO2 99%   BMI 27.24 kg/m²     Physical Exam  Constitutional:       General: She is not in acute distress.     Appearance: Normal appearance. She is not ill-appearing or toxic-appearing.   HENT:      Nose: Nose normal.      Mouth/Throat:      Mouth: Mucous membranes are moist.   Eyes:      Conjunctiva/sclera: Conjunctivae normal.   Cardiovascular:      Rate and Rhythm: Normal rate and regular rhythm.      Pulses: Normal pulses.      Heart sounds: Normal heart sounds. No murmur heard.     No friction rub. No gallop.   Pulmonary:      Effort: Pulmonary effort is normal.      Breath sounds: Normal breath sounds. No wheezing or rhonchi.   Abdominal:      General: There is no distension.      Palpations: Abdomen is soft.      Tenderness: There is no abdominal tenderness.   Musculoskeletal:      Right lower leg: No edema.      Left lower leg: No edema.   Skin:     Capillary Refill: Capillary refill takes less than 2 seconds.      Findings: No rash.   Neurological:      General: No focal deficit present.      Mental Status: She is alert and oriented to person, place, and time. Mental status is at baseline.      Cranial Nerves: No cranial nerve deficit.      Sensory: No sensory deficit.      Motor: No weakness.      Coordination: Coordination normal.      Gait: Gait normal.   Psychiatric:      Comments: Anxious appearing       Pertinent Laboratory/Diagnostic Studies:  CBC:   Lab Results   Component Value Date/Time    WBC 6.57 12/08/2023 08:54 AM    WBC 6.43 10/13/2014 12:27 PM    RBC 4.18 12/08/2023 08:54 AM    RBC 4.37 10/13/2014 12:27 PM    HGB 12.9 12/08/2023 08:54 AM    HGB 13.1 10/13/2014 12:27 PM     HCT 38.6 12/08/2023 08:54 AM    HCT 38.6 10/13/2014 12:27 PM    MCV 92 12/08/2023 08:54 AM    MCV 88 10/13/2014 12:27 PM    MCH 30.9 12/08/2023 08:54 AM    MCH 30.0 10/13/2014 12:27 PM    MCHC 33.4 12/08/2023 08:54 AM    MCHC 33.9 10/13/2014 12:27 PM    RDW 12.4 12/08/2023 08:54 AM    RDW 12.8 10/13/2014 12:27 PM    MPV 10.2 12/08/2023 08:54 AM    MPV 10.3 10/13/2014 12:27 PM     12/08/2023 08:54 AM     10/13/2014 12:27 PM    NRBC 0 12/08/2023 08:54 AM    NEUTOPHILPCT 67 12/08/2023 08:54 AM    NEUTOPHILPCT 54 10/13/2014 12:27 PM    LYMPHOPCT 25 12/08/2023 08:54 AM    LYMPHOPCT 35 10/13/2014 12:27 PM    MONOPCT 4 12/08/2023 08:54 AM    MONOPCT 5 10/13/2014 12:27 PM    EOSPCT 3 12/08/2023 08:54 AM    EOSPCT 5 10/13/2014 12:27 PM    BASOPCT 1 12/08/2023 08:54 AM    BASOPCT 1 10/13/2014 12:27 PM    NEUTROABS 4.40 12/08/2023 08:54 AM    NEUTROABS 3.47 10/13/2014 12:27 PM    LYMPHSABS 1.62 12/08/2023 08:54 AM    LYMPHSABS 2.25 10/13/2014 12:27 PM    MONOSABS 0.27 12/08/2023 08:54 AM    MONOSABS 0.32 10/13/2014 12:27 PM    EOSABS 0.20 12/08/2023 08:54 AM    EOSABS 0.32 10/13/2014 12:27 PM     Chemistry Profile:   Lab Results   Component Value Date/Time     10/13/2014 12:27 PM    K 4.1 12/08/2023 08:54 AM    K 3.8 10/13/2014 12:27 PM     12/08/2023 08:54 AM     10/13/2014 12:27 PM    CO2 28 12/08/2023 08:54 AM    CO2 30 10/13/2014 12:27 PM    ANIONGAP 6 10/13/2014 12:27 PM    BUN 10 12/08/2023 08:54 AM    BUN 13 10/13/2014 12:27 PM    CREATININE 0.52 (L) 12/08/2023 08:54 AM    CREATININE 0.59 (L) 10/13/2014 12:27 PM    GLUC 111 12/08/2023 08:54 AM    GLUF 115 (H) 09/13/2022 08:08 AM    GLUCOSE 103 10/13/2014 12:27 PM    CALCIUM 10.0 12/08/2023 08:54 AM    CALCIUM 9.8 10/13/2014 12:27 PM    CORRECTEDCA 10.0 01/16/2023 05:53 AM    MG 2.0 01/16/2023 05:53 AM    PHOS 5.0 (H) 01/16/2023 05:53 AM    AST 56 (H) 01/16/2023 05:53 AM    AST 21 10/13/2014 12:27 PM     (H) 01/16/2023 05:53 AM  "   ALT 29 10/13/2014 12:27 PM    ALKPHOS 416 (H) 01/16/2023 05:53 AM    ALKPHOS 130 10/13/2014 12:27 PM    PROT 8.0 10/13/2014 12:27 PM    BILITOT 0.97 10/13/2014 12:27 PM    EGFR 105 12/08/2023 08:54 AM     Endocrine Studies:   Results from Last 12 Months   Lab Units 06/10/24  1011 07/25/23  0829   TSH 3RD GENERATON uIU/mL 2.817 3.231   TRIGLYCERIDES mg/dL  --  72   CHOLESTEROL mg/dL  --  193   HDL mg/dL  --  67   LDL CALC mg/dL  --  112*     Iron Studies: No results for input(s): \"LABIRON\", \"IRON\", \"TIBC\", \"FERRITIN\" in the last 8784 hours.  Health Maintenance:   Results from Last 12 Months   Lab Units 05/29/24  1040   HEP C AB  Non Reactive       Current Medications     Current Outpatient Medications:   •  hydroxychloroquine (PLAQUENIL) 200 mg tablet, Take 1 tablet (200 mg total) by mouth daily with breakfast, Disp: 90 tablet, Rfl: 3  •  levothyroxine 25 mcg tablet, TAKE 1 AND 1/2 TABLET (37.5 MCG TOTAL) BY MOUTH DAILY IN THE EARLY MORNING, Disp: 30 tablet, Rfl: 5    Health Maintenance     Health Maintenance   Topic Date Due   • Zoster Vaccine (1 of 2) Never done   • Breast Cancer Screening: Mammogram  10/20/2022   • COVID-19 Vaccine (5 - 2023-24 season) 09/01/2023   • BMI: Followup Plan  01/23/2024   • Cervical Cancer Screening  10/19/2024   • RSV Vaccine Age 60+ Years (1 - 1-dose 60+ series) 12/24/2024   • Colorectal Cancer Screening  12/28/2024   • Depression Screening  05/13/2025   • Annual Physical  05/13/2025   • BMI: Adult  06/10/2025   • DTaP,Tdap,and Td Vaccines (4 - Td or Tdap) 11/11/2032   • HIV Screening  Completed   • Hepatitis C Screening  Completed   • Influenza Vaccine  Completed   • RSV Vaccine age 0-20 Months  Aged Out   • Pneumococcal Vaccine: Pediatrics (0 to 5 Years) and At-Risk Patients (6 to 64 Years)  Aged Out   • HIB Vaccine  Aged Out   • IPV Vaccine  Aged Out   • Hepatitis A Vaccine  Aged Out   • Meningococcal ACWY Vaccine  Aged Out   • HPV Vaccine  Aged Out     Immunization History "   Administered Date(s) Administered   • COVID-19 PFIZER VACCINE 0.3 ML IM 04/10/2021, 05/01/2021, 12/04/2021, 07/13/2022   • Influenza, injectable, quadrivalent, preservative free 0.5 mL 12/08/2023   • Influenza, recombinant, quadrivalent,injectable, preservative free 10/26/2021   • Influenza, seasonal, injectable 10/15/2012, 11/18/2013, 10/06/2014, 10/18/2016   • Pneumococcal Conjugate Vaccine 20-valent (Pcv20), Polysace 06/21/2023   • Tdap 10/15/2012, 11/11/2022         ----------------------------------------------------  Huong Wilkinson DO, PGY-2  Internal Medicine Residency   [facility] [place]

## 2024-06-20 ENCOUNTER — HOSPITAL ENCOUNTER (OUTPATIENT)
Dept: RADIOLOGY | Facility: HOSPITAL | Age: 59
Discharge: HOME | End: 2024-06-20

## 2024-06-20 DIAGNOSIS — R51.9 LEFT-SIDED HEADACHE: ICD-10-CM

## 2024-06-20 PROCEDURE — 70553 MRI BRAIN STEM W/O & W/DYE: CPT

## 2024-06-20 PROCEDURE — A9585 GADOBUTROL INJECTION: HCPCS

## 2024-06-20 RX ORDER — GADOBUTROL 604.72 MG/ML
6 INJECTION INTRAVENOUS
Status: COMPLETED | OUTPATIENT
Start: 2024-06-20 | End: 2024-06-20

## 2024-06-20 RX ADMIN — GADOBUTROL 6 ML: 604.72 INJECTION INTRAVENOUS at 20:48

## 2024-07-15 DIAGNOSIS — G93.0 ARACHNOID CYST: ICD-10-CM

## 2024-07-15 DIAGNOSIS — R51.9 HEADACHE, CHRONIC DAILY: Primary | ICD-10-CM

## 2024-07-31 ENCOUNTER — OFFICE VISIT (OUTPATIENT)
Dept: INTERNAL MEDICINE CLINIC | Facility: CLINIC | Age: 59
End: 2024-07-31

## 2024-07-31 VITALS
BODY MASS INDEX: 27.08 KG/M2 | WEIGHT: 135.2 LBS | TEMPERATURE: 98.6 F | SYSTOLIC BLOOD PRESSURE: 105 MMHG | DIASTOLIC BLOOD PRESSURE: 72 MMHG | HEART RATE: 66 BPM | OXYGEN SATURATION: 98 %

## 2024-07-31 DIAGNOSIS — Z59.9 FINANCIAL DIFFICULTIES: ICD-10-CM

## 2024-07-31 DIAGNOSIS — M06.09 RHEUMATOID ARTHRITIS OF MULTIPLE SITES WITH NEGATIVE RHEUMATOID FACTOR (HCC): ICD-10-CM

## 2024-07-31 DIAGNOSIS — G93.0 ARACHNOID CYST: ICD-10-CM

## 2024-07-31 DIAGNOSIS — G44.89 OTHER HEADACHE SYNDROME: ICD-10-CM

## 2024-07-31 DIAGNOSIS — Z23 ENCOUNTER FOR IMMUNIZATION: Primary | ICD-10-CM

## 2024-07-31 PROBLEM — R51.9 HEADACHE: Status: ACTIVE | Noted: 2024-07-31

## 2024-07-31 PROCEDURE — 90471 IMMUNIZATION ADMIN: CPT | Performed by: INTERNAL MEDICINE

## 2024-07-31 PROCEDURE — 90750 HZV VACC RECOMBINANT IM: CPT | Performed by: INTERNAL MEDICINE

## 2024-07-31 SDOH — ECONOMIC STABILITY - INCOME SECURITY: PROBLEM RELATED TO HOUSING AND ECONOMIC CIRCUMSTANCES, UNSPECIFIED: Z59.9

## 2024-07-31 NOTE — PROGRESS NOTES
INTERNAL MEDICINE FOLLOW-UP OFFICE VISIT  [facility]  [address]  [place] [address]    NAME: Alexandria Morrison  AGE: 58 y.o. SEX: female    DATE OF ENCOUNTER: 7/31/2024    Assessment and Plan       1. Headache  2. Arachnoid cyst  Was last seen in office 6/2024 for new onset L sided headache, mild to moderate in intensity and otherwise uncharacteristic of migraine. Non-focal on exam.   MRI obtained 6/20/2024 to rule out CNS etiology, revealed small left temporal arachnoid cyst with mild underlying mass effect. Probable additional arachnoid cyst at the left vertex   Patient reports improvement in HA intensity and frequency, has been taking OTC NSAIDs / Tylenol intermittently  No focal neurodeficits on exam today, CN2-12 intact, strength 5/5 in all extremities, sensation to light touch grossly intac  Plan:  - Continue conservative management including OTC analgesics   - Discussed need for specialist evaluation given symptomatic cyst likely explanation of her ongoing L sided headaches with presentation not suggestive of alternative etiologies including migraines/tension/cluster.   - Ambulatory Referral to Neurosurgery; Future      3. Encounter for immunization  - Zoster Vaccine Recombinant IM    4. Financial difficulties  Patient remains self-pay  Plan: referral for financial assistance provided    Has follow up appointment scheduled with ophthalmology for routine eye examination in setting of chronic hydroxychlorquine use  Has mammogram scheduled for routine breast cancer screening    No orders of the defined types were placed in this encounter.      - Counseling Documentation: patient was counseled regarding: diagnostic results, instructions for management, risk factor reductions, prognosis, patient and family education, impressions, risks and benefits of treatment options, and importance of compliance with treatment    BMI Counseling: Body mass  index is 27.08 kg/m². The BMI is above normal. Nutrition recommendations include reducing portion sizes, decreasing overall calorie intake, 3-5 servings of fruits/vegetables daily, reducing fast food intake, and consuming healthier snacks. Exercise recommendations include moderate aerobic physical activity for 150 minutes/week.     Chief Complaint     Chief Complaint   Patient presents with   • Follow-up       History of Present Illness     58 year F with hx of hypothyroidism, seronegative RA on chronically on Plaquenil, HLD. Presenting w follow up evaluation of new-onset unilateral headache. Localized to L side. Described as pressure-like sensation. HA episodes can last up 30 minutes.   JUDD is not specific to partricular time of day. JUDD is not interfering with quality of life. No preceding aura. Not exacerbated by bright lights or loud sounds. No other neurological symptoms reported including dizziness, lightheadedness. No n/v. No fever/chill. No unexpected weight loss.  No vision or auditory changes. No pain with ocular movement. No tearing from eyes. HA is not positional. No reported hx of neurological disorders. No recent outside travel reported. No recent illnesses reported.     MRI obtained 6/20/2024 to rule out CNS etiology, revealed small left temporal arachnoid cyst with mild underlying mass effect. Probable additional arachnoid cyst at the left vertex. Today, patient reports improvement in HA intensity and frequency, has been taking OTC NSAIDs / Tylenol intermittently    Compliant with hydroxychlorquine and levothyroxine tx for RA and hypothyroidism, respectively. Mammogram not up to date. Patient is self       The following portions of the patient's history were reviewed and updated as appropriate: allergies, current medications, past family history, past medical history, past social history, past surgical history and problem list.    Review of Systems     Review of Systems  All ROS negative unless  otherwise stated in the HPI    Active Problem List     Patient Active Problem List   Diagnosis   • Periodontal disease   • Rheumatoid arthritis of multiple sites with negative rheumatoid factor (HCC)   • Fibroids, intramural   • Varicose veins of left lower extremity with pain   • Carpal tunnel syndrome of left wrist       Objective     /72 (BP Location: Left arm, Patient Position: Sitting, Cuff Size: Standard)   Pulse 66   Temp 98.6 °F (37 °C) (Temporal)   Wt 61.3 kg (135 lb 3.2 oz)   SpO2 98%   BMI 27.08 kg/m²     Physical Exam  Constitutional:       General: She is not in acute distress.     Appearance: Normal appearance. She is not ill-appearing or toxic-appearing.   HENT:      Nose: Nose normal.      Mouth/Throat:      Mouth: Mucous membranes are moist.   Eyes:      Conjunctiva/sclera: Conjunctivae normal.   Cardiovascular:      Rate and Rhythm: Normal rate and regular rhythm.      Pulses: Normal pulses.      Heart sounds: Normal heart sounds. No murmur heard.     No friction rub. No gallop.   Pulmonary:      Effort: Pulmonary effort is normal.      Breath sounds: Normal breath sounds. No wheezing or rhonchi.   Abdominal:      General: There is no distension.      Palpations: Abdomen is soft.      Tenderness: There is no abdominal tenderness.   Musculoskeletal:      Right lower leg: No edema.      Left lower leg: No edema.   Skin:     Capillary Refill: Capillary refill takes less than 2 seconds.      Findings: No rash.   Neurological:      General: No focal deficit present.      Mental Status: She is alert and oriented to person, place, and time. Mental status is at baseline.      Cranial Nerves: No cranial nerve deficit.      Sensory: No sensory deficit.      Motor: No weakness.      Gait: Gait normal.     Pertinent Laboratory/Diagnostic Studies:  CBC:   Lab Results   Component Value Date/Time    WBC 6.57 12/08/2023 08:54 AM    WBC 6.43 10/13/2014 12:27 PM    RBC 4.18 12/08/2023 08:54 AM    RBC 4.37  10/13/2014 12:27 PM    HGB 12.9 12/08/2023 08:54 AM    HGB 13.1 10/13/2014 12:27 PM    HCT 38.6 12/08/2023 08:54 AM    HCT 38.6 10/13/2014 12:27 PM    MCV 92 12/08/2023 08:54 AM    MCV 88 10/13/2014 12:27 PM    MCH 30.9 12/08/2023 08:54 AM    MCH 30.0 10/13/2014 12:27 PM    MCHC 33.4 12/08/2023 08:54 AM    MCHC 33.9 10/13/2014 12:27 PM    RDW 12.4 12/08/2023 08:54 AM    RDW 12.8 10/13/2014 12:27 PM    MPV 10.2 12/08/2023 08:54 AM    MPV 10.3 10/13/2014 12:27 PM     12/08/2023 08:54 AM     10/13/2014 12:27 PM    NRBC 0 12/08/2023 08:54 AM    NEUTOPHILPCT 67 12/08/2023 08:54 AM    NEUTOPHILPCT 54 10/13/2014 12:27 PM    LYMPHOPCT 25 12/08/2023 08:54 AM    LYMPHOPCT 35 10/13/2014 12:27 PM    MONOPCT 4 12/08/2023 08:54 AM    MONOPCT 5 10/13/2014 12:27 PM    EOSPCT 3 12/08/2023 08:54 AM    EOSPCT 5 10/13/2014 12:27 PM    BASOPCT 1 12/08/2023 08:54 AM    BASOPCT 1 10/13/2014 12:27 PM    NEUTROABS 4.40 12/08/2023 08:54 AM    NEUTROABS 3.47 10/13/2014 12:27 PM    LYMPHSABS 1.62 12/08/2023 08:54 AM    LYMPHSABS 2.25 10/13/2014 12:27 PM    MONOSABS 0.27 12/08/2023 08:54 AM    MONOSABS 0.32 10/13/2014 12:27 PM    EOSABS 0.20 12/08/2023 08:54 AM    EOSABS 0.32 10/13/2014 12:27 PM     Chemistry Profile:   Lab Results   Component Value Date/Time     10/13/2014 12:27 PM    K 4.1 12/08/2023 08:54 AM    K 3.8 10/13/2014 12:27 PM     12/08/2023 08:54 AM     10/13/2014 12:27 PM    CO2 28 12/08/2023 08:54 AM    CO2 30 10/13/2014 12:27 PM    ANIONGAP 6 10/13/2014 12:27 PM    BUN 10 12/08/2023 08:54 AM    BUN 13 10/13/2014 12:27 PM    CREATININE 0.52 (L) 12/08/2023 08:54 AM    CREATININE 0.59 (L) 10/13/2014 12:27 PM    GLUC 111 12/08/2023 08:54 AM    GLUF 115 (H) 09/13/2022 08:08 AM    GLUCOSE 103 10/13/2014 12:27 PM    CALCIUM 10.0 12/08/2023 08:54 AM    CALCIUM 9.8 10/13/2014 12:27 PM    CORRECTEDCA 10.0 01/16/2023 05:53 AM    MG 2.0 01/16/2023 05:53 AM    PHOS 5.0 (H) 01/16/2023 05:53 AM    AST 56 (H)  01/16/2023 05:53 AM    AST 21 10/13/2014 12:27 PM     (H) 01/16/2023 05:53 AM    ALT 29 10/13/2014 12:27 PM    ALKPHOS 416 (H) 01/16/2023 05:53 AM    ALKPHOS 130 10/13/2014 12:27 PM    PROT 8.0 10/13/2014 12:27 PM    BILITOT 0.97 10/13/2014 12:27 PM    EGFR 105 12/08/2023 08:54 AM     Health Maintenance:   Results from Last 12 Months   Lab Units 05/29/24  1040   HEP C AB  Non Reactive     No orders to display         Current Medications     Current Outpatient Medications:   •  hydroxychloroquine (PLAQUENIL) 200 mg tablet, Take 1 tablet (200 mg total) by mouth daily with breakfast, Disp: 90 tablet, Rfl: 3  •  levothyroxine 25 mcg tablet, TAKE 1 AND 1/2 TABLET (37.5 MCG TOTAL) BY MOUTH DAILY IN THE EARLY MORNING, Disp: 30 tablet, Rfl: 5    Health Maintenance     Health Maintenance   Topic Date Due   • Zoster Vaccine (1 of 2) Never done   • Breast Cancer Screening: Mammogram  10/20/2022   • COVID-19 Vaccine (5 - 2023-24 season) 09/01/2023   • Influenza Vaccine (1) 09/01/2024   • Cervical Cancer Screening  10/19/2024   • RSV Vaccine Age 60+ Years (1 - 1-dose 60+ series) 12/24/2024   • Colorectal Cancer Screening  12/28/2024   • Depression Screening  05/13/2025   • Annual Physical  05/13/2025   • BMI: Followup Plan  06/10/2025   • BMI: Adult  07/31/2025   • DTaP,Tdap,and Td Vaccines (4 - Td or Tdap) 11/11/2032   • HIV Screening  Completed   • Hepatitis C Screening  Completed   • RSV Vaccine age 0-20 Months  Aged Out   • Pneumococcal Vaccine: Pediatrics (0 to 5 Years) and At-Risk Patients (6 to 64 Years)  Aged Out   • HIB Vaccine  Aged Out   • IPV Vaccine  Aged Out   • Hepatitis A Vaccine  Aged Out   • Meningococcal ACWY Vaccine  Aged Out   • HPV Vaccine  Aged Out     Immunization History   Administered Date(s) Administered   • COVID-19 PFIZER VACCINE 0.3 ML IM 04/10/2021, 05/01/2021, 12/04/2021, 07/13/2022   • Influenza, injectable, quadrivalent, preservative free 0.5 mL 12/08/2023   • Influenza, recombinant,  quadrivalent,injectable, preservative free 10/26/2021   • Influenza, seasonal, injectable 10/15/2012, 11/18/2013, 10/06/2014, 10/18/2016   • Pneumococcal Conjugate Vaccine 20-valent (Pcv20), Polysace 06/21/2023   • Tdap 10/15/2012, 11/11/2022         ----------------------------------------------------  Huong Wilkinson DO, PGY-3  Internal Medicine Residency   [facility] [place] [place]

## 2024-08-01 PROBLEM — E03.9 HYPOTHYROIDISM: Status: ACTIVE | Noted: 2022-04-22

## 2024-08-14 PROBLEM — G93.0 ARACHNOID CYST: Status: ACTIVE | Noted: 2024-08-14

## 2024-08-14 NOTE — ASSESSMENT & PLAN NOTE
As addressed in HPI  Reports today headaches have significantly improved.  She reports occasional episodes of dizziness in the am   Nonfocal neurologic examination    Imagining    6/20/2024 MRI brain w/wo Left cerebral hemispheric arachnoid cysts. Small left temporal arachnoid cyst with mild underlying mass effect. Probable additional arachnoid cyst at the left vertex.       Plan  Explained the natural nature of an arachnoid cyst  Explained normally no neurosurgical intervention indicated  Explained we will follow-up with repeat MRI brain with and without contrast imaging in 3 months given that she is having symptoms of dizziness and headache.  Will likely not order additional imaging after this follow-up  Ordered - MRI brain w/out contrast due 9/20/2024, request checkout schedule follow-up appointment solo with me 3 days to 1 week after completion.    Advised patient if she has additional questions or concerns to notify the neurosurgery department.

## 2024-08-15 ENCOUNTER — CONSULT (OUTPATIENT)
Dept: NEUROSURGERY | Facility: CLINIC | Age: 59
End: 2024-08-15

## 2024-08-15 VITALS
HEART RATE: 72 BPM | WEIGHT: 133.6 LBS | BODY MASS INDEX: 26.93 KG/M2 | DIASTOLIC BLOOD PRESSURE: 68 MMHG | SYSTOLIC BLOOD PRESSURE: 116 MMHG | RESPIRATION RATE: 17 BRPM | HEIGHT: 59 IN | TEMPERATURE: 98.2 F | OXYGEN SATURATION: 96 %

## 2024-08-15 DIAGNOSIS — G93.0 ARACHNOID CYST: Primary | ICD-10-CM

## 2024-08-15 PROCEDURE — 99242 OFF/OP CONSLTJ NEW/EST SF 20: CPT | Performed by: NURSE PRACTITIONER

## 2024-08-15 NOTE — PROGRESS NOTES
Assessment/Plan:    Arachnoid cyst  As addressed in HPI  Reports today headaches have significantly improved.  She reports occasional episodes of dizziness in the am   Nonfocal neurologic examination    Imagining    6/20/2024 MRI brain w/wo Left cerebral hemispheric arachnoid cysts. Small left temporal arachnoid cyst with mild underlying mass effect. Probable additional arachnoid cyst at the left vertex.       Plan  Explained the natural nature of an arachnoid cyst  Explained normally no neurosurgical intervention indicated  Explained we will follow-up with repeat MRI brain with and without contrast imaging in 3 months given that she is having symptoms of dizziness and headache.  Will likely not order additional imaging after this follow-up  Ordered - MRI brain w/out contrast due 9/20/2024, request checkout schedule follow-up appointment solo with me 3 days to 1 week after completion.    Advised patient if she has additional questions or concerns to notify the neurosurgery department.        Arachnoid cyst  -     Ambulatory Referral to Neurosurgery         Patient ID: Alexandria Morrison is a 58 y.o. female   --[id number]      [facility]   She is referred to neurosurgery by her PCP after the incidental finding of arachnoid cyst during MRI brain with and without order imagining w/u ordered for assessment to determine etiology of daily left sided  headaches  wo aura.  She presents today for an initial neurosurgery visit    Imagining    6/20/2024 MRI brain w/wo Left cerebral hemispheric arachnoid cysts. Small left temporal arachnoid cyst with mild underlying mass effect. Probable additional arachnoid cyst at the left vertex.     REVIEW OF SYSTEMS  Review of Systems   Constitutional: Negative.    HENT: Negative.     Eyes: Negative.    Respiratory: Negative.     Cardiovascular: Negative.    Gastrointestinal: Negative.    Endocrine: Negative.    Genitourinary: Negative.    Musculoskeletal:  Negative for gait  "problem.   Neurological:  Positive for dizziness (occ), numbness (at times finger tips all finger more on right hand) and headaches (daily severity changes). Negative for tremors, seizures, speech difficulty and weakness (left shoulder with pain at times).   Hematological: Negative.    Psychiatric/Behavioral:  Positive for confusion (occ stm loss).          Meds/Allergies     Current Outpatient Medications   Medication Sig Dispense Refill    hydroxychloroquine (PLAQUENIL) 200 mg tablet Take 1 tablet (200 mg total) by mouth daily with breakfast 90 tablet 3    levothyroxine 25 mcg tablet TAKE 1 AND 1/2 TABLET (37.5 MCG TOTAL) BY MOUTH DAILY IN THE EARLY MORNING 30 tablet 5     No current facility-administered medications for this visit.       No Known Allergies    PAST HISTORY    Past Medical History:   Diagnosis Date    Hypothyroidism     Periodontal disease 2022       Past Surgical History:   Procedure Laterality Date     SECTION      CHOLECYSTECTOMY LAPAROSCOPIC N/A 2023    Procedure: CHOLECYSTECTOMY LAPAROSCOPIC;  Surgeon: Lauryn Ullrich, DO;  Location: [facility];  Service: General    TUBAL LIGATION         Social History     Tobacco Use    Smoking status: Never    Smokeless tobacco: Never   Vaping Use    Vaping status: Never Used   Substance Use Topics    Alcohol use: Not Currently     Comment: social    Drug use: Never       Family History   Problem Relation Age of Onset    Hypertension Mother     Diabetes Father     Heart attack Neg Hx     Cancer Neg Hx     Stroke Neg Hx        The following portions of the patient's history were reviewed and updated as appropriate: allergies, current medications, past family history, past medical history, past social history, past surgical history and problem list.      EXAM    Vitals:Blood pressure 116/68, pulse 72, temperature 98.2 °F (36.8 °C), temperature source Temporal, resp. rate 17, height 4' 11\" (1.499 m), weight 60.6 kg (133 lb 9.6 oz), SpO2 " 96%.,Body mass index is 26.98 kg/m².     Physical Exam  Vitals and nursing note reviewed.   Constitutional:       General: She is not in acute distress.     Appearance: She is not ill-appearing.   HENT:      Head: Normocephalic and atraumatic.   Pulmonary:      Effort: Pulmonary effort is normal. No respiratory distress.   Musculoskeletal:         General: Normal range of motion.      Cervical back: Normal range of motion.      Right lower leg: No edema.      Left lower leg: No edema.   Neurological:      General: No focal deficit present.      Mental Status: She is alert and oriented to person, place, and time.      Motor: Motor strength is normal.     Gait: Gait is intact.   Psychiatric:         Mood and Affect: Mood normal.         Behavior: Behavior normal.         Neurologic Exam     Mental Status   Oriented to person, place, and time.   Level of consciousness: alert    Motor Exam     Strength   Strength 5/5 throughout.     Sensory Exam   Light touch normal.     Gait, Coordination, and Reflexes     Gait  Gait: normal      Imaging Studies  No results found.    I have personally reviewed pertinent reports.   and I have personally reviewed pertinent films in PACS    I have spent a total time of 30 minutes on 08/15/24 in caring for this patient including Diagnostic results, Risks and benefits of tx options, Instructions for management, Patient and family education, Importance of tx compliance, Risk factor reductions, Impressions, Counseling / Coordination of care, Documenting in the medical record, Reviewing / ordering tests, medicine, procedures  , Obtaining or reviewing history  , and Communicating with other healthcare professionals .     PLEASE NOTE:  This encounter may have been completed utilizing the [employer] Direct Speech Voice Recognition Software. Grammatical errors, random word insertions, pronoun errors and incomplete sentences are occasional consequences of the system due to software  limitations, ambient noise and hardware issues.These may be missed even after proof reading prior to affixing electronic signature. Please do not hesitate to contact me directly if you have any questions or concerns about the content, text or information contained within the body of this dictation.

## 2024-09-09 ENCOUNTER — TELEPHONE (OUTPATIENT)
Age: 59
End: 2024-09-09

## 2024-09-09 NOTE — TELEPHONE ENCOUNTER
9/9/24 CALLED AND SPOKE TO PT, SHE STATES SHE NEEDED TO CANCEL MRI BRAIN APPT AND F/U. SHE STATES SHE WILL CALL BACK TO RESCHEDULE IN A FEW DAYS.

## 2025-01-02 DIAGNOSIS — E03.9 HYPOTHYROIDISM, UNSPECIFIED TYPE: ICD-10-CM

## 2025-01-02 RX ORDER — LEVOTHYROXINE SODIUM 25 UG/1
TABLET ORAL
Qty: 60 TABLET | Refills: 4 | Status: SHIPPED | OUTPATIENT
Start: 2025-01-02

## 2025-05-12 ENCOUNTER — OFFICE VISIT (OUTPATIENT)
Dept: INTERNAL MEDICINE CLINIC | Facility: CLINIC | Age: 60
End: 2025-05-12

## 2025-05-12 VITALS
OXYGEN SATURATION: 97 % | BODY MASS INDEX: 26.62 KG/M2 | HEART RATE: 66 BPM | HEIGHT: 60 IN | TEMPERATURE: 98.2 F | SYSTOLIC BLOOD PRESSURE: 103 MMHG | DIASTOLIC BLOOD PRESSURE: 67 MMHG | WEIGHT: 135.6 LBS

## 2025-05-12 DIAGNOSIS — Z23 ENCOUNTER FOR IMMUNIZATION: ICD-10-CM

## 2025-05-12 DIAGNOSIS — Z12.4 CERVICAL CANCER SCREENING: ICD-10-CM

## 2025-05-12 DIAGNOSIS — E03.9 HYPOTHYROIDISM, UNSPECIFIED TYPE: ICD-10-CM

## 2025-05-12 DIAGNOSIS — Z12.11 COLON CANCER SCREENING: ICD-10-CM

## 2025-05-12 DIAGNOSIS — R73.03 PREDIABETES: ICD-10-CM

## 2025-05-12 DIAGNOSIS — Z12.31 ENCOUNTER FOR SCREENING MAMMOGRAM FOR BREAST CANCER: ICD-10-CM

## 2025-05-12 DIAGNOSIS — G93.0 ARACHNOID CYST: ICD-10-CM

## 2025-05-12 DIAGNOSIS — M06.09 RHEUMATOID ARTHRITIS OF MULTIPLE SITES WITH NEGATIVE RHEUMATOID FACTOR (HCC): ICD-10-CM

## 2025-05-12 DIAGNOSIS — Z00.00 ANNUAL PHYSICAL EXAM: Primary | ICD-10-CM

## 2025-05-12 PROBLEM — R51.9 HEADACHE: Status: RESOLVED | Noted: 2024-07-31 | Resolved: 2025-05-12

## 2025-05-12 LAB — SL AMB POCT HEMOGLOBIN AIC: 5.9 (ref ?–6.5)

## 2025-05-12 PROCEDURE — 90750 HZV VACC RECOMBINANT IM: CPT

## 2025-05-12 PROCEDURE — 83036 HEMOGLOBIN GLYCOSYLATED A1C: CPT

## 2025-05-12 PROCEDURE — 99396 PREV VISIT EST AGE 40-64: CPT

## 2025-05-12 PROCEDURE — 90471 IMMUNIZATION ADMIN: CPT

## 2025-05-12 NOTE — ASSESSMENT & PLAN NOTE
Previously seen in office for new L sided headache, mild to moderate in intensity and otherwise uncharacteristic of migraine. Non-focal on exam.   MRI obtained 6/20/2024 revealed small left temporal arachnoid cyst with mild underlying mass effect. Probable additional arachnoid cyst at the left vertex   Patient reports improvement in HA intensity and frequency, has been taking OTC NSAIDs / Tylenol intermittently  Follows with neurosurgery, no surgical interventions recommended, repeat MRI ordered for monitoring  No focal neurodeficits on exam today, CN II-XII intact, strength 5/5 in all extremities, sensation to light touch grossly intac  Plan:  - Continue conservative management including OTC analgesics   - Follow up MRI brain as ordered by neurosurgery  - Follow up with neurosurgery as planned  -Return precautions provided .

## 2025-05-12 NOTE — ASSESSMENT & PLAN NOTE
Patient has a history of joint pain with arthralgias of the MCPs, PIPs with rheumatoid factor negative but anti-CCP positive.  X-ray of the hands was negative.    Saw rheumatologist who feels this is RA and she has been on therapy with Plaquenil  Plan:   Continue hydroxychloroquine as prescribed by rheumatology  Follow-up with rheumatology.  Ophthalmology referral provided for routine ophthalmologic evaluation in setting of chronic hydroxychloroquine use  Orders:    Ambulatory Referral to Ophthalmology; Future

## 2025-05-12 NOTE — PATIENT INSTRUCTIONS
"Patient Education     Routine physical for adults   The Basics   Written by the doctors and editors at [facility]   What is a physical? -- A physical is a routine visit, or \"check-up,\" with your doctor. You might also hear it called a \"wellness visit\" or \"preventive visit.\"  During each visit, the doctor will:   Ask about your physical and mental health   Ask about your habits, behaviors, and lifestyle   Do an exam   Give you vaccines if needed   Talk to you about any medicines you take   Give advice about your health   Answer your questions  Getting regular check-ups is an important part of taking care of your health. It can help your doctor find and treat any problems you have. But it's also important for preventing health problems.  A routine physical is different from a \"sick visit.\" A sick visit is when you see a doctor because of a health concern or problem. Since physicals are scheduled ahead of time, you can think about what you want to ask the doctor.  How often should I get a physical? -- It depends on your age and health. In general, for people age 21 years and older:   If you are younger than 50 years, you might be able to get a physical every 3 years.   If you are 50 years or older, your doctor might recommend a physical every year.  If you have an ongoing health condition, like diabetes or high blood pressure, your doctor will probably want to see you more often.  What happens during a physical? -- In general, each visit will include:   Physical exam - The doctor or nurse will check your height, weight, heart rate, and blood pressure. They will also look at your eyes and ears. They will ask about how you are feeling and whether you have any symptoms that bother you.   Medicines - It's a good idea to bring a list of all the medicines you take to each doctor visit. Your doctor will talk to you about your medicines and answer any questions. Tell them if you are having any side effects that bother you. You " "should also tell them if you are having trouble paying for any of your medicines.   Habits and behaviors - This includes:   Your diet   Your exercise habits   Whether you smoke, drink alcohol, or use drugs   Whether you are sexually active   Whether you feel safe at home  Your doctor will talk to you about things you can do to improve your health and lower your risk of health problems. They will also offer help and support. For example, if you want to quit smoking, they can give you advice and might prescribe medicines. If you want to improve your diet or get more physical activity, they can help you with this, too.   Lab tests, if needed - The tests you get will depend on your age and situation. For example, your doctor might want to check your:   Cholesterol   Blood sugar   Iron level   Vaccines - The recommended vaccines will depend on your age, health, and what vaccines you already had. Vaccines are very important because they can prevent certain serious or deadly infections.   Discussion of screening - \"Screening\" means checking for diseases or other health problems before they cause symptoms. Your doctor can recommend screening based on your age, risk, and preferences. This might include tests to check for:   Cancer, such as breast, prostate, cervical, ovarian, colorectal, prostate, lung, or skin cancer   Sexually transmitted infections, such as chlamydia and gonorrhea   Mental health conditions like depression and anxiety  Your doctor will talk to you about the different types of screening tests. They can help you decide which screenings to have. They can also explain what the results might mean.   Answering questions - The physical is a good time to ask the doctor or nurse questions about your health. If needed, they can refer you to other doctors or specialists, too.  Adults older than 65 years often need other care, too. As you get older, your doctor will talk to you about:   How to prevent falling at " home   Hearing or vision tests   Memory testing   How to take your medicines safely   Making sure that you have the help and support you need at home  All topics are updated as new evidence becomes available and our peer review process is complete.  This topic retrieved from [employer] on: May 02, 2024.  Topic 900914 Version 1.0  Release: 32.4.3 - C32.122  © 2024 UpToDate, Inc. and/or its affiliates. All rights reserved.  Consumer Information Use and Disclaimer   Disclaimer: This generalized information is a limited summary of diagnosis, treatment, and/or medication information. It is not meant to be comprehensive and should be used as a tool to help the user understand and/or assess potential diagnostic and treatment options. It does NOT include all information about conditions, treatments, medications, side effects, or risks that may apply to a specific patient. It is not intended to be medical advice or a substitute for the medical advice, diagnosis, or treatment of a health care provider based on the health care provider's examination and assessment of a patient's specific and unique circumstances. Patients must speak with a health care provider for complete information about their health, medical questions, and treatment options, including any risks or benefits regarding use of medications. This information does not endorse any treatments or medications as safe, effective, or approved for treating a specific patient. UpToDate, Inc. and its affiliates disclaim any warranty or liability relating to this information or the use thereof.The use of this information is governed by the Terms of Use, available at https://www.[employer].com/en/know/clinical-effectiveness-terms. 2024© UpToDate, Inc. and its affiliates and/or licensors. All rights reserved.  Copyright   © 2024 UpToDate, Inc. and/or its affiliates. All rights reserved.

## 2025-05-12 NOTE — ASSESSMENT & PLAN NOTE
Managed on levothyroxine 25 mcg  Symptoms well-controlled  Plan: routine TSH to guide further management.  Orders:    TSH, 3rd generation; Future

## 2025-05-12 NOTE — PROGRESS NOTES
Adult Annual Physical  Name: Alexandria Morrison      : 1965      MRN: [id number]  Encounter Provider: Huong Wilkinson DO  Encounter Date: 2025   Encounter department: [facility] [place]    :  Assessment & Plan  Annual physical exam         Hypothyroidism, unspecified type  Managed on levothyroxine 25 mcg  Symptoms well-controlled  Plan: routine TSH to guide further management.  Orders:    TSH, 3rd generation; Future    Arachnoid cyst  Previously seen in office for new L sided headache, mild to moderate in intensity and otherwise uncharacteristic of migraine. Non-focal on exam.   MRI obtained 2024 revealed small left temporal arachnoid cyst with mild underlying mass effect. Probable additional arachnoid cyst at the left vertex   Patient reports improvement in HA intensity and frequency, has been taking OTC NSAIDs / Tylenol intermittently  Follows with neurosurgery, no surgical interventions recommended, repeat MRI ordered for monitoring  No focal neurodeficits on exam today, CN II-XII intact, strength 5/5 in all extremities, sensation to light touch grossly intac  Plan:  - Continue conservative management including OTC analgesics   - Follow up MRI brain as ordered by neurosurgery  - Follow up with neurosurgery as planned  -Return precautions provided .     Prediabetes  A1c previously 6.1  Compliant with lifestyle and dietary measures  Limited workup given patient is self-pay    POCT A1c 5.9, stable from prior  Plan:  Continue healthy lifestyle and dietary measures as noted below.  Orders:    POCT hemoglobin A1c    Lipid panel; Future    Encounter for screening mammogram for breast cancer    Orders:    Mammo screening bilateral w 3d and cad; Future    Colon cancer screening    Orders:    Ambulatory referral to Gastroenterology; Future    Rheumatoid arthritis of multiple sites with negative rheumatoid factor (HCC)  Patient has a history of joint pain with arthralgias of  the MCPs, PIPs with rheumatoid factor negative but anti-CCP positive.  X-ray of the hands was negative.    Saw rheumatologist who feels this is RA and she has been on therapy with Plaquenil  Plan:   Continue hydroxychloroquine as prescribed by rheumatology  Follow-up with rheumatology.  Ophthalmology referral provided for routine ophthalmologic evaluation in setting of chronic hydroxychloroquine use  Orders:    Ambulatory Referral to Ophthalmology; Future    Cervical cancer screening    Orders:    Ambulatory Referral to Obstetrics / Gynecology; Future    Encounter for immunization    Orders:    Zoster Vaccine Recombinant IM                   Preventive Screenings:  - Diabetes Screening: orders placed  - Cholesterol Screening: orders placed   - Hepatitis C screening: screening up-to-date   - HIV screening: screening up-to-date   - Cervical cancer screening: orders placed   - Colon cancer screening: orders placed   - Lung cancer screening: screening not indicated     Immunizations:  - Immunizations due: Zoster (Shingrix)         History of Present Illness     Adult Annual Physical:  Patient presents for annual physical. Patient is a 59-year-old female with history of prediabetes, arachnoid cyst, hypothyroidism who presents for annual physical exam.  Without any concerns today.  Previously seen in office for left-sided headaches currently resolved.  Seen by neurosurgery in setting of arachnoid cyst, no surgical intervention is recommended.  Repeat MRI ordered by neurosurgery.  Compliant with all medications at home.  Mammogram screening not up-to-date.  Zoster vaccine not up-to-date.  Cervical cancer screen not up-to-date..     Diet and Physical Activity:  - Diet/Nutrition: no special diet.  - Exercise: no formal exercise.    Depression Screening:  - PHQ-2 Score: 0    General Health:  - Sleep: sleeps well.  - Hearing: normal hearing bilateral ears.  - Vision: no vision problems.  - Dental: regular dental  visits.    /GYN Health:  - Follows with GYN: yes.   - Menopause: postmenopausal.   - History of STDs: no    Review of Systems  All ROS negative unless otherwise specified in HPI.    Objective   /67 (BP Location: Left arm, Patient Position: Sitting, Cuff Size: Adult)   Pulse 66   Temp 98.2 °F (36.8 °C) (Temporal)   Ht 5' (1.524 m)   Wt 61.5 kg (135 lb 9.6 oz)   SpO2 97%   BMI 26.48 kg/m²     Physical Exam  Vitals and nursing note reviewed.   Constitutional:       General: She is not in acute distress.     Appearance: She is well-developed.   HENT:      Head: Normocephalic and atraumatic.   Eyes:      Conjunctiva/sclera: Conjunctivae normal.   Cardiovascular:      Rate and Rhythm: Normal rate and regular rhythm.      Heart sounds: No murmur heard.  Pulmonary:      Effort: Pulmonary effort is normal. No respiratory distress.      Breath sounds: Normal breath sounds.   Abdominal:      Palpations: Abdomen is soft.      Tenderness: There is no abdominal tenderness.   Musculoskeletal:         General: No swelling.      Cervical back: Neck supple.   Skin:     General: Skin is warm and dry.      Capillary Refill: Capillary refill takes less than 2 seconds.   Neurological:      Mental Status: She is alert.   Psychiatric:         Mood and Affect: Mood normal.

## 2025-05-19 ENCOUNTER — PREP FOR PROCEDURE (OUTPATIENT)
Age: 60
End: 2025-05-19

## 2025-05-19 ENCOUNTER — TELEPHONE (OUTPATIENT)
Age: 60
End: 2025-05-19

## 2025-05-19 DIAGNOSIS — Z12.11 ENCOUNTER FOR SCREENING COLONOSCOPY: Primary | ICD-10-CM

## 2025-05-19 NOTE — TELEPHONE ENCOUNTER
Scheduled date of colonoscopy (as of today): 7/9/2025  Physician performing colonoscopy:   Location of colonoscopy: [facility]  Bowel prep reviewed with patient: Joan Macario  Instructions reviewed with patient by: Joan macario  Clearances: N/A        Oleksandr Dul prep sent via [employer]

## 2025-05-19 NOTE — LETTER
Alexandria Elizabeth Sernaque  [address] [address]                    Instrucciones de medicamentos para adultos con diabetes   que deben someterse a norah preparación intestinal.   Medicine Instructions for Adults with Diabetes who Need a Bowel Prep    Siga estas instrucciones cuando se requiera norah PREPARACIÓN INTESTINAL para whitehead procedimiento médico o cirugía.    NOTA:   Agonistas del receptor del GLP-1 que se nadia de forma semanal: no los tome por 7 días antes del procedimiento médico.   Inhibidores del SGLT-2: no los tome por 4 días antes del procedimiento médico.     El día previo a la cirugía o al procedimiento médico  Si va a someterse a un procedimiento médico (p. ej., norah colonoscopia) o norah cirugía que requiera norah preparación intestinal y puede adoptar norah dieta de líquidos ysabel, siga las instrucciones que se indican a continuación según el tipo de medicamento que alex para la diabetes.     Tipo de medicamento que alex Ejemplos Qué hacer   Insulina premezclada de acción intermedia Humalog® 75/25, Humulin® 70/30, NovoLog® 70/30, insulina normal. [place] la mitad de whitehead dosis normal la noche previa al procedimiento médico.   Insulina de acción rápida Humalog® U200, NovoLog®, Apidra®, Fiasp®. [place] la mitad de whitehead dosis normal la noche previa al procedimiento médico.   Insulina de acción prolongada Lantus®, Levemir®, Tresiba®, Toujeo®, Basaglar®. [place] whitehead dosis normal COMPLETA el día previo al procedimiento médico.   Sulfonilurea por vía oral Glipizida/Glimepirida/Glucotrol®. [place] la mitad de whitehead dosis normal la noche previa al procedimiento médico.   Otros medicamentos orales para la diabetes Metformin®, Glucophage®, Glucophage XR®, Riomet®, Glumetza®, Actose®, Avandia®, Glyset®, Prandin®. [place] whitehead dosis normal con la elizabeth la noche previa al procedimiento médico.   Agonistas del receptor del GLP-1 AdlyxinÒ, ByettaÒ, BydureonÒ, OzempicÒ, SoliquaÒ, TanzeumÒ, TrulicityÒ, VictozaÒ, Saxenda®, Rybelsus®,  Wegovy® Si lo alex a diario, tómelo keith normalmente.  Si lo alex de forma semanal, no tome wyatt medicamento ashley 7 días antes del procedimiento, incluyendo norah mismo día (reanude la alex después del procedimiento).   Inhibidores del SGLT-2 Jardiance®, Invokana®, Farxiga®,   Steglatro®, Brenzavvy®, Qtern®, Segluromet®, Glyxambi®, Synjardy®, Synjardy XR®, Invokamet®, Invokamet XR®, Trijary XR®, Xigduo XR®, Steglujan®. No los tome ashley 4 días antes del procedimiento médico, incluyendo norah mismo día (reanude la alex después del procedimiento).   En la parte de atrás, puede encontrar más información sobre “El día de la cirugía o el procedimiento médico”      Instrucciones de medicamentos para adultos con diabetes  que deben someterse a norah preparación intestinal.     Página 2      El día de la cirugía o el procedimiento médico  Siga las indicaciones que se encuentran a continuación según el tipo de medicamento que alex para tratar la diabetes.     Tipo de medicamento que alex Ejemplos Qué hacer   Insulina de acción prolongada Lantus®, Levemir®, Tresiba®, Toujeo®, Basaglar®, Semglee®.   Si normalmente alex insulina de acción prolongada a la mañana, tome la dosis completa según lo planificado.   Agonistas del receptor del GLP-1 AdlyxinÒ, ByettaÒ, BydureonÒ, OzempicÒ, SoliquaÒ, TanzeumÒ, TrulicityÒ, VictozaÒ, Saxenda®, Rybelsus®. NO tome wyatt medicamento el día del procedimiento médico (reanude la alex después).     A excepción de la alex matutina de la insulina de acción prolongada, NO tome NINGÚN medicamento para la diabetes el día del procedimiento médico, a menos que se lo haya indicado el médico que los administra.    Continúe controlando jarett niveles de azúcar en alfredo.  Si tiene norah bomba de insulina, hable con whitehead endocrinólogo al menos 3 días antes del procedimiento médico y solicítele instrucciones. NOTA: Si no puede hablar con whitehead endocrinólogo con antelación, el día de la intervención ajuste whitehead bomba de  insulina solo a whitehead ritmo basal. Traiga los suministros de la bomba de insulina al hospital.     Si tiene alguna arie sobre cómo nereyda jarett medicamentos para la diabetes antes del procedimiento médico, comuníquese con el médico que los administra.

## 2025-05-19 NOTE — TELEPHONE ENCOUNTER
05/19/25  Screened by: Louise Nicole     Referring Provider KALLIE MOY          Pre- Screening:      Body mass index is 26.48 kg/m².  Has patient been referred for a routine screening Colonoscopy? yes  Is the patient between 45-75 years old? yes        Previous Colonoscopy no   If yes:    Date:     Facility:     Reason:         SCHEDULING STAFF: If the patient is between 45yrs-49yrs, please advise patient to confirm benefits/coverage with their insurance company for a routine screening colonoscopy, some insurance carriers will only cover at 50yrs or older. If the patient is over 75years old, please schedule an office visit.      Does the patient want to see a Gastroenterologist prior to their procedure OR are they having any GI symptoms? no     Has the patient been hospitalized or had abdominal surgery in the past 6 months? no     Does the patient use supplemental oxygen? no     Does the patient take Coumadin, Lovenox, Plavix, Elliquis, Xarelto, or other blood thinning medication? no     Has the patient had a stroke, cardiac event, or stent placed in the past year? no     SCHEDULING STAFF: If patient answers NO to above questions, then schedule procedure. If patient answers YES to above questions, then schedule office appointment.      If patient is between 45yrs - 49yrs, please advise patient that we will have to confirm benefits & coverage with their insurance company for a routine screening colonoscopy.

## 2025-05-23 DIAGNOSIS — M06.041 RHEUMATOID ARTHRITIS INVOLVING BOTH HANDS WITH NEGATIVE RHEUMATOID FACTOR (HCC): ICD-10-CM

## 2025-05-23 DIAGNOSIS — M06.042 RHEUMATOID ARTHRITIS INVOLVING BOTH HANDS WITH NEGATIVE RHEUMATOID FACTOR (HCC): ICD-10-CM

## 2025-05-28 RX ORDER — HYDROXYCHLOROQUINE SULFATE 200 MG/1
200 TABLET, FILM COATED ORAL
Qty: 90 TABLET | Refills: 3 | Status: SHIPPED | OUTPATIENT
Start: 2025-05-28 | End: 2026-05-23

## 2025-07-07 ENCOUNTER — ANNUAL EXAM (OUTPATIENT)
Dept: OBGYN CLINIC | Facility: CLINIC | Age: 60
End: 2025-07-07

## 2025-07-07 VITALS
BODY MASS INDEX: 27.21 KG/M2 | HEART RATE: 65 BPM | SYSTOLIC BLOOD PRESSURE: 117 MMHG | DIASTOLIC BLOOD PRESSURE: 74 MMHG | HEIGHT: 60 IN | WEIGHT: 138.6 LBS

## 2025-07-07 DIAGNOSIS — Z01.419 WOMEN'S ANNUAL ROUTINE GYNECOLOGICAL EXAMINATION: Primary | ICD-10-CM

## 2025-07-07 DIAGNOSIS — Z12.4 CERVICAL CANCER SCREENING: ICD-10-CM

## 2025-07-07 DIAGNOSIS — Z12.39 ENCOUNTER FOR BREAST CANCER SCREENING USING NON-MAMMOGRAM MODALITY: ICD-10-CM

## 2025-07-07 PROCEDURE — 99396 PREV VISIT EST AGE 40-64: CPT | Performed by: NURSE PRACTITIONER

## 2025-07-07 NOTE — PROGRESS NOTES
ANNUAL GYNECOLOGICAL EXAMINATION    Alexandria Morrison is a 59 y.o. female who presents today for annual GYN exam.  Her last pap smear was performed  and result was NILM, HPV negative.  She reports no history of abnormal pap smears in her past.  Her last mammogram was performed  and result was BIRADS 1.  She has not had a colon cancer screening performed.  She had HIV screening performed  and it was negative.  She reports menopause around the age of 49 with no PMB.  No LMP recorded. Patient is postmenopausal.  Her general medical history has been reviewed and she reports it as follows:    Past Medical History[1]  Past Surgical History[2]  OB History          2    Para   1    Term   1            AB   1    Living   1         SAB        IAB        Ectopic        Multiple        Live Births                   Social History[3]  Social History     Substance and Sexual Activity   Sexual Activity Not Currently    Partners: Male    Birth control/protection: Female Sterilization     Cancer-related family history is negative for Cancer.    Current Outpatient Medications   Medication Instructions    hydroxychloroquine (PLAQUENIL) 200 mg, Oral, Daily with breakfast    levothyroxine 25 mcg tablet TAKE 1 AND 1/2 TABLET BY MOUTH DAILY IN THE EARLY MORNING       Review of Systems:  Review of Systems   Constitutional: Negative.    Gastrointestinal: Negative.    Genitourinary: Negative.    Skin: Negative.        Physical Exam:  /74 (BP Location: Right arm, Patient Position: Sitting)   Pulse 65   Ht 5' (1.524 m)   Wt 62.9 kg (138 lb 9.6 oz)   BMI 27.07 kg/m²   Physical Exam  Constitutional:       General: She is not in acute distress.     Appearance: Normal appearance.   Genitourinary:      Vulva and bladder normal.      No lesions in the vagina.      No vaginal erythema or ulceration.        Right Adnexa: not tender and no mass present.     Left Adnexa: not tender and no mass present.     No  cervical motion tenderness or lesion.      Uterus is not enlarged or tender.      No uterine mass detected.  Breasts:     Right: No mass, nipple discharge or skin change.      Left: No mass, nipple discharge or skin change.     Cardiovascular:      Rate and Rhythm: Normal rate and regular rhythm.   Pulmonary:      Effort: Pulmonary effort is normal.      Breath sounds: Normal breath sounds.   Abdominal:      General: Abdomen is flat.      Palpations: Abdomen is soft.     Musculoskeletal:      Cervical back: Neck supple.     Neurological:      Mental Status: She is alert.     Skin:     General: Skin is warm and dry.     Psychiatric:         Mood and Affect: Mood normal.         Behavior: Behavior normal.   Vitals reviewed.             Assessment/Plan:   1. Normal well-woman GYN exam.  2. Cervical cancer screening:  Normal cervical exam.  Pap smear due next year     3. STD screening:  Patient declines.   4. Breast cancer screening:  Normal breast exam. Scheduled in October for bilateral screening mammogram.  Reviewed breast self-awareness.   5. Colon cancer screening: Scheduled this month with Gastroenterology for consultation to discuss screening.   6. Depression Screening: Patient's depression screening was assessed with a PHQ-2 score of 0.  Clinically patient does not have depression. No treatment is required.     7. BMI Counseling: Body mass index is 27.07 kg/m². Discussed the patient's BMI with her. The BMI is above normal. Exercise recommendations include exercising 3-5 times per week.   8. Return to office in one year for annual exam or sooner if needed.    Reviewed with patient that test results are available in [facility] immediately, but that they will not necessarily be reviewed by me immediately.  Explained that I will review results at my earliest opportunity and contact patient appropriately.         [1]   Past Medical History:  Diagnosis Date    Hypothyroidism     Periodontal disease 02/07/2022   [2]    Past Surgical History:  Procedure Laterality Date     SECTION      CHOLECYSTECTOMY LAPAROSCOPIC N/A 2023    Procedure: CHOLECYSTECTOMY LAPAROSCOPIC;  Surgeon: Lauryn Ullrich, DO;  Location: [facility];  Service: General    TUBAL LIGATION     [3]   Social History  Tobacco Use    Smoking status: Never    Smokeless tobacco: Never   Vaping Use    Vaping status: Never Used   Substance Use Topics    Alcohol use: Not Currently     Comment: social    Drug use: Never

## 2025-07-09 ENCOUNTER — ANESTHESIA EVENT (OUTPATIENT)
Dept: GASTROENTEROLOGY | Facility: HOSPITAL | Age: 60
End: 2025-07-09
Payer: COMMERCIAL

## 2025-07-09 ENCOUNTER — HOSPITAL ENCOUNTER (OUTPATIENT)
Dept: GASTROENTEROLOGY | Facility: HOSPITAL | Age: 60
Setting detail: OUTPATIENT SURGERY
Discharge: HOME | End: 2025-07-09
Attending: STUDENT IN AN ORGANIZED HEALTH CARE EDUCATION/TRAINING PROGRAM
Payer: COMMERCIAL

## 2025-07-09 ENCOUNTER — ANESTHESIA (OUTPATIENT)
Dept: GASTROENTEROLOGY | Facility: HOSPITAL | Age: 60
End: 2025-07-09
Payer: COMMERCIAL

## 2025-07-09 VITALS
BODY MASS INDEX: 26.9 KG/M2 | HEART RATE: 67 BPM | RESPIRATION RATE: 17 BRPM | WEIGHT: 137 LBS | HEIGHT: 60 IN | TEMPERATURE: 96.2 F | SYSTOLIC BLOOD PRESSURE: 108 MMHG | OXYGEN SATURATION: 98 % | DIASTOLIC BLOOD PRESSURE: 67 MMHG

## 2025-07-09 DIAGNOSIS — Z12.11 ENCOUNTER FOR SCREENING COLONOSCOPY: ICD-10-CM

## 2025-07-09 PROCEDURE — 45385 COLONOSCOPY W/LESION REMOVAL: CPT | Performed by: INTERNAL MEDICINE

## 2025-07-09 PROCEDURE — 88305 TISSUE EXAM BY PATHOLOGIST: CPT | Performed by: PATHOLOGY

## 2025-07-09 RX ORDER — PROPOFOL 10 MG/ML
INJECTION, EMULSION INTRAVENOUS CONTINUOUS PRN
Status: DISCONTINUED | OUTPATIENT
Start: 2025-07-09 | End: 2025-07-09

## 2025-07-09 RX ORDER — LIDOCAINE HYDROCHLORIDE 10 MG/ML
INJECTION, SOLUTION EPIDURAL; INFILTRATION; INTRACAUDAL; PERINEURAL AS NEEDED
Status: DISCONTINUED | OUTPATIENT
Start: 2025-07-09 | End: 2025-07-09

## 2025-07-09 RX ORDER — SODIUM CHLORIDE 9 MG/ML
INJECTION, SOLUTION INTRAVENOUS CONTINUOUS PRN
Status: DISCONTINUED | OUTPATIENT
Start: 2025-07-09 | End: 2025-07-09

## 2025-07-09 RX ORDER — PROPOFOL 10 MG/ML
INJECTION, EMULSION INTRAVENOUS AS NEEDED
Status: DISCONTINUED | OUTPATIENT
Start: 2025-07-09 | End: 2025-07-09

## 2025-07-09 RX ADMIN — Medication 40 MG: at 09:48

## 2025-07-09 RX ADMIN — PROPOFOL 30 MG: 10 INJECTION, EMULSION INTRAVENOUS at 09:59

## 2025-07-09 RX ADMIN — PROPOFOL 150 MG: 10 INJECTION, EMULSION INTRAVENOUS at 09:35

## 2025-07-09 RX ADMIN — LIDOCAINE HYDROCHLORIDE 50 MG: 10 INJECTION, SOLUTION EPIDURAL; INFILTRATION; INTRACAUDAL; PERINEURAL at 09:35

## 2025-07-09 RX ADMIN — PROPOFOL 100 MCG/KG/MIN: 10 INJECTION, EMULSION INTRAVENOUS at 09:37

## 2025-07-09 RX ADMIN — SODIUM CHLORIDE: 0.9 INJECTION, SOLUTION INTRAVENOUS at 09:26

## 2025-07-09 RX ADMIN — SODIUM CHLORIDE: 0.9 INJECTION, SOLUTION INTRAVENOUS at 09:58

## 2025-07-09 NOTE — ANESTHESIA PREPROCEDURE EVALUATION
Procedure:  COLONOSCOPY    Relevant Problems   CARDIO   (+) Varicose veins of left lower extremity with pain      ENDO   (+) Hypothyroidism      MUSCULOSKELETAL   (+) Rheumatoid arthritis of multiple sites with negative rheumatoid factor (HCC)        NPO 2am prep 7/9    Physical Exam    Airway     Mallampati score: I  TM Distance: >3 FB  Neck ROM: full      Cardiovascular  Cardiovascular exam normal    Dental    edentulous    Pulmonary  Pulmonary exam normal     Neurological      Other Findings  post-pubertal.      Anesthesia Plan  ASA Score- 2     Anesthesia Type- IV sedation with anesthesia with ASA Monitors.         Additional Monitors:     Airway Plan:            Plan Factors-Exercise tolerance (METS): >4 METS.    Chart reviewed. EKG reviewed.  Existing labs reviewed. Patient summary reviewed.          Obstructive sleep apnea risk education given perioperatively.        Induction- intravenous.    Postoperative Plan- .   Monitoring Plan - Monitoring plan - standard ASA monitoring  Post Operative Pain Plan - non-opiod analgesics    Perioperative Resuscitation Plan - Level 1 - Full Code.       Informed Consent- Anesthetic plan and risks discussed with patient ([id number]).  I personally reviewed this patient with the CRNA. Discussed and agreed on the Anesthesia Plan with the CRNA..      NPO Status:  Vitals Value Taken Time   Date of last liquid 07/09/25 07/09/25 08:47   Time of last liquid 0200 07/09/25 08:47   Date of last solid 07/07/25 07/09/25 08:47   Time of last solid 1200 07/09/25 08:47

## 2025-07-09 NOTE — H&P
History and Physical - [facility]  Alexandria Morrison 59 y.o. female MRN: [id number]                  HPI: Alexandria Morrison is a 59 y.o. year old female who presents for colonoscopy for colorectal cancer screening.  Negative FIT test in .  Not currently on AP/AC.      REVIEW OF SYSTEMS: Per the HPI, and otherwise unremarkable.    Historical Information   Past Medical History[1]  Past Surgical History[2]  Social History   Social History     Substance and Sexual Activity   Alcohol Use Not Currently    Comment: social     Social History     Substance and Sexual Activity   Drug Use Never     Tobacco Use History[3]  Family History[4]    Meds/Allergies     Current Medications[5]    Allergies[6]    Objective     /68   Pulse 72   Temp (!) 97.3 °F (36.3 °C) (Tympanic)   Resp 17   Ht 5' (1.524 m)   Wt 62.1 kg (137 lb)   SpO2 98%   BMI 26.76 kg/m²       PHYSICAL EXAM    Gen: NAD  Head: NCAT  CV: RRR  CHEST: Clear  ABD: soft, NT/ND  EXT: no edema      ASSESSMENT/PLAN:  This is a 59 y.o. year old female here for colonoscopy, and she is stable and optimized for her procedure.             [1]   Past Medical History:  Diagnosis Date    Hypothyroidism     Periodontal disease 2022   [2]   Past Surgical History:  Procedure Laterality Date     SECTION      CHOLECYSTECTOMY LAPAROSCOPIC N/A 2023    Procedure: CHOLECYSTECTOMY LAPAROSCOPIC;  Surgeon: Lauryn Ullrich, DO;  Location: [facility];  Service: General    TUBAL LIGATION     [3]   Social History  Tobacco Use   Smoking Status Never   Smokeless Tobacco Never   [4]   Family History  Problem Relation Name Age of Onset    No Known Problems Mother      Diabetes Father      No Known Problems Sister      No Known Problems Brother      No Known Problems Brother      No Known Problems Maternal Grandmother      No Known Problems Maternal Grandfather      No Known Problems Paternal Grandmother      No Known Problems  Paternal Grandfather      Heart attack Neg Hx      Cancer Neg Hx      Stroke Neg Hx     [5]   Current Outpatient Medications:     hydroxychloroquine (PLAQUENIL) 200 mg tablet    levothyroxine 25 mcg tablet  [6] No Known Allergies

## 2025-07-09 NOTE — ANESTHESIA POSTPROCEDURE EVALUATION
Post-Op Assessment Note    CV Status:  Stable  Pain Score: 0    Pain management: adequate    Multimodal analgesia used between 6 hours prior to anesthesia start to PACU discharge    Mental Status:  Alert   Hydration Status:  Stable   PONV Controlled:  None   Airway Patency:  Patent  Two or more mitigation strategies used for obstructive sleep apnea   Post Op Vitals Reviewed: Yes    No anethesia notable event occurred.    Staff: CRNA           Last Filed PACU Vitals:  Vitals Value Taken Time   Temp 97,1    Pulse 70    /67    Resp 18    SpO2 97

## 2025-07-14 ENCOUNTER — RESULTS FOLLOW-UP (OUTPATIENT)
Dept: GASTROENTEROLOGY | Facility: CLINIC | Age: 60
End: 2025-07-14

## 2025-07-14 PROCEDURE — 88305 TISSUE EXAM BY PATHOLOGIST: CPT | Performed by: PATHOLOGY

## (undated) DEVICE — SUT MONOCRYL 4-0 PS-2 18 IN Y496G

## (undated) DEVICE — LIGAMAX 5 MM ENDOSCOPIC MULTIPLE CLIP APPLIER: Brand: LIGAMAX

## (undated) DEVICE — ENDOPATH PNEUMONEEDLE INSUFFLATION NEEDLES WITH LUER LOCK CONNECTORS 120MM: Brand: ENDOPATH

## (undated) DEVICE — INTENDED FOR TISSUE SEPARATION, AND OTHER PROCEDURES THAT REQUIRE A SHARP SURGICAL BLADE TO PUNCTURE OR CUT.: Brand: BARD-PARKER SAFETY BLADES SIZE 15, STERILE

## (undated) DEVICE — Device: Brand: OMNICLOSE TROCAR SITE CLOSURE DEVICE

## (undated) DEVICE — ELECTRODE LAP BLADE CRV E-Z CLEAN 33CM -0019

## (undated) DEVICE — ELECTRODE LAP J HOOK E-Z CLEAN 33CM-0021

## (undated) DEVICE — TROCAR: Brand: KII® SLEEVE

## (undated) DEVICE — SUT VICRYL PLUS 0 UR-6 27IN VCP603H

## (undated) DEVICE — PENCIL ELECTROSURG E-Z CLEAN -0035H

## (undated) DEVICE — TROCAR: Brand: KII FIOS FIRST ENTRY

## (undated) DEVICE — PACK PBDS LAP CHOLE RF

## (undated) DEVICE — ADHESIVE SKIN HIGH VISCOSITY EXOFIN 1ML

## (undated) DEVICE — 3000CC GUARDIAN II: Brand: GUARDIAN

## (undated) DEVICE — CHLORAPREP HI-LITE 26ML ORANGE

## (undated) DEVICE — GLOVE SRG BIOGEL 6

## (undated) DEVICE — NEEDLE 25G X 1 1/2

## (undated) DEVICE — GLOVE INDICATOR UNDERGLOVE SZ 6 BLUE